# Patient Record
Sex: FEMALE | Race: WHITE | Employment: OTHER | URBAN - METROPOLITAN AREA
[De-identification: names, ages, dates, MRNs, and addresses within clinical notes are randomized per-mention and may not be internally consistent; named-entity substitution may affect disease eponyms.]

---

## 2020-03-05 PROBLEM — N32.81 OAB (OVERACTIVE BLADDER): Status: ACTIVE | Noted: 2020-03-05

## 2020-03-05 PROBLEM — I10 ESSENTIAL HYPERTENSION: Status: ACTIVE | Noted: 2020-03-05

## 2020-03-05 PROBLEM — E66.09 CLASS 2 OBESITY DUE TO EXCESS CALORIES WITHOUT SERIOUS COMORBIDITY WITH BODY MASS INDEX (BMI) OF 39.0 TO 39.9 IN ADULT: Status: ACTIVE | Noted: 2020-03-05

## 2020-03-12 PROBLEM — M47.812 SPONDYLOSIS OF CERVICAL REGION WITHOUT MYELOPATHY OR RADICULOPATHY: Status: ACTIVE | Noted: 2020-03-12

## 2020-03-12 PROBLEM — H25.9 AGE-RELATED CATARACT OF BOTH EYES, UNSPECIFIED AGE-RELATED CATARACT TYPE: Status: ACTIVE | Noted: 2020-03-12

## 2020-03-12 PROBLEM — H35.52 RETINITIS PIGMENTOSA: Status: ACTIVE | Noted: 2020-03-12

## 2020-03-12 PROBLEM — K59.09 CHRONIC CONSTIPATION: Status: ACTIVE | Noted: 2020-03-12

## 2020-06-29 PROCEDURE — 88305 TISSUE EXAM BY PATHOLOGIST: CPT | Performed by: INTERNAL MEDICINE

## 2020-06-29 PROCEDURE — 88341 IMHCHEM/IMCYTCHM EA ADD ANTB: CPT | Performed by: INTERNAL MEDICINE

## 2020-06-29 PROCEDURE — 88342 IMHCHEM/IMCYTCHM 1ST ANTB: CPT | Performed by: INTERNAL MEDICINE

## 2020-07-07 PROBLEM — C20 RECTAL CANCER (HCC): Status: ACTIVE | Noted: 2020-07-07

## 2020-07-10 ENCOUNTER — LAB ENCOUNTER (OUTPATIENT)
Dept: LAB | Facility: HOSPITAL | Age: 75
End: 2020-07-10
Attending: INTERNAL MEDICINE
Payer: MEDICARE

## 2020-07-10 DIAGNOSIS — C20 RECTAL CANCER (HCC): ICD-10-CM

## 2020-07-11 LAB — SARS-COV-2 RNA RESP QL NAA+PROBE: NOT DETECTED

## 2020-07-13 ENCOUNTER — HOSPITAL ENCOUNTER (OUTPATIENT)
Facility: HOSPITAL | Age: 75
Setting detail: HOSPITAL OUTPATIENT SURGERY
Discharge: HOME | End: 2020-07-13
Attending: INTERNAL MEDICINE | Admitting: INTERNAL MEDICINE
Payer: MEDICARE

## 2020-07-13 ENCOUNTER — ANESTHESIA (OUTPATIENT)
Dept: ENDOSCOPY | Facility: HOSPITAL | Age: 75
End: 2020-07-13
Payer: MEDICARE

## 2020-07-13 ENCOUNTER — ANESTHESIA EVENT (OUTPATIENT)
Dept: ENDOSCOPY | Facility: HOSPITAL | Age: 75
End: 2020-07-13
Payer: MEDICARE

## 2020-07-13 VITALS
WEIGHT: 180 LBS | BODY MASS INDEX: 40.49 KG/M2 | DIASTOLIC BLOOD PRESSURE: 71 MMHG | HEART RATE: 70 BPM | RESPIRATION RATE: 16 BRPM | HEIGHT: 56 IN | TEMPERATURE: 98 F | OXYGEN SATURATION: 95 % | SYSTOLIC BLOOD PRESSURE: 137 MMHG

## 2020-07-13 DIAGNOSIS — C20 RECTAL CANCER (HCC): Primary | ICD-10-CM

## 2020-07-13 PROCEDURE — 0DJD8ZZ INSPECTION OF LOWER INTESTINAL TRACT, VIA NATURAL OR ARTIFICIAL OPENING ENDOSCOPIC: ICD-10-PCS | Performed by: INTERNAL MEDICINE

## 2020-07-13 RX ORDER — NALOXONE HYDROCHLORIDE 0.4 MG/ML
80 INJECTION, SOLUTION INTRAMUSCULAR; INTRAVENOUS; SUBCUTANEOUS AS NEEDED
Status: CANCELLED | OUTPATIENT
Start: 2020-07-13 | End: 2020-07-13

## 2020-07-13 RX ORDER — SODIUM CHLORIDE, SODIUM LACTATE, POTASSIUM CHLORIDE, CALCIUM CHLORIDE 600; 310; 30; 20 MG/100ML; MG/100ML; MG/100ML; MG/100ML
INJECTION, SOLUTION INTRAVENOUS CONTINUOUS
Status: DISCONTINUED | OUTPATIENT
Start: 2020-07-13 | End: 2020-07-13

## 2020-07-13 RX ORDER — ONDANSETRON 2 MG/ML
4 INJECTION INTRAMUSCULAR; INTRAVENOUS AS NEEDED
Status: CANCELLED | OUTPATIENT
Start: 2020-07-13 | End: 2020-07-13

## 2020-07-13 RX ORDER — SODIUM CHLORIDE, SODIUM LACTATE, POTASSIUM CHLORIDE, CALCIUM CHLORIDE 600; 310; 30; 20 MG/100ML; MG/100ML; MG/100ML; MG/100ML
INJECTION, SOLUTION INTRAVENOUS CONTINUOUS
Status: CANCELLED | OUTPATIENT
Start: 2020-07-13

## 2020-07-13 RX ADMIN — SODIUM CHLORIDE, SODIUM LACTATE, POTASSIUM CHLORIDE, CALCIUM CHLORIDE: 600; 310; 30; 20 INJECTION, SOLUTION INTRAVENOUS at 15:56:00

## 2020-07-13 RX ADMIN — SODIUM CHLORIDE, SODIUM LACTATE, POTASSIUM CHLORIDE, CALCIUM CHLORIDE: 600; 310; 30; 20 INJECTION, SOLUTION INTRAVENOUS at 16:20:00

## 2020-07-13 NOTE — ANESTHESIA POSTPROCEDURE EVALUATION
[place] [address] Patient Status:  Hospital Outpatient Surgery   Age/Gender 76year old female MRN [id number]   Location [facility]. Attending Vitaliy Cleveland MD   Hosp Day # 0 PCP Aria Pendleton DO       Anesthesia Post-op Note

## 2020-07-13 NOTE — ANESTHESIA PREPROCEDURE EVALUATION
PRE-OP EVALUATION    Patient Name: Jamir Gusman    Pre-op Diagnosis: Rectal cancer ([facility] [address] [C20]    Procedure(s):  RECTAL ENDOSCOPIC ULTRASOUND, FLEXIBLE SIGMOIDOSCOPY      Surgeon(s) and Role:     Tal Perkins MD - Primary    Pre-op vitals reviewed. COLONOSCOPY  06/2020     Social History    Tobacco Use      Smoking status: Never Smoker      Smokeless tobacco: Never Used    Alcohol use: Never      Frequency: Never      Drug use: Unknown     Available pre-op labs reviewed.      Lab Results   Component V

## 2020-07-13 NOTE — H&P
[facility] Department of  Gastroenterology  Update Health History :       Vicci Ranks  female   Elda Cleveland MD     [id number]  3/4/1945 Primary Care Physician  Ana Luisa Lux DO        HPI :  Rectal cancer.   Patient is here for locoregional s Oral Tab, Take 1 tablet (5 mg total) by mouth nightly as needed (neck muscle spasms). , Disp: 30 tablet, Rfl: 1        Review of Symptoms:  A comprehensive 10 point review of systems was completed. /82 (BP Location: Left arm)   Pulse 95   Temp 96. 8

## 2020-07-13 NOTE — PROCEDURES
PATIENT NAME: Karen Pressley  MRN: [id number]  DATE OF OPERATION: 7/13/2020  PREOPERATIVE DIAGNOSIS:   1. Rectal cancer. POSTOPERATIVE DIAGNOSES:  1. Rectal cancer, 3-1/2 cm above the anal verge T3N1  PROCEDURE PERFORMED:  1. Flexible sigmoidoscopy  2.   Radia

## 2020-08-19 PROCEDURE — 88305 TISSUE EXAM BY PATHOLOGIST: CPT | Performed by: OBSTETRICS & GYNECOLOGY

## 2020-11-12 PROBLEM — K74.60 CIRRHOSIS (HCC): Status: ACTIVE | Noted: 2020-11-12

## 2020-11-12 PROBLEM — I70.0 AORTO-ILIAC ATHEROSCLEROSIS (HCC): Status: ACTIVE | Noted: 2020-11-12

## 2020-11-12 PROBLEM — I70.8 AORTO-ILIAC ATHEROSCLEROSIS (HCC): Status: ACTIVE | Noted: 2020-11-12

## 2020-11-12 PROBLEM — D69.6 THROMBOCYTOPENIA (HCC): Status: ACTIVE | Noted: 2020-11-12

## 2020-11-12 PROBLEM — D61.818 PANCYTOPENIA (HCC): Status: ACTIVE | Noted: 2020-11-12

## 2020-11-12 PROBLEM — R74.8 ELEVATED LIVER ENZYMES: Status: ACTIVE | Noted: 2020-11-12

## 2020-12-15 ENCOUNTER — LAB ENCOUNTER (OUTPATIENT)
Dept: LAB | Age: 75
End: 2020-12-15
Attending: COLON & RECTAL SURGERY
Payer: MEDICARE

## 2020-12-15 DIAGNOSIS — Z01.818 PREOP TESTING: ICD-10-CM

## 2020-12-15 PROCEDURE — 86901 BLOOD TYPING SEROLOGIC RH(D): CPT

## 2020-12-15 PROCEDURE — 80048 BASIC METABOLIC PNL TOTAL CA: CPT

## 2020-12-15 PROCEDURE — 86900 BLOOD TYPING SEROLOGIC ABO: CPT

## 2020-12-15 PROCEDURE — 86850 RBC ANTIBODY SCREEN: CPT

## 2020-12-15 PROCEDURE — 36415 COLL VENOUS BLD VENIPUNCTURE: CPT

## 2020-12-15 PROCEDURE — 82378 CARCINOEMBRYONIC ANTIGEN: CPT

## 2020-12-15 PROCEDURE — 85025 COMPLETE CBC W/AUTO DIFF WBC: CPT

## 2020-12-16 ENCOUNTER — OFFICE VISIT (OUTPATIENT)
Dept: WOUND CARE | Facility: HOSPITAL | Age: 75
End: 2020-12-16
Attending: CLINICAL NURSE SPECIALIST
Payer: MEDICARE

## 2020-12-16 DIAGNOSIS — Z71.89 OSTOMY NURSE CONSULTATION: Primary | ICD-10-CM

## 2020-12-16 DIAGNOSIS — C20 RECTAL CANCER (HCC): ICD-10-CM

## 2020-12-16 PROCEDURE — 99213 OFFICE O/P EST LOW 20 MIN: CPT

## 2020-12-16 NOTE — PROGRESS NOTES
Subjective    Chief Complaint  This information was obtained from the patient  patient here for ostomy marking for loop ileostomy. Patient has is here with her sister.  Sole Goodpasture    Allergies  Novocain (Reaction: anxiety,dizziness, palpitations), tetracycline ( biopsy (1990)    Review of Systems (ROS)  This information was obtained from the patient    Complaints and Symptoms  Patient complains of:  Eyes: Vision Changes (loss of peripheral vision)  Gastrointestinal (GI): Other (HX rectal cancer)  Genitourinary ( lesions, no erythema, no open wounds. no induration. Psychiatric:  Oriented to time, place and person. Appropriate mood and affect.         Assessment    Active Problems    ICD-10  (Encounter Diagnosis) C20 - Malignant neoplasm of rectum    Diagnoses

## 2020-12-18 ENCOUNTER — ANESTHESIA (OUTPATIENT)
Dept: SURGERY | Facility: HOSPITAL | Age: 75
DRG: 330 | End: 2020-12-18
Payer: MEDICARE

## 2020-12-18 ENCOUNTER — HOSPITAL ENCOUNTER (INPATIENT)
Facility: HOSPITAL | Age: 75
LOS: 20 days | Discharge: HOME HEALTH | DRG: 330 | End: 2021-01-07
Attending: COLON & RECTAL SURGERY | Admitting: HOSPITALIST
Payer: MEDICARE

## 2020-12-18 ENCOUNTER — ANESTHESIA EVENT (OUTPATIENT)
Dept: SURGERY | Facility: HOSPITAL | Age: 75
DRG: 330 | End: 2020-12-18
Payer: MEDICARE

## 2020-12-18 DIAGNOSIS — Z01.818 PREOP TESTING: Primary | ICD-10-CM

## 2020-12-18 DIAGNOSIS — C20 RECTAL CANCER (HCC): ICD-10-CM

## 2020-12-18 LAB — GLUCOSE BLDC GLUCOMTR-MCNC: 187 MG/DL (ref 70–99)

## 2020-12-18 PROCEDURE — P9045 ALBUMIN (HUMAN), 5%, 250 ML: HCPCS

## 2020-12-18 PROCEDURE — 82962 GLUCOSE BLOOD TEST: CPT

## 2020-12-18 PROCEDURE — 0DJD8ZZ INSPECTION OF LOWER INTESTINAL TRACT, VIA NATURAL OR ARTIFICIAL OPENING ENDOSCOPIC: ICD-10-PCS | Performed by: COLON & RECTAL SURGERY

## 2020-12-18 PROCEDURE — 3E0336Z INTRODUCTION OF NUTRITIONAL SUBSTANCE INTO PERIPHERAL VEIN, PERCUTANEOUS APPROACH: ICD-10-PCS | Performed by: COLON & RECTAL SURGERY

## 2020-12-18 PROCEDURE — S0030 INJECTION, METRONIDAZOLE: HCPCS

## 2020-12-18 PROCEDURE — 0D1B0Z4 BYPASS ILEUM TO CUTANEOUS, OPEN APPROACH: ICD-10-PCS | Performed by: COLON & RECTAL SURGERY

## 2020-12-18 PROCEDURE — 88309 TISSUE EXAM BY PATHOLOGIST: CPT | Performed by: COLON & RECTAL SURGERY

## 2020-12-18 PROCEDURE — 0DTP0ZZ RESECTION OF RECTUM, OPEN APPROACH: ICD-10-PCS | Performed by: COLON & RECTAL SURGERY

## 2020-12-18 PROCEDURE — S0030 INJECTION, METRONIDAZOLE: HCPCS | Performed by: COLON & RECTAL SURGERY

## 2020-12-18 RX ORDER — HYDROMORPHONE HYDROCHLORIDE 1 MG/ML
0.8 INJECTION, SOLUTION INTRAMUSCULAR; INTRAVENOUS; SUBCUTANEOUS EVERY 2 HOUR PRN
Status: DISCONTINUED | OUTPATIENT
Start: 2020-12-18 | End: 2021-01-07

## 2020-12-18 RX ORDER — GABAPENTIN 100 MG/1
200 CAPSULE ORAL NIGHTLY
Status: DISCONTINUED | OUTPATIENT
Start: 2020-12-18 | End: 2020-12-24

## 2020-12-18 RX ORDER — CEFAZOLIN SODIUM/WATER 2 G/20 ML
2 SYRINGE (ML) INTRAVENOUS EVERY 8 HOURS
Status: COMPLETED | OUTPATIENT
Start: 2020-12-18 | End: 2020-12-19

## 2020-12-18 RX ORDER — OXYCODONE HYDROCHLORIDE 5 MG/1
5 TABLET ORAL EVERY 4 HOURS PRN
Status: DISCONTINUED | OUTPATIENT
Start: 2020-12-18 | End: 2020-12-22

## 2020-12-18 RX ORDER — OXYCODONE HYDROCHLORIDE 5 MG/1
10 TABLET ORAL EVERY 4 HOURS PRN
Status: DISCONTINUED | OUTPATIENT
Start: 2020-12-18 | End: 2020-12-22

## 2020-12-18 RX ORDER — ONDANSETRON 2 MG/ML
4 INJECTION INTRAMUSCULAR; INTRAVENOUS ONCE AS NEEDED
Status: COMPLETED | OUTPATIENT
Start: 2020-12-18 | End: 2020-12-18

## 2020-12-18 RX ORDER — SODIUM CHLORIDE 0.9 % (FLUSH) 0.9 %
10 SYRINGE (ML) INJECTION AS NEEDED
Status: DISCONTINUED | OUTPATIENT
Start: 2020-12-18 | End: 2021-01-07

## 2020-12-18 RX ORDER — METOCLOPRAMIDE 10 MG/1
10 TABLET ORAL ONCE
Status: COMPLETED | OUTPATIENT
Start: 2020-12-18 | End: 2020-12-18

## 2020-12-18 RX ORDER — SODIUM CHLORIDE 9 MG/ML
INJECTION, SOLUTION INTRAVENOUS CONTINUOUS PRN
Status: DISCONTINUED | OUTPATIENT
Start: 2020-12-18 | End: 2020-12-18 | Stop reason: SURG

## 2020-12-18 RX ORDER — ACETAMINOPHEN 500 MG
1000 TABLET ORAL EVERY 8 HOURS
Status: DISCONTINUED | OUTPATIENT
Start: 2020-12-18 | End: 2020-12-24

## 2020-12-18 RX ORDER — HEPARIN SODIUM 5000 [USP'U]/ML
5000 INJECTION, SOLUTION INTRAVENOUS; SUBCUTANEOUS ONCE
Status: COMPLETED | OUTPATIENT
Start: 2020-12-18 | End: 2020-12-18

## 2020-12-18 RX ORDER — HALOPERIDOL 5 MG/ML
0.25 INJECTION INTRAMUSCULAR ONCE AS NEEDED
Status: DISCONTINUED | OUTPATIENT
Start: 2020-12-18 | End: 2020-12-18 | Stop reason: HOSPADM

## 2020-12-18 RX ORDER — CELECOXIB 200 MG/1
200 CAPSULE ORAL ONCE
Status: COMPLETED | OUTPATIENT
Start: 2020-12-18 | End: 2020-12-18

## 2020-12-18 RX ORDER — DOCUSATE SODIUM 100 MG/1
100 CAPSULE, LIQUID FILLED ORAL 2 TIMES DAILY
Status: DISCONTINUED | OUTPATIENT
Start: 2020-12-18 | End: 2020-12-21

## 2020-12-18 RX ORDER — HYDROMORPHONE HYDROCHLORIDE 1 MG/ML
0.2 INJECTION, SOLUTION INTRAMUSCULAR; INTRAVENOUS; SUBCUTANEOUS EVERY 2 HOUR PRN
Status: DISCONTINUED | OUTPATIENT
Start: 2020-12-18 | End: 2021-01-07

## 2020-12-18 RX ORDER — ONDANSETRON 2 MG/ML
INJECTION INTRAMUSCULAR; INTRAVENOUS AS NEEDED
Status: DISCONTINUED | OUTPATIENT
Start: 2020-12-18 | End: 2020-12-18 | Stop reason: SURG

## 2020-12-18 RX ORDER — SODIUM CHLORIDE, SODIUM LACTATE, POTASSIUM CHLORIDE, CALCIUM CHLORIDE 600; 310; 30; 20 MG/100ML; MG/100ML; MG/100ML; MG/100ML
INJECTION, SOLUTION INTRAVENOUS CONTINUOUS
Status: DISCONTINUED | OUTPATIENT
Start: 2020-12-18 | End: 2020-12-18 | Stop reason: HOSPADM

## 2020-12-18 RX ORDER — CEFAZOLIN SODIUM/WATER 2 G/20 ML
2 SYRINGE (ML) INTRAVENOUS ONCE
Status: COMPLETED | OUTPATIENT
Start: 2020-12-18 | End: 2020-12-18

## 2020-12-18 RX ORDER — HYDROCODONE BITARTRATE AND ACETAMINOPHEN 5; 325 MG/1; MG/1
2 TABLET ORAL AS NEEDED
Status: DISCONTINUED | OUTPATIENT
Start: 2020-12-18 | End: 2020-12-18 | Stop reason: HOSPADM

## 2020-12-18 RX ORDER — HYDROMORPHONE HYDROCHLORIDE 1 MG/ML
0.4 INJECTION, SOLUTION INTRAMUSCULAR; INTRAVENOUS; SUBCUTANEOUS EVERY 5 MIN PRN
Status: DISCONTINUED | OUTPATIENT
Start: 2020-12-18 | End: 2020-12-18 | Stop reason: HOSPADM

## 2020-12-18 RX ORDER — ROCURONIUM BROMIDE 10 MG/ML
INJECTION, SOLUTION INTRAVENOUS AS NEEDED
Status: DISCONTINUED | OUTPATIENT
Start: 2020-12-18 | End: 2020-12-18 | Stop reason: SURG

## 2020-12-18 RX ORDER — HYDROMORPHONE HYDROCHLORIDE 1 MG/ML
0.4 INJECTION, SOLUTION INTRAMUSCULAR; INTRAVENOUS; SUBCUTANEOUS EVERY 2 HOUR PRN
Status: DISCONTINUED | OUTPATIENT
Start: 2020-12-18 | End: 2021-01-07

## 2020-12-18 RX ORDER — LIDOCAINE HYDROCHLORIDE 10 MG/ML
INJECTION, SOLUTION EPIDURAL; INFILTRATION; INTRACAUDAL; PERINEURAL AS NEEDED
Status: DISCONTINUED | OUTPATIENT
Start: 2020-12-18 | End: 2020-12-18

## 2020-12-18 RX ORDER — METRONIDAZOLE 500 MG/100ML
500 INJECTION, SOLUTION INTRAVENOUS EVERY 8 HOURS
Status: COMPLETED | OUTPATIENT
Start: 2020-12-18 | End: 2020-12-19

## 2020-12-18 RX ORDER — CELECOXIB 200 MG/1
200 CAPSULE ORAL EVERY 12 HOURS SCHEDULED
Status: DISCONTINUED | OUTPATIENT
Start: 2020-12-18 | End: 2020-12-24

## 2020-12-18 RX ORDER — SODIUM CHLORIDE, SODIUM LACTATE, POTASSIUM CHLORIDE, CALCIUM CHLORIDE 600; 310; 30; 20 MG/100ML; MG/100ML; MG/100ML; MG/100ML
2 INJECTION, SOLUTION INTRAVENOUS CONTINUOUS
Status: DISCONTINUED | OUTPATIENT
Start: 2020-12-18 | End: 2020-12-21

## 2020-12-18 RX ORDER — GLYCOPYRROLATE 0.2 MG/ML
INJECTION, SOLUTION INTRAMUSCULAR; INTRAVENOUS AS NEEDED
Status: DISCONTINUED | OUTPATIENT
Start: 2020-12-18 | End: 2020-12-18 | Stop reason: SURG

## 2020-12-18 RX ORDER — POLYETHYLENE GLYCOL 3350 17 G/17G
17 POWDER, FOR SOLUTION ORAL DAILY PRN
Status: DISCONTINUED | OUTPATIENT
Start: 2020-12-18 | End: 2021-01-07

## 2020-12-18 RX ORDER — OXYCODONE HYDROCHLORIDE 5 MG/1
15 TABLET ORAL EVERY 4 HOURS PRN
Status: DISCONTINUED | OUTPATIENT
Start: 2020-12-18 | End: 2020-12-22

## 2020-12-18 RX ORDER — SODIUM CHLORIDE, SODIUM LACTATE, POTASSIUM CHLORIDE, CALCIUM CHLORIDE 600; 310; 30; 20 MG/100ML; MG/100ML; MG/100ML; MG/100ML
INJECTION, SOLUTION INTRAVENOUS CONTINUOUS
Status: DISCONTINUED | OUTPATIENT
Start: 2020-12-18 | End: 2020-12-18

## 2020-12-18 RX ORDER — PROCHLORPERAZINE EDISYLATE 5 MG/ML
5 INJECTION INTRAMUSCULAR; INTRAVENOUS ONCE AS NEEDED
Status: DISCONTINUED | OUTPATIENT
Start: 2020-12-18 | End: 2020-12-18 | Stop reason: HOSPADM

## 2020-12-18 RX ORDER — FAMOTIDINE 20 MG/1
20 TABLET ORAL ONCE
Status: COMPLETED | OUTPATIENT
Start: 2020-12-18 | End: 2020-12-18

## 2020-12-18 RX ORDER — MAGNESIUM OXIDE 400 MG (241.3 MG MAGNESIUM) TABLET
400 TABLET DAILY
Status: DISCONTINUED | OUTPATIENT
Start: 2020-12-18 | End: 2020-12-21

## 2020-12-18 RX ORDER — HYDROMORPHONE HYDROCHLORIDE 1 MG/ML
0.2 INJECTION, SOLUTION INTRAMUSCULAR; INTRAVENOUS; SUBCUTANEOUS EVERY 5 MIN PRN
Status: DISCONTINUED | OUTPATIENT
Start: 2020-12-18 | End: 2020-12-18 | Stop reason: HOSPADM

## 2020-12-18 RX ORDER — ONDANSETRON 2 MG/ML
4 INJECTION INTRAMUSCULAR; INTRAVENOUS EVERY 4 HOURS PRN
Status: DISCONTINUED | OUTPATIENT
Start: 2020-12-18 | End: 2021-01-07

## 2020-12-18 RX ORDER — INDOCYANINE GREEN AND WATER 25 MG
KIT INJECTION AS NEEDED
Status: DISCONTINUED | OUTPATIENT
Start: 2020-12-18 | End: 2020-12-18 | Stop reason: SURG

## 2020-12-18 RX ORDER — HYDROMORPHONE HYDROCHLORIDE 1 MG/ML
0.6 INJECTION, SOLUTION INTRAMUSCULAR; INTRAVENOUS; SUBCUTANEOUS EVERY 5 MIN PRN
Status: DISCONTINUED | OUTPATIENT
Start: 2020-12-18 | End: 2020-12-18 | Stop reason: HOSPADM

## 2020-12-18 RX ORDER — HEPARIN SODIUM 5000 [USP'U]/ML
5000 INJECTION, SOLUTION INTRAVENOUS; SUBCUTANEOUS EVERY 8 HOURS SCHEDULED
Status: DISCONTINUED | OUTPATIENT
Start: 2020-12-19 | End: 2021-01-07

## 2020-12-18 RX ORDER — MORPHINE SULFATE 10 MG/ML
6 INJECTION, SOLUTION INTRAMUSCULAR; INTRAVENOUS EVERY 10 MIN PRN
Status: DISCONTINUED | OUTPATIENT
Start: 2020-12-18 | End: 2020-12-18 | Stop reason: HOSPADM

## 2020-12-18 RX ORDER — HYDROCODONE BITARTRATE AND ACETAMINOPHEN 5; 325 MG/1; MG/1
1 TABLET ORAL AS NEEDED
Status: DISCONTINUED | OUTPATIENT
Start: 2020-12-18 | End: 2020-12-18 | Stop reason: HOSPADM

## 2020-12-18 RX ORDER — EPHEDRINE SULFATE 50 MG/ML
INJECTION, SOLUTION INTRAVENOUS AS NEEDED
Status: DISCONTINUED | OUTPATIENT
Start: 2020-12-18 | End: 2020-12-18 | Stop reason: SURG

## 2020-12-18 RX ORDER — METRONIDAZOLE 500 MG/100ML
500 INJECTION, SOLUTION INTRAVENOUS ONCE
Status: COMPLETED | OUTPATIENT
Start: 2020-12-18 | End: 2020-12-18

## 2020-12-18 RX ORDER — LABETALOL HYDROCHLORIDE 5 MG/ML
INJECTION, SOLUTION INTRAVENOUS AS NEEDED
Status: DISCONTINUED | OUTPATIENT
Start: 2020-12-18 | End: 2020-12-18 | Stop reason: SURG

## 2020-12-18 RX ORDER — MORPHINE SULFATE 4 MG/ML
4 INJECTION, SOLUTION INTRAMUSCULAR; INTRAVENOUS EVERY 10 MIN PRN
Status: DISCONTINUED | OUTPATIENT
Start: 2020-12-18 | End: 2020-12-18 | Stop reason: HOSPADM

## 2020-12-18 RX ORDER — MORPHINE SULFATE 4 MG/ML
2 INJECTION, SOLUTION INTRAMUSCULAR; INTRAVENOUS EVERY 10 MIN PRN
Status: DISCONTINUED | OUTPATIENT
Start: 2020-12-18 | End: 2020-12-18 | Stop reason: HOSPADM

## 2020-12-18 RX ORDER — ACETAMINOPHEN 500 MG
1000 TABLET ORAL ONCE
Status: COMPLETED | OUTPATIENT
Start: 2020-12-18 | End: 2020-12-18

## 2020-12-18 RX ORDER — DEXAMETHASONE SODIUM PHOSPHATE 4 MG/ML
VIAL (ML) INJECTION AS NEEDED
Status: DISCONTINUED | OUTPATIENT
Start: 2020-12-18 | End: 2020-12-18 | Stop reason: SURG

## 2020-12-18 RX ORDER — NEOSTIGMINE METHYLSULFATE 1 MG/ML
INJECTION INTRAVENOUS AS NEEDED
Status: DISCONTINUED | OUTPATIENT
Start: 2020-12-18 | End: 2020-12-18 | Stop reason: SURG

## 2020-12-18 RX ORDER — NALOXONE HYDROCHLORIDE 0.4 MG/ML
80 INJECTION, SOLUTION INTRAMUSCULAR; INTRAVENOUS; SUBCUTANEOUS AS NEEDED
Status: DISCONTINUED | OUTPATIENT
Start: 2020-12-18 | End: 2020-12-18 | Stop reason: HOSPADM

## 2020-12-18 RX ADMIN — DEXAMETHASONE SODIUM PHOSPHATE 4 MG: 4 MG/ML VIAL (ML) INJECTION at 08:47:00

## 2020-12-18 RX ADMIN — SODIUM CHLORIDE: 9 INJECTION, SOLUTION INTRAVENOUS at 14:44:00

## 2020-12-18 RX ADMIN — NEOSTIGMINE METHYLSULFATE 4 MG: 1 INJECTION INTRAVENOUS at 15:56:00

## 2020-12-18 RX ADMIN — LABETALOL HYDROCHLORIDE 5 MG: 5 INJECTION, SOLUTION INTRAVENOUS at 12:01:00

## 2020-12-18 RX ADMIN — ROCURONIUM BROMIDE 30 MG: 10 INJECTION, SOLUTION INTRAVENOUS at 08:00:00

## 2020-12-18 RX ADMIN — CEFAZOLIN SODIUM/WATER 2 G: 2 G/20 ML SYRINGE (ML) INTRAVENOUS at 08:00:00

## 2020-12-18 RX ADMIN — ROCURONIUM BROMIDE 10 MG: 10 INJECTION, SOLUTION INTRAVENOUS at 14:15:00

## 2020-12-18 RX ADMIN — EPHEDRINE SULFATE 5 MG: 50 INJECTION, SOLUTION INTRAVENOUS at 08:28:00

## 2020-12-18 RX ADMIN — EPHEDRINE SULFATE 5 MG: 50 INJECTION, SOLUTION INTRAVENOUS at 08:05:00

## 2020-12-18 RX ADMIN — CEFAZOLIN SODIUM/WATER 2 G: 2 G/20 ML SYRINGE (ML) INTRAVENOUS at 12:05:00

## 2020-12-18 RX ADMIN — SODIUM CHLORIDE: 9 INJECTION, SOLUTION INTRAVENOUS at 09:56:00

## 2020-12-18 RX ADMIN — SODIUM CHLORIDE: 9 INJECTION, SOLUTION INTRAVENOUS at 12:01:00

## 2020-12-18 RX ADMIN — ROCURONIUM BROMIDE 10 MG: 10 INJECTION, SOLUTION INTRAVENOUS at 13:30:00

## 2020-12-18 RX ADMIN — METRONIDAZOLE 500 MG: 500 INJECTION, SOLUTION INTRAVENOUS at 14:20:00

## 2020-12-18 RX ADMIN — ROCURONIUM BROMIDE 20 MG: 10 INJECTION, SOLUTION INTRAVENOUS at 08:55:00

## 2020-12-18 RX ADMIN — ROCURONIUM BROMIDE 10 MG: 10 INJECTION, SOLUTION INTRAVENOUS at 10:29:00

## 2020-12-18 RX ADMIN — SODIUM CHLORIDE, SODIUM LACTATE, POTASSIUM CHLORIDE, CALCIUM CHLORIDE: 600; 310; 30; 20 INJECTION, SOLUTION INTRAVENOUS at 14:42:00

## 2020-12-18 RX ADMIN — ROCURONIUM BROMIDE 10 MG: 10 INJECTION, SOLUTION INTRAVENOUS at 10:53:00

## 2020-12-18 RX ADMIN — ROCURONIUM BROMIDE 10 MG: 10 INJECTION, SOLUTION INTRAVENOUS at 11:29:00

## 2020-12-18 RX ADMIN — SODIUM CHLORIDE: 9 INJECTION, SOLUTION INTRAVENOUS at 08:00:00

## 2020-12-18 RX ADMIN — SODIUM CHLORIDE, SODIUM LACTATE, POTASSIUM CHLORIDE, CALCIUM CHLORIDE: 600; 310; 30; 20 INJECTION, SOLUTION INTRAVENOUS at 14:43:00

## 2020-12-18 RX ADMIN — LABETALOL HYDROCHLORIDE 5 MG: 5 INJECTION, SOLUTION INTRAVENOUS at 09:16:00

## 2020-12-18 RX ADMIN — ONDANSETRON 4 MG: 2 INJECTION INTRAMUSCULAR; INTRAVENOUS at 08:47:00

## 2020-12-18 RX ADMIN — SODIUM CHLORIDE: 9 INJECTION, SOLUTION INTRAVENOUS at 12:00:00

## 2020-12-18 RX ADMIN — GLYCOPYRROLATE 0.8 MG: 0.2 INJECTION, SOLUTION INTRAMUSCULAR; INTRAVENOUS at 15:56:00

## 2020-12-18 RX ADMIN — LABETALOL HYDROCHLORIDE 5 MG: 5 INJECTION, SOLUTION INTRAVENOUS at 09:36:00

## 2020-12-18 RX ADMIN — METRONIDAZOLE 500 MG: 500 INJECTION, SOLUTION INTRAVENOUS at 08:00:00

## 2020-12-18 RX ADMIN — ROCURONIUM BROMIDE 10 MG: 10 INJECTION, SOLUTION INTRAVENOUS at 15:17:00

## 2020-12-18 RX ADMIN — SODIUM CHLORIDE: 9 INJECTION, SOLUTION INTRAVENOUS at 15:14:00

## 2020-12-18 RX ADMIN — EPHEDRINE SULFATE 5 MG: 50 INJECTION, SOLUTION INTRAVENOUS at 08:20:00

## 2020-12-18 RX ADMIN — ROCURONIUM BROMIDE 10 MG: 10 INJECTION, SOLUTION INTRAVENOUS at 14:52:00

## 2020-12-18 RX ADMIN — LABETALOL HYDROCHLORIDE 5 MG: 5 INJECTION, SOLUTION INTRAVENOUS at 09:00:00

## 2020-12-18 RX ADMIN — INDOCYANINE GREEN AND WATER 7.5 MG: 25 MG KIT INJECTION at 14:25:00

## 2020-12-18 NOTE — ANESTHESIA PROCEDURE NOTES
Airway  Urgency: Elective      General Information and Staff    Patient location during procedure: OR  Anesthesiologist: Raquel Gonzales MD  Resident/CRNA: Jennifer Nascimento CRNA  Performed: CRNA     Indications and Patient Condition  Indications for airw

## 2020-12-18 NOTE — H&P
[facility] Hospitalist H&P       CC: No chief complaint on file. PCP: Tara Moore DO    Date of Admission: 12/18/2020  5:39 AM    ASSESSMENT / PLAN:     Ms. Opal Levine is a 77 yo F with PMH of rectal CA, anxiety, HTN, who presented for LAR.     Rectal CA S/p Performed by Marsha Saucedo MD at [facility] ENDOSCOPY   • OTHER  11/2020    exploration of rectal tumor   • OTHER      cyst in vagina - benign        ALL:    Novocain                ANXIETY, DIZZINESS, PALPITATIONS  Tetracycline            NAUSEA ONLY  Adhesive wheezes  CV: RRR, no murmurs  ABD: Soft, non-tender, non-distended, +BS  Neuro: Grossly normal, CN intact, sensory intact  Psych: Affect- normal  SKIN: warm, dry  EXT: no edema    Diagnostic Data:    CBC/Chem    Recent Labs   Lab 12/15/20  1209   RBC 4.32

## 2020-12-18 NOTE — ANESTHESIA POSTPROCEDURE EVALUATION
Patient: Nayeli Martin    Procedure Summary     Date: 12/18/20 Room / Location: [address] [place] [address] MAIN OR 05 / [address] [place] [address] MAIN OR    Anesthesia Start: 7993 Anesthesia Stop: 1371    Procedures:       TRANSANAL TOTAL MESORECTAL EXCISION (N/A Abdomen)      LAPAROSCOPIC COLON RESEC

## 2020-12-18 NOTE — BRIEF OP NOTE
Pre-Operative Diagnosis: Rectal cancer ([facility] [address] [C20]     Post-Operative Diagnosis: Rectal cancer ([facility] [C20]      Procedure Performed:   Procedure(s):  PROCTOSCOPY, LAPAROSCOPIC LOW ANTERIOR RESECTION WITH COLORECTAL ANASTOMOSIS, TRANSANAL TOTAL MESORECTAL E

## 2020-12-18 NOTE — ANESTHESIA PROCEDURE NOTES
Peripheral IV  Date/Time: 12/18/2020 7:58 AM  Inserted by: Valdemar Montes MD    Placement  Laterality: left  Location: hand  Local anesthetic: none  Site prep: alcohol  Technique: anatomical landmarks

## 2020-12-18 NOTE — ANESTHESIA PROCEDURE NOTES
Arterial Line  Performed by: Kaleb Lucero CRNA  Authorized by: Germán Jennings MD     General Information and Staff    Procedure Start:  12/18/2020 7:39 AM  Procedure End:  12/18/2020 8:57 AM  Anesthesiologist:  Germán Jennings MD  Patient Locat

## 2020-12-18 NOTE — ANESTHESIA PREPROCEDURE EVALUATION
Anesthesia PreOp Note    HPI:     Carmenza Musa is a 76year old female who presents for preoperative consultation requested by: Jennifer Aquino MD    Date of Surgery: 12/18/2020    Procedure(s):  TRANSANAL TOTAL MESORECTAL EXCISION  LAPAROSCOPIC COLON RESECTI (overactive bladder)    • Osteoarthritis     lower back, neck   • Personal history of antineoplastic chemotherapy     last chemo 10/20   • Retinitis pigmentosa        Past Surgical History:   Procedure Laterality Date   • BREAST BIOPSY  late 1990's    satish Hypertension Mother    • Arthritis Mother         OA   • Heart Disease Father         CHF   • Diabetes Father    • Lung Disorder Father         COPD   • Thyroid disease Sister    • Other (Other) Brother         MVA   • Heart Attack Brother    • Arthritis B 11/04/2020    RBC 4.32 12/15/2020    RBC 3.79 (L) 11/04/2020    HGB 14.3 12/15/2020    HGB 12.4 11/04/2020    HCT 43.2 12/15/2020    HCT 38.0 11/04/2020    .0 12/15/2020    .3 (H) 11/04/2020    MCH 33.1 12/15/2020    MCH 32.7 11/04/2020    MC complications, and any alternative forms of anesthetic management. All of the patient's questions were answered to the best of my ability. The patient desires the anesthetic management as planned.   Jelani Bridges  12/18/2020 6:44 AM

## 2020-12-18 NOTE — OPERATIVE REPORT
Operative Note    Patient Name: Zaida Haskins    Date of Surgery: 12/18/20     Preoperative Diagnosis: Rectal cancer ([address] [C20]    Postoperative Diagnosis: same    Primary Surgeon: Stephanie Avalos    CoSurgeon:  Ezequiel Martinez MD    Assistant: Freda Sherman A 0-Vicryl pursestring suture was placed at this location to allow for maintenance of pneumoperitoneum and later for closure of the fascia at the end of the operation. Additional cannulas were placed.  Three 5 mm cannulas were placed with 1 in the left lowe were divided down to the midlevel of the rectum. The mesorectum was elevated off of the presacral space by bluntly dissecting in the plane between the fascia propria of the rectum and Waldeyer fascia.        DESCRIPTION OF TRANSANAL PROCEDURE    Proctoscopy that the mesentery and the colon were divided at the bifurcation of the left colic into ascending and descending branches. Once the mesentery was divided, ICG fluorescence angiography was performed using [employer] system.  Excellent perfusion was noted at the pr in the cephalad aspect of the aperture. The functional limb everted nicely and had the appearance of an end ileostomy above skin level. An ileostomy appliance was then placed over the ileostomy.      The patient was then allowed to emerge from anesthesia w

## 2020-12-19 LAB
ANION GAP SERPL CALC-SCNC: 7 MMOL/L (ref 0–18)
BASOPHILS # BLD AUTO: 0.01 X10(3) UL (ref 0–0.2)
BASOPHILS NFR BLD AUTO: 0.1 %
BUN BLD-MCNC: 9 MG/DL (ref 7–18)
BUN/CREAT SERPL: 10.7 (ref 10–20)
CALCIUM BLD-MCNC: 7.8 MG/DL (ref 8.5–10.1)
CHLORIDE SERPL-SCNC: 105 MMOL/L (ref 98–112)
CO2 SERPL-SCNC: 27 MMOL/L (ref 21–32)
CREAT BLD-MCNC: 0.84 MG/DL
DEPRECATED RDW RBC AUTO: 49.1 FL (ref 35.1–46.3)
EOSINOPHIL # BLD AUTO: 0 X10(3) UL (ref 0–0.7)
EOSINOPHIL NFR BLD AUTO: 0 %
ERYTHROCYTE [DISTWIDTH] IN BLOOD BY AUTOMATED COUNT: 13.4 % (ref 11–15)
GLUCOSE BLD-MCNC: 137 MG/DL (ref 70–99)
GLUCOSE BLDC GLUCOMTR-MCNC: 166 MG/DL (ref 70–99)
HAV IGM SER QL: 1.6 MG/DL (ref 1.6–2.6)
HCT VFR BLD AUTO: 31.5 %
HGB BLD-MCNC: 10.8 G/DL
IMM GRANULOCYTES # BLD AUTO: 0.03 X10(3) UL (ref 0–1)
IMM GRANULOCYTES NFR BLD: 0.4 %
LYMPHOCYTES # BLD AUTO: 0.24 X10(3) UL (ref 1–4)
LYMPHOCYTES NFR BLD AUTO: 3.1 %
MCH RBC QN AUTO: 34.1 PG (ref 26–34)
MCHC RBC AUTO-ENTMCNC: 34.3 G/DL (ref 31–37)
MCV RBC AUTO: 99.4 FL
MONOCYTES # BLD AUTO: 0.54 X10(3) UL (ref 0.1–1)
MONOCYTES NFR BLD AUTO: 7 %
NEUTROPHILS # BLD AUTO: 6.89 X10 (3) UL (ref 1.5–7.7)
NEUTROPHILS # BLD AUTO: 6.89 X10(3) UL (ref 1.5–7.7)
NEUTROPHILS NFR BLD AUTO: 89.4 %
OSMOLALITY SERPL CALC.SUM OF ELEC: 289 MOSM/KG (ref 275–295)
PHOSPHATE SERPL-MCNC: 1.8 MG/DL (ref 2.5–4.9)
PLATELET # BLD AUTO: 133 10(3)UL (ref 150–450)
POTASSIUM SERPL-SCNC: 3.9 MMOL/L (ref 3.5–5.1)
RBC # BLD AUTO: 3.17 X10(6)UL
SODIUM SERPL-SCNC: 139 MMOL/L (ref 136–145)
WBC # BLD AUTO: 7.7 X10(3) UL (ref 4–11)

## 2020-12-19 PROCEDURE — 83735 ASSAY OF MAGNESIUM: CPT | Performed by: COLON & RECTAL SURGERY

## 2020-12-19 PROCEDURE — 97165 OT EVAL LOW COMPLEX 30 MIN: CPT

## 2020-12-19 PROCEDURE — 97161 PT EVAL LOW COMPLEX 20 MIN: CPT

## 2020-12-19 PROCEDURE — S0030 INJECTION, METRONIDAZOLE: HCPCS | Performed by: COLON & RECTAL SURGERY

## 2020-12-19 PROCEDURE — 85025 COMPLETE CBC W/AUTO DIFF WBC: CPT | Performed by: COLON & RECTAL SURGERY

## 2020-12-19 PROCEDURE — 80048 BASIC METABOLIC PNL TOTAL CA: CPT | Performed by: COLON & RECTAL SURGERY

## 2020-12-19 PROCEDURE — 84100 ASSAY OF PHOSPHORUS: CPT | Performed by: COLON & RECTAL SURGERY

## 2020-12-19 PROCEDURE — 97530 THERAPEUTIC ACTIVITIES: CPT

## 2020-12-19 RX ORDER — METOCLOPRAMIDE HYDROCHLORIDE 5 MG/ML
10 INJECTION INTRAMUSCULAR; INTRAVENOUS EVERY 6 HOURS PRN
Status: DISCONTINUED | OUTPATIENT
Start: 2020-12-19 | End: 2020-12-25

## 2020-12-19 RX ORDER — SIMETHICONE 80 MG
80 TABLET,CHEWABLE ORAL 4 TIMES DAILY PRN
Status: DISCONTINUED | OUTPATIENT
Start: 2020-12-19 | End: 2021-01-07

## 2020-12-19 RX ORDER — ACETAMINOPHEN 10 MG/ML
1000 INJECTION, SOLUTION INTRAVENOUS EVERY 6 HOURS
Status: DISCONTINUED | OUTPATIENT
Start: 2020-12-19 | End: 2020-12-21

## 2020-12-19 RX ORDER — CALCIUM CARBONATE 200(500)MG
500 TABLET,CHEWABLE ORAL 2 TIMES DAILY PRN
Status: DISCONTINUED | OUTPATIENT
Start: 2020-12-19 | End: 2021-01-07

## 2020-12-19 RX ORDER — MAGNESIUM OXIDE 400 MG (241.3 MG MAGNESIUM) TABLET
400 TABLET ONCE
Status: DISCONTINUED | OUTPATIENT
Start: 2020-12-19 | End: 2020-12-19

## 2020-12-19 RX ORDER — SIMETHICONE 80 MG
80 TABLET,CHEWABLE ORAL
Status: DISCONTINUED | OUTPATIENT
Start: 2020-12-19 | End: 2020-12-19

## 2020-12-19 RX ORDER — MAGNESIUM SULFATE HEPTAHYDRATE 40 MG/ML
2 INJECTION, SOLUTION INTRAVENOUS ONCE
Status: COMPLETED | OUTPATIENT
Start: 2020-12-19 | End: 2020-12-19

## 2020-12-19 NOTE — PROGRESS NOTES
[facility]    Progress Note    Zelda Moreno Patient Status:  Inpatient    3/4/1945 MRN [id number]   Location [facility] 4W/SW/SE Attending Dwayne Malik MD   Hosp Day # 1 PCP Tara Moore DO        Subjective:    Zelda Moreno is a oxyCODONE HCl (OXY-IR) cap/tab 10 mg, 10 mg, Oral, Q4H PRN    Or    •  oxyCODONE HCl (OXY-IR) cap/tab 15 mg, 15 mg, Oral, Q4H PRN    •  HYDROmorphone HCl (DILAUDID) 1 MG/ML injection 0.2 mg, 0.2 mg, Intravenous, Q2H PRN    Or    •  HYDROmorphone HCl (DILAU No focal neurological deficits. Musculoskeletal: Full range of motion of all extremities. No swelling noted. Integument: No lesions. No erythema. Psychiatric: Appropriate mood and affect.         Assessment and Plan:     Rectal cancer ([facility] [address]  POD#1 s/p l

## 2020-12-19 NOTE — PLAN OF CARE
Admitted to 441 from PACU. Oriented to room and safety precautions. Visually impaired and hard of hearing. Ileostomy intact. PATRICIA with serosanguinous drainage in bulb and drainage at insertion site. Abdominal laps with dermabond, clean/dry/intact. ERAS.  IVF

## 2020-12-19 NOTE — PROGRESS NOTES
[facility] Hospitalist note    PCP: Dana Sood DO    Chief Complaint:  F/u LAR     SUBJECTIVE:  Pt reports some reflux and discomfort. Nausea as well.  No sob    OBJECTIVE:  Temp:  [97.4 °F (36.3 °C)-98.3 °F (36.8 °C)] 98.3 °F (36.8 °C)  Pulse:  [48-72] 65 oxyCODONE HCl **OR** oxyCODONE HCl, HYDROmorphone HCl **OR** HYDROmorphone HCl **OR** HYDROmorphone HCl, PEG 3350, magnesium hydroxide, ondansetron HCl       Assessment/Plan:     Ms. David Velázquez is a 77 yo F with PMH of rectal CA, anxiety, HTN, who presented for

## 2020-12-19 NOTE — PLAN OF CARE
Problem: Patient Centered Care  Goal: Patient preferences are identified and integrated in the patient's plan of care  Description: Interventions:  - What would you like us to know as we care for you?   - Provide timely, complete, and accurate informatio techniques  - Monitor for opioid side effects  - Notify MD/LIP if interventions unsuccessful or patient reports new pain  - Anticipate increased pain with activity and pre-medicate as appropriate  12/19/2020 0217 by Lavon Wong RN  Outcome: Progressing Progressing  Goal: Incision/wound heals without complications  Description: Interventions:  - Assess wound bed/incision and surrounding skin tissue  - Collaborate with physician/APN and implement wound/incision site care and dressing changes as ordered  - needed  - Optimize oral hygiene and moisture  - Encourage food from home; allow for food preferences  - Enhance eating environment  12/19/2020 0217 by Miguelangel Swartz RN  Outcome: Progressing  12/19/2020 0217 by Miguelangel Swartz RN  Outcome: Progressing  Alert

## 2020-12-19 NOTE — PHYSICAL THERAPY NOTE
PHYSICAL THERAPY EVALUATION - INPATIENT     Room Number: 441/441-A  Evaluation Date: 12/19/2020  Type of Evaluation: Initial   Physician Order: PT Eval and Treat    Presenting Problem: rectal cancer s/p colon resection and ileostomy  Reason for Therapy: M training  Rehab Potential : Good  Frequency (Obs): Daily       PHYSICAL THERAPY MEDICAL/SOCIAL HISTORY     History related to current admission: pt with rectal cancer     Problem List  Active Problems:    Rectal cancer [facility])      Past Medical History  Past WFL - within functional limits    RANGE OF MOTION AND STRENGTH ASSESSMENT  Upper extremity ROM and strength are within functional limits    Lower extremity ROM is within functional limits     Lower extremity strength is within functional limit    BALANCE reach;RN aware of session/findings; All patient questions and concerns addressed(chair reclined)    CURRENT GOALS    Goals to be met by: 12/26/20  Patient Goal Patient's self-stated goal is: to go home with her sister   Goal #1 Patient is able to [facility]

## 2020-12-20 ENCOUNTER — APPOINTMENT (OUTPATIENT)
Dept: GENERAL RADIOLOGY | Facility: HOSPITAL | Age: 75
DRG: 330 | End: 2020-12-20
Attending: SURGERY
Payer: MEDICARE

## 2020-12-20 LAB
ANION GAP SERPL CALC-SCNC: 6 MMOL/L (ref 0–18)
BASOPHILS # BLD AUTO: 0.01 X10(3) UL (ref 0–0.2)
BASOPHILS NFR BLD AUTO: 0.1 %
BUN BLD-MCNC: 13 MG/DL (ref 7–18)
BUN/CREAT SERPL: 18.8 (ref 10–20)
CALCIUM BLD-MCNC: 8.2 MG/DL (ref 8.5–10.1)
CHLORIDE SERPL-SCNC: 107 MMOL/L (ref 98–112)
CO2 SERPL-SCNC: 28 MMOL/L (ref 21–32)
CREAT BLD-MCNC: 0.69 MG/DL
DEPRECATED RDW RBC AUTO: 50.7 FL (ref 35.1–46.3)
EOSINOPHIL # BLD AUTO: 0 X10(3) UL (ref 0–0.7)
EOSINOPHIL NFR BLD AUTO: 0 %
ERYTHROCYTE [DISTWIDTH] IN BLOOD BY AUTOMATED COUNT: 13.9 % (ref 11–15)
GLUCOSE BLD-MCNC: 121 MG/DL (ref 70–99)
HAV IGM SER QL: 2.4 MG/DL (ref 1.6–2.6)
HCT VFR BLD AUTO: 31.8 %
HGB BLD-MCNC: 10.6 G/DL
IMM GRANULOCYTES # BLD AUTO: 0.03 X10(3) UL (ref 0–1)
IMM GRANULOCYTES NFR BLD: 0.4 %
LYMPHOCYTES # BLD AUTO: 0.33 X10(3) UL (ref 1–4)
LYMPHOCYTES NFR BLD AUTO: 4.2 %
MCH RBC QN AUTO: 33.3 PG (ref 26–34)
MCHC RBC AUTO-ENTMCNC: 33.3 G/DL (ref 31–37)
MCV RBC AUTO: 100 FL
MONOCYTES # BLD AUTO: 0.94 X10(3) UL (ref 0.1–1)
MONOCYTES NFR BLD AUTO: 11.8 %
NEUTROPHILS # BLD AUTO: 6.63 X10 (3) UL (ref 1.5–7.7)
NEUTROPHILS # BLD AUTO: 6.63 X10(3) UL (ref 1.5–7.7)
NEUTROPHILS NFR BLD AUTO: 83.5 %
OSMOLALITY SERPL CALC.SUM OF ELEC: 293 MOSM/KG (ref 275–295)
PLATELET # BLD AUTO: 128 10(3)UL (ref 150–450)
POTASSIUM SERPL-SCNC: 3.8 MMOL/L (ref 3.5–5.1)
RBC # BLD AUTO: 3.18 X10(6)UL
SODIUM SERPL-SCNC: 141 MMOL/L (ref 136–145)
WBC # BLD AUTO: 7.9 X10(3) UL (ref 4–11)

## 2020-12-20 PROCEDURE — 51701 INSERT BLADDER CATHETER: CPT

## 2020-12-20 PROCEDURE — 83735 ASSAY OF MAGNESIUM: CPT | Performed by: HOSPITALIST

## 2020-12-20 PROCEDURE — 80048 BASIC METABOLIC PNL TOTAL CA: CPT | Performed by: HOSPITALIST

## 2020-12-20 PROCEDURE — 74019 RADEX ABDOMEN 2 VIEWS: CPT | Performed by: SURGERY

## 2020-12-20 PROCEDURE — 85025 COMPLETE CBC W/AUTO DIFF WBC: CPT | Performed by: HOSPITALIST

## 2020-12-20 RX ORDER — POTASSIUM CHLORIDE 14.9 MG/ML
20 INJECTION INTRAVENOUS ONCE
Status: COMPLETED | OUTPATIENT
Start: 2020-12-20 | End: 2020-12-20

## 2020-12-20 NOTE — PLAN OF CARE
Patient continues to have a lot nausea, however no emesis this shift thus far. Declines all oral medications, barely taking sips of water d/t fear of throwing up.  IV reglan added per surgery, alternating between that and zofran but patient hesitant to take additional Care Plan goals for specific interventions  Outcome: Progressing     Problem: RISK FOR INFECTION - ADULT  Goal: Absence of fever/infection during anticipated neutropenic period  Description: INTERVENTIONS  - Monitor WBC  - Administer growth fact INTERVENTIONS:  - Encourage pt to monitor pain and request assistance  - Assess pain using appropriate pain scale  - Administer analgesics based on type and severity of pain and evaluate response  - Implement non-pharmacological measures as appropriate and food from home; allow for food preferences  - Enhance eating environment  Outcome: Not Progressing

## 2020-12-20 NOTE — OCCUPATIONAL THERAPY NOTE
OCCUPATIONAL THERAPY EVALUATION - INPATIENT     Room Number: 441/441-A  Evaluation Date: 12/19/2020  Type of Evaluation: Initial  Presenting Problem: (s/p colon resection & ileostomy due to rectal CA )    Physician Order: IP Consult to Occupational Therapy Recommendations: 24 hour care/supervision;Home(HHOT pending progress )  OT Device Recommendations: TBD    PLAN  OT Treatment Plan: Balance activities; ADL training;Functional transfer training;UE strengthening/ROM; Endurance training;Patient/Family education EXAMINATION      OBJECTIVE  Precautions: Low vision(iliostomy)  Fall Risk: Standard fall risk    PAIN ASSESSMENT  Ratin  Location: (Abdomen )  Management Techniques: Repositioning;Relaxation      COGNITION  Overall Cognitive Status:  WFL - within funct Goals  Patients self stated goal is: Recover from surgery      Patient will complete functional transfer with CGA w/RW. Comment:     Patient will complete toileting with CGA.    Comment:     Patient will tolerate standing for 4 minutes in prep for adls wi

## 2020-12-20 NOTE — PROGRESS NOTES
[facility] Hospitalist note    PCP: Clarise Kayser, DO    Chief Complaint:  F/u LAR     SUBJECTIVE:  Patient denies any chest pain, palpitations, shortness of breath, cough. Patient with nausea and vomiting. Poor appetite as well.  Patient with abdominal pain, n Intravenous Once   • acetaminophen  1,000 mg Intravenous Q6H   • Heparin Sodium (Porcine)  5,000 Units Subcutaneous Q8H [facility]   • acetaminophen  1,000 mg Oral Q8H   • gabapentin  200 mg Oral Nightly   • docusate sodium  100 mg Oral BID   • magnesium oxide  4

## 2020-12-20 NOTE — PLAN OF CARE
Patient alert and oriented x4. Up with standby assist. Full liquid diet, minimal intact. Obstructive series done in AM, surgery team aware of results. Pain controlled with IV tylenol. Unable to void. Patient straight cath'ed x1. Check void by 9pm tonight. distraction and/or relaxation techniques  - Monitor for opioid side effects  - Notify MD/LIP if interventions unsuccessful or patient reports new pain  - Anticipate increased pain with activity and pre-medicate as appropriate  Outcome: Progressing     Prob for ostomies/wounds/G-tubes  Outcome: Progressing     Problem: GASTROINTESTINAL - ADULT  Goal: Minimal or absence of nausea and vomiting  Description: INTERVENTIONS:  - Maintain adequate hydration with IV or PO as ordered and tolerated  - Nasogastric tube

## 2020-12-20 NOTE — PROGRESS NOTES
[facility]    Progress Note    Zelda Moreno Patient Status:  Inpatient    3/4/1945 MRN [id number]   Location [facility] 4W/SW/SE Attending Dwayne Malik MD   Hosp Day # 2 PCP Tara Moore DO        Subjective:    Zelda Moreno is a IVPB premix 500 mg, 500 mg, Intravenous, Once    •  [COMPLETED] ondansetron HCl (ZOFRAN) injection 4 mg, 4 mg, Intravenous, Once PRN    •  lactated ringers infusion, 2 mL/kg/hr, Intravenous, Continuous    •  Normal Saline Flush 0.9 % injection 10 mL, 10 mL [Urine:1050; Drains:120; Stool:960]    General: No acute distress. Alert and oriented x 3. HEENT: Moist mucous membranes. EOM-I. PERRL  Neck: No lymphadenopathy. No JVD. No carotid bruits. Respiratory: Clear to auscultation bilaterally. No wheezes.  No 9: 27 AM     Finalized by (CST): Mark Hancock MD on 12/20/2020 at 9:30 AM                 **Certification      PHYSICIAN Certification of Need for Inpatient Hospitalization - Initial Certification    Patient will require inpatient services that will reasona

## 2020-12-21 LAB
ANION GAP SERPL CALC-SCNC: 1 MMOL/L (ref 0–18)
BASOPHILS # BLD AUTO: 0.02 X10(3) UL (ref 0–0.2)
BASOPHILS NFR BLD AUTO: 0.3 %
BUN BLD-MCNC: 10 MG/DL (ref 7–18)
BUN/CREAT SERPL: 18.9 (ref 10–20)
CALCIUM BLD-MCNC: 8.1 MG/DL (ref 8.5–10.1)
CHLORIDE SERPL-SCNC: 104 MMOL/L (ref 98–112)
CO2 SERPL-SCNC: 32 MMOL/L (ref 21–32)
CREAT BLD-MCNC: 0.53 MG/DL
DEPRECATED RDW RBC AUTO: 51.4 FL (ref 35.1–46.3)
EOSINOPHIL # BLD AUTO: 0.02 X10(3) UL (ref 0–0.7)
EOSINOPHIL NFR BLD AUTO: 0.3 %
ERYTHROCYTE [DISTWIDTH] IN BLOOD BY AUTOMATED COUNT: 14 % (ref 11–15)
GLUCOSE BLD-MCNC: 115 MG/DL (ref 70–99)
HAV IGM SER QL: 1.9 MG/DL (ref 1.6–2.6)
HCT VFR BLD AUTO: 32.9 %
HGB BLD-MCNC: 10.8 G/DL
IMM GRANULOCYTES # BLD AUTO: 0.02 X10(3) UL (ref 0–1)
IMM GRANULOCYTES NFR BLD: 0.3 %
LYMPHOCYTES # BLD AUTO: 0.36 X10(3) UL (ref 1–4)
LYMPHOCYTES NFR BLD AUTO: 5 %
MCH RBC QN AUTO: 33 PG (ref 26–34)
MCHC RBC AUTO-ENTMCNC: 32.8 G/DL (ref 31–37)
MCV RBC AUTO: 100.6 FL
MONOCYTES # BLD AUTO: 0.74 X10(3) UL (ref 0.1–1)
MONOCYTES NFR BLD AUTO: 10.2 %
NEUTROPHILS # BLD AUTO: 6.07 X10 (3) UL (ref 1.5–7.7)
NEUTROPHILS # BLD AUTO: 6.07 X10(3) UL (ref 1.5–7.7)
NEUTROPHILS NFR BLD AUTO: 83.9 %
OSMOLALITY SERPL CALC.SUM OF ELEC: 284 MOSM/KG (ref 275–295)
PLATELET # BLD AUTO: 130 10(3)UL (ref 150–450)
POTASSIUM SERPL-SCNC: 3.9 MMOL/L (ref 3.5–5.1)
RBC # BLD AUTO: 3.27 X10(6)UL
SODIUM SERPL-SCNC: 137 MMOL/L (ref 136–145)
WBC # BLD AUTO: 7.2 X10(3) UL (ref 4–11)

## 2020-12-21 PROCEDURE — 83735 ASSAY OF MAGNESIUM: CPT | Performed by: HOSPITALIST

## 2020-12-21 PROCEDURE — 97110 THERAPEUTIC EXERCISES: CPT

## 2020-12-21 PROCEDURE — 99214 OFFICE O/P EST MOD 30 MIN: CPT

## 2020-12-21 PROCEDURE — 85025 COMPLETE CBC W/AUTO DIFF WBC: CPT | Performed by: HOSPITALIST

## 2020-12-21 PROCEDURE — 97116 GAIT TRAINING THERAPY: CPT

## 2020-12-21 PROCEDURE — 80048 BASIC METABOLIC PNL TOTAL CA: CPT | Performed by: HOSPITALIST

## 2020-12-21 PROCEDURE — 97530 THERAPEUTIC ACTIVITIES: CPT

## 2020-12-21 RX ORDER — TAMSULOSIN HYDROCHLORIDE 0.4 MG/1
0.4 CAPSULE ORAL DAILY
Status: DISCONTINUED | OUTPATIENT
Start: 2020-12-21 | End: 2021-01-07

## 2020-12-21 RX ORDER — SODIUM CHLORIDE, SODIUM LACTATE, POTASSIUM CHLORIDE, CALCIUM CHLORIDE 600; 310; 30; 20 MG/100ML; MG/100ML; MG/100ML; MG/100ML
INJECTION, SOLUTION INTRAVENOUS CONTINUOUS
Status: DISCONTINUED | OUTPATIENT
Start: 2020-12-21 | End: 2020-12-29

## 2020-12-21 RX ORDER — ACETAMINOPHEN 325 MG/1
650 TABLET ORAL EVERY 6 HOURS PRN
Status: DISCONTINUED | OUTPATIENT
Start: 2020-12-21 | End: 2021-01-07

## 2020-12-21 NOTE — PAYOR COMM NOTE
--------------  CONTINUED STAY REVIEW    Payor: Stefani [address] #:  [id number]  Authorization Number: [id number]    Admit date: 12/18/20  Admit time: 1197    Admitting Physician: Roseanne Palencia MD  Attending Physician:  Roseanne Palencia MD  [facility] bruits. Respiratory: Clear to auscultation bilaterally. No wheezes. No rhonchi. Cardiovascular: S1, S2.  Regular rate and rhythm. No murmurs. Equal pulses   Abdomen: Soft, nontender, nondistended. Positive bowel sounds.  No rebound tenderness  Stoma: v

## 2020-12-21 NOTE — HOME CARE LIAISON
Received referral from [address] Unable to accept pt due to staffing. Pt re-referred to several agencies with [facility] becoming available via [employer] and reserved. GEOVANNA Vance updated.

## 2020-12-21 NOTE — PROGRESS NOTES
[facility] Note: Route Optimization for Acetaminophen (OFIRMEV)    Patient is currently on Acetaminophen (OFIRMEV) 1000 mg IV every 6 hours.    The patient meets the criteria to convert to the oral equivalent as established by the IV to Oral conversion pr

## 2020-12-21 NOTE — PAYOR COMM NOTE
--------------  CONTINUED STAY REVIEW    Payor: [address] #:  [id number]  Authorization Number: [id number]    Admit date: 12/18/20  Admit time: 6119    Admitting Physician: Dalia Knox MD  Attending Physician:  Dalia Knox MD  [facility] EOM-I. PERRL  Neck: No lymphadenopathy. No JVD. No carotid bruits. Respiratory: Clear to auscultation bilaterally. No wheezes. No rhonchi. Cardiovascular: S1, S2.  Regular rate and rhythm. No murmurs.  Equal pulses   Abdomen: Soft, nontender, mild dist ileostomy maintained.  Able to void 125 on her own but still required straight cath     Rectal cancer [facility])  POD#3 s/p laparoscopic LARN(taTME) with diverting loop ileostomy     OK to remove stoma bridge  Stoma output very high,will stop Mag oxide  Still wi Intravenous Ivone Morales, RN      magnesium oxide (MAG-OX) tab 400 mg     Date Action Dose Route User    12/21/2020 1847 Given 400 mg Oral Ivone Morales RN      ondansetron HCl [facility]) injection 4 mg     Date Action Dose Route User    12/21/2020 7960 Given 4

## 2020-12-21 NOTE — PROGRESS NOTES
[facility]    Progress Note    Oscar Paez Patient Status:  Inpatient    3/4/1945 MRN [id number]   Location [facility] 4W/SW/SE Attending Maximiliano Sorto MD   Hosp Day # 3 PCP Laura Guzman DO        Subjective:    Oscar Paez is a Intravenous, Once    And    •  [COMPLETED] metRONIDAZOLE in NaCl (FLAGYL) IVPB premix 500 mg, 500 mg, Intravenous, Once    •  [COMPLETED] ondansetron HCl (ZOFRAN) injection 4 mg, 4 mg, Intravenous, Once PRN    •  Normal Saline Flush 0.9 % injection 10 mL, PIGGYBACK:250]  Out: Rad Miner [PIACQ:1102; Drains:465; Stool:2325]    General: No acute distress. Alert and oriented x 3. HEENT: Moist mucous membranes. EOM-I. PERRL  Neck: No lymphadenopathy. No JVD. No carotid bruits.   Respiratory: Clear to auscultation bi could represent partial small bowel obstruction or ileus.    Dictated by (CST): Clinton England MD on 12/20/2020 at 9:27 AM     Finalized by (CST): Clinton England MD on 12/20/2020 at 9:30 AM                  Julia Ferrari MD  12/21/2020

## 2020-12-21 NOTE — PHYSICAL THERAPY NOTE
PHYSICAL THERAPY TREATMENT NOTE - INPATIENT     Room Number: 720/171-H       Presenting Problem: rectal cancer s/p colon resection and ileostomy    Problem List  Active Problems:    Rectal cancer ([facility] [address]    Preop testing      PHYSICAL THERAPY ASSESSMENT Need to walk in hospital room?: A Little   -   Climbing 3-5 steps with a railing?: A Lot     AM-PAC Score:  Raw Score: 16   Approx Degree of Impairment: 54.16%   Standardized Score (AM-PAC Scale): 40.78   CMS Modifier (G-Code): CK    FUNCTIONAL ABILITY STA

## 2020-12-21 NOTE — PROGRESS NOTES
[facility] Hospitalist note    PCP: Sony Acosta DO    Chief Complaint:  F/u LAR     SUBJECTIVE:  Patient denies any chest pain, palpitations, shortness of breath, cough. No more vomiting,  Tolerating FLD.   Not a lot though,  Lots of stool output from ostomy hours.      Meds:     • tamsulosin HCl  0.4 mg Oral Daily   • sodium chloride 0.9%  500 mL Intravenous Once   • Heparin Sodium (Porcine)  5,000 Units Subcutaneous Q8H [facility]   • acetaminophen  1,000 mg Oral Q8H   • gabapentin  200 mg Oral Nightly   • celecoxi

## 2020-12-21 NOTE — WOUND PROGRESS NOTE
WOUND CARE NOTE    History:  Past Medical History:   Diagnosis Date   • Anxiety state    • Back problem     curvature   • Blind     retinitis pigmentosa   • Cancer ([facility] 06/29/2020    rectal cancer   • Disorder of liver     fatty liver - non alcoholic prn  Appliance 2 3/4 inch 2 piece appliance   Teaching: Ostomy folder given  Patient & Sister taught ostomy care.  Response to teaching: good, pt. assisted with the care  [facility] to assist has the pt. has vision impairment  Discharge Recomme  (H) 12/21/2020    CA 8.1 (L) 12/21/2020    ALB 3.6 11/04/2020    ALKPHO 114 11/04/2020    BILT 0.65 11/04/2020    TP 7.9 11/04/2020     (H) 11/04/2020     (H) 11/04/2020    MG 1.9 12/21/2020    PHOS 1.8 (L) 12/19/2020     No results f

## 2020-12-21 NOTE — PLAN OF CARE
Patient sleeping comfortably in bed. Scheduled ofirmev and PRN dilaudid for pain control. Call light within reach. Bed low and locked. Heparin subq for DVT ppx. FLD; with some nausea- zofran. Ambulating with SBA and walker.  PATRICIA drain and ileostomy maintaine risk of injury  - Provide assistive devices as appropriate  - Consider OT/PT consult to assist with strengthening/mobility  - Encourage toileting schedule  Outcome: Progressing     Problem: SKIN INTEGRITY  Goal: Skin integrity remains intact  Description: Enhance eating environment  Outcome: Progressing     Problem: Patient/Family Goals  Goal: Patient/Family Short Term Goal  Description: Patient's Short Term Goal: to be free from pain, nausea and vomiting    Interventions:   -   - See additional Care Plan g

## 2020-12-21 NOTE — CM/SW NOTE
1132: CM self referred for dc planning. PT/OT recommending 24hr care/ [facility] on dc. CM met with pt and sister at bedside. CM verified address and telephone number. Pt lives at home with her sister Elizabeth Rock and her .   They live in a ranch style home with

## 2020-12-22 LAB
ANION GAP SERPL CALC-SCNC: 6 MMOL/L (ref 0–18)
BACTERIA UR QL AUTO: NEGATIVE /HPF
BASOPHILS # BLD AUTO: 0.01 X10(3) UL (ref 0–0.2)
BASOPHILS NFR BLD AUTO: 0.2 %
BILIRUB UR QL: NEGATIVE
BUN BLD-MCNC: 12 MG/DL (ref 7–18)
BUN/CREAT SERPL: 21.4 (ref 10–20)
CALCIUM BLD-MCNC: 8.1 MG/DL (ref 8.5–10.1)
CHLORIDE SERPL-SCNC: 101 MMOL/L (ref 98–112)
CO2 SERPL-SCNC: 29 MMOL/L (ref 21–32)
COLOR UR: YELLOW
CREAT BLD-MCNC: 0.56 MG/DL
DEPRECATED RDW RBC AUTO: 50.4 FL (ref 35.1–46.3)
EOSINOPHIL # BLD AUTO: 0.01 X10(3) UL (ref 0–0.7)
EOSINOPHIL NFR BLD AUTO: 0.2 %
ERYTHROCYTE [DISTWIDTH] IN BLOOD BY AUTOMATED COUNT: 13.6 % (ref 11–15)
GLUCOSE BLD-MCNC: 127 MG/DL (ref 70–99)
GLUCOSE UR-MCNC: NEGATIVE MG/DL
HCT VFR BLD AUTO: 32.9 %
HGB BLD-MCNC: 10.8 G/DL
HGB UR QL STRIP.AUTO: NEGATIVE
HYALINE CASTS #/AREA URNS AUTO: 1 /LPF
IMM GRANULOCYTES # BLD AUTO: 0.02 X10(3) UL (ref 0–1)
IMM GRANULOCYTES NFR BLD: 0.3 %
KETONES UR-MCNC: 20 MG/DL
LEUKOCYTE ESTERASE UR QL STRIP.AUTO: NEGATIVE
LYMPHOCYTES # BLD AUTO: 0.23 X10(3) UL (ref 1–4)
LYMPHOCYTES NFR BLD AUTO: 3.9 %
MCH RBC QN AUTO: 32.8 PG (ref 26–34)
MCHC RBC AUTO-ENTMCNC: 32.8 G/DL (ref 31–37)
MCV RBC AUTO: 100 FL
MONOCYTES # BLD AUTO: 0.61 X10(3) UL (ref 0.1–1)
MONOCYTES NFR BLD AUTO: 10.5 %
NEUTROPHILS # BLD AUTO: 4.95 X10 (3) UL (ref 1.5–7.7)
NEUTROPHILS # BLD AUTO: 4.95 X10(3) UL (ref 1.5–7.7)
NEUTROPHILS NFR BLD AUTO: 84.9 %
NITRITE UR QL STRIP.AUTO: NEGATIVE
OSMOLALITY SERPL CALC.SUM OF ELEC: 283 MOSM/KG (ref 275–295)
PH UR: 5 [PH] (ref 5–8)
PLATELET # BLD AUTO: 147 10(3)UL (ref 150–450)
POTASSIUM SERPL-SCNC: 3.9 MMOL/L (ref 3.5–5.1)
PROT UR-MCNC: 30 MG/DL
RBC # BLD AUTO: 3.29 X10(6)UL
RBC #/AREA URNS AUTO: 2 /HPF
SODIUM SERPL-SCNC: 136 MMOL/L (ref 136–145)
SP GR UR STRIP: 1.02 (ref 1–1.03)
UROBILINOGEN UR STRIP-ACNC: <2
WBC # BLD AUTO: 5.8 X10(3) UL (ref 4–11)
WBC #/AREA URNS AUTO: 6 /HPF

## 2020-12-22 PROCEDURE — 87186 SC STD MICRODIL/AGAR DIL: CPT | Performed by: PHYSICIAN ASSISTANT

## 2020-12-22 PROCEDURE — 99214 OFFICE O/P EST MOD 30 MIN: CPT

## 2020-12-22 PROCEDURE — 97116 GAIT TRAINING THERAPY: CPT

## 2020-12-22 PROCEDURE — 81001 URINALYSIS AUTO W/SCOPE: CPT | Performed by: PHYSICIAN ASSISTANT

## 2020-12-22 PROCEDURE — 85025 COMPLETE CBC W/AUTO DIFF WBC: CPT | Performed by: PHYSICIAN ASSISTANT

## 2020-12-22 PROCEDURE — 80048 BASIC METABOLIC PNL TOTAL CA: CPT | Performed by: PHYSICIAN ASSISTANT

## 2020-12-22 PROCEDURE — 87088 URINE BACTERIA CULTURE: CPT | Performed by: PHYSICIAN ASSISTANT

## 2020-12-22 PROCEDURE — 97110 THERAPEUTIC EXERCISES: CPT

## 2020-12-22 PROCEDURE — 87086 URINE CULTURE/COLONY COUNT: CPT | Performed by: PHYSICIAN ASSISTANT

## 2020-12-22 RX ORDER — TRAMADOL HYDROCHLORIDE 50 MG/1
50 TABLET ORAL EVERY 6 HOURS PRN
Status: DISCONTINUED | OUTPATIENT
Start: 2020-12-22 | End: 2021-01-07

## 2020-12-22 NOTE — PLAN OF CARE
Problem: Patient Centered Care  Goal: Patient preferences are identified and integrated in the patient's plan of care  Description: Interventions:  - What would you like us to know as we care for you?  I live at home with my sister  - Provide timely, comp period  Description: INTERVENTIONS  - Monitor WBC  - Administer growth factors as ordered  - Implement neutropenic guidelines  Outcome: Progressing     Problem: SAFETY ADULT - FALL  Goal: Free from fall injury  Description: INTERVENTIONS:  - Assess pt freq medications  - Provide nonpharmacologic comfort measures as appropriate  - Advance diet as tolerated, if ordered  - Obtain nutritional consult as needed  - Evaluate fluid balance  Outcome: Progressing  Goal: Maintains or returns to baseline bowel function

## 2020-12-22 NOTE — PROGRESS NOTES
[facility]    Progress Note    Jamir Gusman Patient Status:  Inpatient    3/4/1945 MRN [id number]   Location [facility] 4W/SW/SE Attending Inocencia Hennessy MD   Hosp Day # 4 PCP Horace Raygoza DO        Subjective:    Jamir Gusman Once    •  [COMPLETED] celecoxib (CeleBREX) cap 200 mg, 200 mg, Oral, Once    •  [COMPLETED] ceFAZolin sodium (ANCEF/KEFZOL) 2 GM/20ML premix IV syringe 2 g, 2 g, Intravenous, Once    And    •  [COMPLETED] metRONIDAZOLE in NaCl (FLAGYL) IVPB premix 500 mg, bruits. Respiratory: Clear to auscultation bilaterally. Cardiovascular: S1, S2.  Regular rate and rhythm. Abdomen: Soft, nontender, mildly distended. Positive bowel sounds.  No rebound tenderness  Stoma: viable, edematous, brown liquid output and flat

## 2020-12-22 NOTE — PROGRESS NOTES
[facility] Hospitalist note    PCP: Vero Mccormack DO    Chief Complaint:  F/u LAR     SUBJECTIVE:  More nausea today, dry heaving  Less gas from ostomy, still with liquid stool but less  Still having trouble initiating urinating  No CP or SOB.   No fevers (Porcine)  5,000 Units Subcutaneous [facility]   • acetaminophen  1,000 mg Oral Q8H   • gabapentin  200 mg Oral Nightly   • celecoxib  200 mg Oral Q12H     • lactated ringers 100 mL/hr at 12/21/20 2221     traMADol HCl, acetaminophen, simethicone, Calcium Car

## 2020-12-22 NOTE — PHYSICAL THERAPY NOTE
PHYSICAL THERAPY TREATMENT NOTE - INPATIENT     Room Number: 809/323-G       Presenting Problem: rectal cancer s/p colon resection and ileostomy    Problem List  Active Problems:    Rectal cancer ([facility] [address]    Preop testing      PHYSICAL THERAPY ASSESSMENT standing up from a chair with arms (e.g., wheelchair, bedside commode, etc.): A Little   -   Moving from lying on back to sitting on the side of the bed?: A Lot   How much help from another person does the patient currently need. ..   -   Moving to and from

## 2020-12-22 NOTE — WOUND PROGRESS NOTE
Ostomy Care Services  Follow up to continue teaching the pt. and sister, the pt. is in the restroom with the nurse, she is still having difficulty urinating, I spoke with the pt's sister, whom the pt. lives with and answered all of her ileostomy questions.

## 2020-12-23 LAB
ANION GAP SERPL CALC-SCNC: 4 MMOL/L (ref 0–18)
BASOPHILS # BLD AUTO: 0.01 X10(3) UL (ref 0–0.2)
BASOPHILS NFR BLD AUTO: 0.2 %
BUN BLD-MCNC: 11 MG/DL (ref 7–18)
BUN/CREAT SERPL: 26.2 (ref 10–20)
CALCIUM BLD-MCNC: 8.2 MG/DL (ref 8.5–10.1)
CHLORIDE SERPL-SCNC: 102 MMOL/L (ref 98–112)
CO2 SERPL-SCNC: 30 MMOL/L (ref 21–32)
CREAT BLD-MCNC: 0.42 MG/DL
DEPRECATED RDW RBC AUTO: 48.5 FL (ref 35.1–46.3)
EOSINOPHIL # BLD AUTO: 0.01 X10(3) UL (ref 0–0.7)
EOSINOPHIL NFR BLD AUTO: 0.2 %
ERYTHROCYTE [DISTWIDTH] IN BLOOD BY AUTOMATED COUNT: 13.7 % (ref 11–15)
GLUCOSE BLD-MCNC: 111 MG/DL (ref 70–99)
HAV IGM SER QL: 1.8 MG/DL (ref 1.6–2.6)
HCT VFR BLD AUTO: 30.2 %
HGB BLD-MCNC: 10.3 G/DL
IMM GRANULOCYTES # BLD AUTO: 0.02 X10(3) UL (ref 0–1)
IMM GRANULOCYTES NFR BLD: 0.3 %
LYMPHOCYTES # BLD AUTO: 0.32 X10(3) UL (ref 1–4)
LYMPHOCYTES NFR BLD AUTO: 5.3 %
MCH RBC QN AUTO: 33.3 PG (ref 26–34)
MCHC RBC AUTO-ENTMCNC: 34.1 G/DL (ref 31–37)
MCV RBC AUTO: 97.7 FL
MONOCYTES # BLD AUTO: 0.64 X10(3) UL (ref 0.1–1)
MONOCYTES NFR BLD AUTO: 10.6 %
NEUTROPHILS # BLD AUTO: 5.01 X10 (3) UL (ref 1.5–7.7)
NEUTROPHILS # BLD AUTO: 5.01 X10(3) UL (ref 1.5–7.7)
NEUTROPHILS NFR BLD AUTO: 83.4 %
OSMOLALITY SERPL CALC.SUM OF ELEC: 282 MOSM/KG (ref 275–295)
PHOSPHATE SERPL-MCNC: 2.9 MG/DL (ref 2.5–4.9)
PLATELET # BLD AUTO: 147 10(3)UL (ref 150–450)
POTASSIUM SERPL-SCNC: 3.5 MMOL/L (ref 3.5–5.1)
RBC # BLD AUTO: 3.09 X10(6)UL
SODIUM SERPL-SCNC: 136 MMOL/L (ref 136–145)
WBC # BLD AUTO: 6 X10(3) UL (ref 4–11)

## 2020-12-23 PROCEDURE — 97116 GAIT TRAINING THERAPY: CPT

## 2020-12-23 PROCEDURE — 97535 SELF CARE MNGMENT TRAINING: CPT

## 2020-12-23 PROCEDURE — 83735 ASSAY OF MAGNESIUM: CPT | Performed by: PHYSICIAN ASSISTANT

## 2020-12-23 PROCEDURE — 97530 THERAPEUTIC ACTIVITIES: CPT

## 2020-12-23 PROCEDURE — 97110 THERAPEUTIC EXERCISES: CPT

## 2020-12-23 PROCEDURE — 80048 BASIC METABOLIC PNL TOTAL CA: CPT | Performed by: PHYSICIAN ASSISTANT

## 2020-12-23 PROCEDURE — 84100 ASSAY OF PHOSPHORUS: CPT | Performed by: PHYSICIAN ASSISTANT

## 2020-12-23 PROCEDURE — 85025 COMPLETE CBC W/AUTO DIFF WBC: CPT | Performed by: PHYSICIAN ASSISTANT

## 2020-12-23 PROCEDURE — 99213 OFFICE O/P EST LOW 20 MIN: CPT

## 2020-12-23 NOTE — PAYOR COMM NOTE
--------------  CONTINUED STAY REVIEW    Payor: [address] [place] [address] #:  [id number]  Authorization Number: [id number]    Admit date: 12/18/20  Admit time: 5269    Admitting Physician: Elizabeth Boykin MD  Attending Physician:  Elizabeth Boykin MD  [facility] S2.  Regular rate and rhythm. Abdomen: Soft, nontender, mildly distended. Positive bowel sounds.  No rebound tenderness  Stoma: viable, edematous, brown liquid output and flatus  Incision(s): C/D/I x 4  Drain: serosang  Neurologic: No focal neurological Candelaria Morales RN      lactated ringers infusion 100 ML HR      Date Action Dose Route User    12/23/2020 Kusum Madison Do [facility] 1263 (none) Intravenous Ai Ulrich RN    12/22/2020 2201 New Bag (none) Intravenous Ai Ulrich RN      ondansetron HCl [facility] inject

## 2020-12-23 NOTE — PHYSICAL THERAPY NOTE
PHYSICAL THERAPY TREATMENT NOTE - INPATIENT     Room Number: 992/020-T       Presenting Problem: rectal cancer s/p colon resection and ileostomy    Problem List  Active Problems:    Rectal cancer ([facility] [address]    Preop testing      PHYSICAL THERAPY ASSESSMENT standing up from a chair with arms (e.g., wheelchair, bedside commode, etc.): A Little   -   Moving from lying on back to sitting on the side of the bed?: A Lot   How much help from another person does the patient currently need. ..   -   Moving to and from

## 2020-12-23 NOTE — PROGRESS NOTES
[facility] Hospitalist note    PCP: Zakiya Campuzano DO    Chief Complaint:  F/u LAR     SUBJECTIVE:  -less nausea, no Cp or SOB  -more gas inostomy   -able to urinate more   No CP or SOB.   No fevers     OBJECTIVE:  Temp:  [97.9 °F (36.6 °C)-98.6 °F (37 °C) the last 168 hours.       Meds:     • tamsulosin HCl  0.4 mg Oral Daily   • sodium chloride 0.9%  500 mL Intravenous Once   • Heparin Sodium (Porcine)  5,000 Units Subcutaneous Q8H [facility]   • acetaminophen  1,000 mg Oral Q8H   • gabapentin  200 mg Oral Nightly

## 2020-12-23 NOTE — DIETARY NOTE
Educated pt and sister on low fiber/ileostomy diet x6 weeks per MD order. Provided handout and reviewed material with them. Discussed need for nutritional supplement (ensure enlive or glucerna if high sugar bothering pt).  Provided contact information for

## 2020-12-23 NOTE — WOUND PROGRESS NOTE
Wound and Ostomy Care Services  Follow up to continue ileostomy care teaching, the pt. was in the restroom and walked back to the chair, her sister is at the bedside.  The pt. emptied her own pouch, she removed her appliance, cleansed her skin, applied skin

## 2020-12-23 NOTE — OCCUPATIONAL THERAPY NOTE
OCCUPATIONAL THERAPY TREATMENT NOTE - INPATIENT        Room Number: 461/683-N           Presenting Problem: (s/p colon resection & ileostomy due to rectal CA )    Problem List  Active Problems:    Rectal cancer ([address]    Preop testing      OCCUPATIONAL THER Restriction: None                PAIN ASSESSMENT  Rating: Unable to rate  Location: (abdomen)  Management Techniques: Repositioning;Relaxation     ACTIVITY TOLERANCE      WNL                   O2 SATURATIONS      WNL          ACTIVITIES OF DAILY LIVING ASS mod a LE dressing           Goals  on: 2020  Frequency: 3-5x/week     9 [facility] MOT/R  200  35 [facility]  #[id number]

## 2020-12-23 NOTE — PLAN OF CARE
Problem: Patient Centered Care  Goal: Patient preferences are identified and integrated in the patient's plan of care  Description: Interventions:  - What would you like us to know as we care for you?  I live at home with my sister  - Provide timely, comp limitations  - Instruct pt to call for assistance with activity based on assessment  - Modify environment to reduce risk of injury  - Provide assistive devices as appropriate  - Consider OT/PT consult to assist with strengthening/mobility  - Encourage toil as needed  - Establish a toileting routine/schedule  - Consider collaborating with pharmacy to review patient's medication profile  Outcome: Progressing  Goal: Maintains adequate nutritional intake (undernourished)  Description: INTERVENTIONS:  - Monitor p

## 2020-12-24 ENCOUNTER — APPOINTMENT (OUTPATIENT)
Dept: GENERAL RADIOLOGY | Facility: HOSPITAL | Age: 75
DRG: 330 | End: 2020-12-24
Attending: PHYSICIAN ASSISTANT
Payer: MEDICARE

## 2020-12-24 LAB
ANION GAP SERPL CALC-SCNC: 4 MMOL/L (ref 0–18)
BASOPHILS # BLD AUTO: 0.01 X10(3) UL (ref 0–0.2)
BASOPHILS NFR BLD AUTO: 0.2 %
BILIRUB UR QL: NEGATIVE
BUN BLD-MCNC: 7 MG/DL (ref 7–18)
BUN/CREAT SERPL: 13.2 (ref 10–20)
CALCIUM BLD-MCNC: 8.3 MG/DL (ref 8.5–10.1)
CHLORIDE SERPL-SCNC: 99 MMOL/L (ref 98–112)
CLARITY UR: CLEAR
CO2 SERPL-SCNC: 31 MMOL/L (ref 21–32)
COLOR UR: YELLOW
CREAT BLD-MCNC: 0.53 MG/DL
DEPRECATED RDW RBC AUTO: 48 FL (ref 35.1–46.3)
EOSINOPHIL # BLD AUTO: 0.04 X10(3) UL (ref 0–0.7)
EOSINOPHIL NFR BLD AUTO: 0.6 %
ERYTHROCYTE [DISTWIDTH] IN BLOOD BY AUTOMATED COUNT: 13.6 % (ref 11–15)
GLUCOSE BLD-MCNC: 115 MG/DL (ref 70–99)
GLUCOSE UR-MCNC: NEGATIVE MG/DL
HAV IGM SER QL: 1.9 MG/DL (ref 1.6–2.6)
HCT VFR BLD AUTO: 30.7 %
HGB BLD-MCNC: 10.5 G/DL
IMM GRANULOCYTES # BLD AUTO: 0.05 X10(3) UL (ref 0–1)
IMM GRANULOCYTES NFR BLD: 0.8 %
LEUKOCYTE ESTERASE UR QL STRIP.AUTO: NEGATIVE
LYMPHOCYTES # BLD AUTO: 0.27 X10(3) UL (ref 1–4)
LYMPHOCYTES NFR BLD AUTO: 4.4 %
MCH RBC QN AUTO: 33.3 PG (ref 26–34)
MCHC RBC AUTO-ENTMCNC: 34.2 G/DL (ref 31–37)
MCV RBC AUTO: 97.5 FL
MONOCYTES # BLD AUTO: 0.73 X10(3) UL (ref 0.1–1)
MONOCYTES NFR BLD AUTO: 11.8 %
NEUTROPHILS # BLD AUTO: 5.1 X10 (3) UL (ref 1.5–7.7)
NEUTROPHILS # BLD AUTO: 5.1 X10(3) UL (ref 1.5–7.7)
NEUTROPHILS NFR BLD AUTO: 82.2 %
NITRITE UR QL STRIP.AUTO: NEGATIVE
OSMOLALITY SERPL CALC.SUM OF ELEC: 277 MOSM/KG (ref 275–295)
PH UR: 6 [PH] (ref 5–8)
PHOSPHATE SERPL-MCNC: 2.9 MG/DL (ref 2.5–4.9)
PLATELET # BLD AUTO: 157 10(3)UL (ref 150–450)
POTASSIUM SERPL-SCNC: 3.4 MMOL/L (ref 3.5–5.1)
PROT UR-MCNC: NEGATIVE MG/DL
RBC # BLD AUTO: 3.15 X10(6)UL
RBC #/AREA URNS AUTO: 2 /HPF
SODIUM SERPL-SCNC: 134 MMOL/L (ref 136–145)
SP GR UR STRIP: 1.01 (ref 1–1.03)
UROBILINOGEN UR STRIP-ACNC: <2
WBC # BLD AUTO: 6.2 X10(3) UL (ref 4–11)
WBC #/AREA URNS AUTO: 1 /HPF

## 2020-12-24 PROCEDURE — 97116 GAIT TRAINING THERAPY: CPT

## 2020-12-24 PROCEDURE — 99213 OFFICE O/P EST LOW 20 MIN: CPT

## 2020-12-24 PROCEDURE — 81001 URINALYSIS AUTO W/SCOPE: CPT | Performed by: HOSPITALIST

## 2020-12-24 PROCEDURE — 85025 COMPLETE CBC W/AUTO DIFF WBC: CPT | Performed by: PHYSICIAN ASSISTANT

## 2020-12-24 PROCEDURE — 74019 RADEX ABDOMEN 2 VIEWS: CPT | Performed by: PHYSICIAN ASSISTANT

## 2020-12-24 PROCEDURE — 83735 ASSAY OF MAGNESIUM: CPT | Performed by: PHYSICIAN ASSISTANT

## 2020-12-24 PROCEDURE — 80048 BASIC METABOLIC PNL TOTAL CA: CPT | Performed by: PHYSICIAN ASSISTANT

## 2020-12-24 PROCEDURE — 84100 ASSAY OF PHOSPHORUS: CPT | Performed by: PHYSICIAN ASSISTANT

## 2020-12-24 PROCEDURE — 97110 THERAPEUTIC EXERCISES: CPT

## 2020-12-24 PROCEDURE — 97535 SELF CARE MNGMENT TRAINING: CPT

## 2020-12-24 NOTE — PHYSICAL THERAPY NOTE
PHYSICAL THERAPY TREATMENT NOTE - INPATIENT     Room Number: 349/993-Y       Presenting Problem: rectal cancer s/p colon resection and ileostomy    Problem List  Active Problems:    Rectal cancer ([facility] [address]    Preop testing      PHYSICAL THERAPY ASSESSMENT (e.g., wheelchair, bedside commode, etc.): A Little   -   Moving from lying on back to sitting on the side of the bed?: A Lot   How much help from another person does the patient currently need. ..   -   Moving to and from a bed to a chair (including a whee

## 2020-12-24 NOTE — PLAN OF CARE
No acute changes overnight. Lap sites are C/D/I. PATRICIA drain x1, output monitored. Ileostomy with stool and gas present. Patient voiding small amounts, straight cath x1 for bladder scan >400. Tolerating some fulls liquids, intermittent nausea and dry heaves. and/or relaxation techniques  - Monitor for opioid side effects  - Notify MD/LIP if interventions unsuccessful or patient reports new pain  - Anticipate increased pain with activity and pre-medicate as appropriate  Outcome: Progressing     Problem: RISK FO ostomies/wounds/G-tubes  Outcome: Progressing     Problem: GASTROINTESTINAL - ADULT  Goal: Minimal or absence of nausea and vomiting  Description: INTERVENTIONS:  - Maintain adequate hydration with IV or PO as ordered and tolerated  - Nasogastric tube to l

## 2020-12-24 NOTE — WOUND PROGRESS NOTE
Wound & Ostomy Care Services  Follow up to continue teaching ileostomy care. The pt. is up in the chair, she is trying to eat some yogurt and soup. She notes she feels bloated, asked her to take her time, not to make her self sick.  She noted she voided a l

## 2020-12-24 NOTE — DIETARY NOTE
ADULT NUTRITION INITIAL ASSESSMENT    Pt is at moderate nutrition risk. Pt does not meet malnutrition criteria.       RECOMMENDATIONS TO MD:  See Nutrition Intervention     NUTRITION DIAGNOSIS/PROBLEM:  Inadequate oral intake related to alteration in gas (overactive bladder), Osteoarthritis, Personal history of antineoplastic chemotherapy, and Retinitis pigmentosa. ANTHROPOMETRICS:  HT: 151.1 cm (4' 11.5\")  WT: 74.8 kg (165 lb)   BMI: Body mass index is 32.77 kg/m².   BMI CLASSIFICATION: 30-34.9 kg/m2 Accumulation: none per visual exam    - Skin Integrity: surgical wounds and at risk. MONITOR AND EVALUATE/NUTRITION GOALS:  - Food and Nutrient Intake:      Monitor: adequacy of PO intake and adequacy of supplement intake.     - Anthropometric Measuremen

## 2020-12-24 NOTE — OCCUPATIONAL THERAPY NOTE
OCCUPATIONAL THERAPY TREATMENT NOTE - INPATIENT        Room Number: 790/578-L           Presenting Problem: (s/p colon resection & ileostomy due to rectal CA )    Problem List  Active Problems:    Rectal cancer ([address]    Preop testing      OCCUPATIONAL THER rinsing, drying)?: A Little  -   Toileting, which includes using toilet, bedpan or urinal? : A Little  -   Putting on and taking off regular upper body clothing?: A Little  -   Taking care of personal grooming such as brushing teeth?: None  -   Eating meal

## 2020-12-24 NOTE — SPIRITUAL CARE NOTE
Pt was peaceful, sister Tima Slater at bedside. Pt talked about her life and veena, and how to build conneciton with the forever presence of God in her life. Pt never , but was well supported by her sister. she appreciated to have communion.   will

## 2020-12-24 NOTE — PAYOR COMM NOTE
--------------  CONTINUED STAY REVIEW    Payor: [address] [place] [address] #:  [id number]  Authorization Number: [id number]    Admit date: 12/18/20  Admit time: 9581    Admitting Physician: Huyen Diggs MD  Attending Physician:  Huyen Diggs MD  [facility] slow  - ostomy teaching today at bedside with sister      Thrombocytopenia  -plts stable, improving      Urinary retention  -is voiding but intermittently requiring straight cath with 450 out (last one evening of 12/23)  -often misses hat so hard to [employer] Bag (none) Intravenous Jaleel Sykes RN      lidocaine-menthol 4-1 % 1 patch     Date Action Dose Route User    12/24/2020 0907 Patch Applied 1 patch Transdermal (Right Lower Back) Lexi Curtis RN      ondansetron HCl [facility]) injection 4 mg     Date A

## 2020-12-24 NOTE — PROGRESS NOTES
[facility]    Progress Note    Marc Peer Patient Status:  Inpatient    3/4/1945 MRN [id number]   Location [facility] 4W/SW/SE Attending Mayra Bazan MD   Hosp Day # 6 PCP Matilde Officer, DO        Subjective:    Marc Peer [COMPLETED] acetaminophen (TYLENOL EXTRA STRENGTH) tab 1,000 mg, 1,000 mg, Oral, Once    •  [COMPLETED] famoTIDine (PEPCID) tab 20 mg, 20 mg, Oral, Once    •  [COMPLETED] Metoclopramide HCl (REGLAN) tab 10 mg, 10 mg, Oral, Once    •  [COMPLETED] Heparin So 59.5\", weight 165 lb (74.8 kg), SpO2 94 %, not currently breastfeeding. I/O last 3 completed shifts: In: 2565 [P.O.:365; I.V.:2200]  Out: 2184 [Urine:985; Drains:590; Stool:1770]    General: No acute distress. Alert and oriented x 3.   HEENT: Moist mu MG 1.9 12/24/2020    PHOS 2.9 12/24/2020       No results found.

## 2020-12-24 NOTE — PROGRESS NOTES
[facility] Hospitalist Progress Note     PCP: Carlos Malik DO    CC: Follow up       Assessment/Plan:   Ms. Behzad Deutsch is a 75 yo F with PMH of rectal CA, anxiety, HTN, who presented for LAR. Course complicated by N/V with imaging with SBO vs ileus.   Does ha currently is better  No CP, SOB     Objective     OBJECTIVE:  Temp:  [98.1 °F (36.7 °C)-98.6 °F (37 °C)] 98.4 °F (36.9 °C)  Pulse:  [76-86] 81  Resp:  [16-20] 18  BP: (142-160)/(67-69) 160/68    Intake/Output:    Intake/Output Summary (Last 24 hours) at 12 12/22/20  1314 12/23/20  0630 12/24/20  0603    141 137 136 136 134*   K 3.9 3.8 3.9 3.9 3.5 3.4*    107 104 101 102 99   CO2 27.0 28.0 32.0 29.0 30.0 31.0   BUN 9 13 10 12 11 7   CREATSERUM 0.84 0.69 0.53* 0.56 0.42* 0.53*   CA 7.8* 8.2* 8.1*

## 2020-12-25 LAB
ANION GAP SERPL CALC-SCNC: 3 MMOL/L (ref 0–18)
BASOPHILS # BLD AUTO: 0.02 X10(3) UL (ref 0–0.2)
BASOPHILS NFR BLD AUTO: 0.3 %
BUN BLD-MCNC: 9 MG/DL (ref 7–18)
BUN/CREAT SERPL: 18.8 (ref 10–20)
CALCIUM BLD-MCNC: 8.5 MG/DL (ref 8.5–10.1)
CHLORIDE SERPL-SCNC: 102 MMOL/L (ref 98–112)
CO2 SERPL-SCNC: 32 MMOL/L (ref 21–32)
CREAT BLD-MCNC: 0.48 MG/DL
DEPRECATED RDW RBC AUTO: 49.1 FL (ref 35.1–46.3)
EOSINOPHIL # BLD AUTO: 0.05 X10(3) UL (ref 0–0.7)
EOSINOPHIL NFR BLD AUTO: 0.8 %
ERYTHROCYTE [DISTWIDTH] IN BLOOD BY AUTOMATED COUNT: 13.6 % (ref 11–15)
GLUCOSE BLD-MCNC: 101 MG/DL (ref 70–99)
HAV IGM SER QL: 1.8 MG/DL (ref 1.6–2.6)
HCT VFR BLD AUTO: 30.1 %
HGB BLD-MCNC: 10 G/DL
IMM GRANULOCYTES # BLD AUTO: 0.05 X10(3) UL (ref 0–1)
IMM GRANULOCYTES NFR BLD: 0.8 %
LYMPHOCYTES # BLD AUTO: 0.33 X10(3) UL (ref 1–4)
LYMPHOCYTES NFR BLD AUTO: 5.5 %
MCH RBC QN AUTO: 33.1 PG (ref 26–34)
MCHC RBC AUTO-ENTMCNC: 33.2 G/DL (ref 31–37)
MCV RBC AUTO: 99.7 FL
MONOCYTES # BLD AUTO: 0.72 X10(3) UL (ref 0.1–1)
MONOCYTES NFR BLD AUTO: 12 %
NEUTROPHILS # BLD AUTO: 4.81 X10 (3) UL (ref 1.5–7.7)
NEUTROPHILS # BLD AUTO: 4.81 X10(3) UL (ref 1.5–7.7)
NEUTROPHILS NFR BLD AUTO: 80.6 %
OSMOLALITY SERPL CALC.SUM OF ELEC: 283 MOSM/KG (ref 275–295)
PHOSPHATE SERPL-MCNC: 3.7 MG/DL (ref 2.5–4.9)
PLATELET # BLD AUTO: 177 10(3)UL (ref 150–450)
POTASSIUM SERPL-SCNC: 4.2 MMOL/L (ref 3.5–5.1)
RBC # BLD AUTO: 3.02 X10(6)UL
SODIUM SERPL-SCNC: 137 MMOL/L (ref 136–145)
WBC # BLD AUTO: 6 X10(3) UL (ref 4–11)

## 2020-12-25 PROCEDURE — 80048 BASIC METABOLIC PNL TOTAL CA: CPT | Performed by: PHYSICIAN ASSISTANT

## 2020-12-25 PROCEDURE — 85025 COMPLETE CBC W/AUTO DIFF WBC: CPT | Performed by: PHYSICIAN ASSISTANT

## 2020-12-25 PROCEDURE — 84100 ASSAY OF PHOSPHORUS: CPT | Performed by: PHYSICIAN ASSISTANT

## 2020-12-25 PROCEDURE — 83735 ASSAY OF MAGNESIUM: CPT | Performed by: PHYSICIAN ASSISTANT

## 2020-12-25 RX ORDER — MAGNESIUM SULFATE HEPTAHYDRATE 40 MG/ML
2 INJECTION, SOLUTION INTRAVENOUS ONCE
Status: COMPLETED | OUTPATIENT
Start: 2020-12-25 | End: 2020-12-25

## 2020-12-25 RX ORDER — METOCLOPRAMIDE HYDROCHLORIDE 5 MG/ML
10 INJECTION INTRAMUSCULAR; INTRAVENOUS EVERY 6 HOURS
Status: DISCONTINUED | OUTPATIENT
Start: 2020-12-25 | End: 2021-01-06

## 2020-12-25 NOTE — PLAN OF CARE
Pt alert and oriented x4, forgetful at times. VSS. On room air. Heparin for DVT prophylaxis. FLD, pt vomitted 1x, zofran prn given 1x. PRN simethicone given. Tylenol given prn + Lidocaine patch for pain. Ileostomy with good gas and stool output.  Voiding in non-pharmacological measures as appropriate and evaluate response  - Consider cultural and social influences on pain and pain management  - Manage/alleviate anxiety  - Utilize distraction and/or relaxation techniques  - Monitor for opioid side effects  - N and implement wound/incision site care and dressing changes as ordered  - Position infant to avoid placing pressure on wound  - Enterostomal/Wound therapy consult as indicated for ostomies/wounds/G-tubes  Outcome: Progressing     Problem: GASTROINTESTINAL

## 2020-12-25 NOTE — PROGRESS NOTES
[facility]    Progress Note    Zaida Jozef Patient Status:  Inpatient    3/4/1945 MRN [id number]   Location [facility] 4W/SW/SE Attending Jeevan Carr MD   Hosp Day # 7 PCP Prashanth Rob DO        Subjective:    Zaida Kanab Calcium Carbonate Antacid (TUMS) chewable tab 500 mg, 500 mg, Oral, BID PRN    •  [COMPLETED] acetaminophen (TYLENOL EXTRA STRENGTH) tab 1,000 mg, 1,000 mg, Oral, Once    •  [COMPLETED] famoTIDine (PEPCID) tab 20 mg, 20 mg, Oral, Once    •  [COMPLETED] Met shifts: In: 0686 [P.O.:720; I.V.:3400]  Out: 5100 [Urine:1885; Drains:715; Stool:2500]    General: No acute distress. Alert and oriented x 3. HEENT: Moist mucous membranes. EOM-I. PERRL  Neck: No lymphadenopathy. No JVD. No carotid bruits.   Respiratory: 12/24/2020  CONCLUSION:   No evidence of bowel obstruction. Small bilateral pleural effusions.     Dictated by (CST): Sonja Braga MD on 12/24/2020 at 1:10 PM     Finalized by (CST): Sonja Braga MD on 12/24/2020 at 1:14 PM

## 2020-12-25 NOTE — PLAN OF CARE
Pt A/O x4, can be forgetful & anxious at times. Santos Salguero for bowel management. Tolerating fulls/ERAS protocol. Mag replaced per protocol. PATRICIA drain to bulb suction. Colostomy in place & draining. Surgical sites C/D/I.  Voiding small amounts, bladder scan Manage/alleviate anxiety  - Utilize distraction and/or relaxation techniques  - Monitor for opioid side effects  - Notify MD/LIP if interventions unsuccessful or patient reports new pain  - Anticipate increased pain with activity and pre-medicate as approp Enterostomal/Wound therapy consult as indicated for ostomies/wounds/G-tubes  Outcome: Progressing     Problem: GASTROINTESTINAL - ADULT  Goal: Minimal or absence of nausea and vomiting  Description: INTERVENTIONS:  - Maintain adequate hydration with IV or

## 2020-12-25 NOTE — PLAN OF CARE
Patient still with nausea overnight, PRN zofran, reglan and tums given. No vomiting noted. IVP dilaudid given x1 for pain management. Per patient nausea is increased with pills. Lap sites are C/D/I. PATRICIA drain x1, output monitored.  Ileostomy with stool and g response  - Implement non-pharmacological measures as appropriate and evaluate response  - Consider cultural and social influences on pain and pain management  - Manage/alleviate anxiety  - Utilize distraction and/or relaxation techniques  - Monitor for op Collaborate with physician/APN and implement wound/incision site care and dressing changes as ordered  - Position infant to avoid placing pressure on wound  - Enterostomal/Wound therapy consult as indicated for ostomies/wounds/G-tubes  Outcome: Progressing

## 2020-12-25 NOTE — PROGRESS NOTES
[facility] Hospitalist Progress Note     PCP: Christian Carlos DO    CC: Follow up       Assessment/Plan:   Ms. Bandar Urbina is a 75 yo Female with PMHx of rectal CA, anxiety, HTN, who presented for LAR. Course complicated by N/V with imaging with SBO vs ileus.  Chatterjee 169 lb (76.7 kg)  11/04/20 1427 : 172 lb (78 kg)      Exam  Gen: No acute distress, alert and oriented x3, visually impaired   Pulm: Lungs clear, normal respiratory effort  CV: Heart with regular rate and rhythm  Abd: Abdomen soft, ostomy with gas and dark Dictated by (CST): Alyssa Rodríguez MD on 12/24/2020 at 1:10 PM     Finalized by (CST): Alyssa Rodríguez MD on 12/24/2020 at 1:14 PM          Xr Abdomen Obstructive Series Routine(2 Vw)(cpt=74019)    Result Date: 12/20/2020  CONCLUSION:  Dilated small kandi

## 2020-12-26 LAB — HAV IGM SER QL: 2.1 MG/DL (ref 1.6–2.6)

## 2020-12-26 PROCEDURE — 97535 SELF CARE MNGMENT TRAINING: CPT

## 2020-12-26 PROCEDURE — 83735 ASSAY OF MAGNESIUM: CPT | Performed by: INTERNAL MEDICINE

## 2020-12-26 NOTE — OCCUPATIONAL THERAPY NOTE
OCCUPATIONAL THERAPY TREATMENT NOTE - INPATIENT        Room Number: 367/470-X           Presenting Problem: (s/p colon resection & ileostomy due to rectal CA )    Problem List  Active Problems:    Rectal cancer ([facility] [address]    Preop testing      OCCUPATIONAL THER TOLERANCE                         O2 SATURATIONS                ACTIVITIES OF DAILY LIVING ASSESSMENT  AM-PAC ‘6-Clicks’ Inpatient Daily Activity Short Form  How much help from another person does the patient currently need…  -   Putting on and taking off in [facility] reacher and SA            Goals  on: 2020  Frequency: 3-5x/week

## 2020-12-26 NOTE — PLAN OF CARE
Patient alert and oriented, forgetful and anxious at times. On room air. 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 Patient states some tenderness in her abdomen. Scheduled reglan continued. ERAS protocol continued. Patient tolerating full liquid diet.  Caleb Overton non-pharmacological measures as appropriate and evaluate response  - Consider cultural and social influences on pain and pain management  - Manage/alleviate anxiety  - Utilize distraction and/or relaxation techniques  - Monitor for opioid side effects  - N and implement wound/incision site care and dressing changes as ordered  - Position infant to avoid placing pressure on wound  - Enterostomal/Wound therapy consult as indicated for ostomies/wounds/G-tubes  Outcome: Progressing     Problem: GASTROINTESTINAL

## 2020-12-26 NOTE — PROGRESS NOTES
[facility]    Progress Note    Khris Wattsna Patient Status:  Inpatient    3/4/1945 MRN [id number]   Location [facility] 4W/SW/SE Attending Huyen Diggs MD   Hosp Day # 8 PCP Ari Lee DO        Subjective:    Khris Amaro Once    •  Calcium Carbonate Antacid (TUMS) chewable tab 500 mg, 500 mg, Oral, BID PRN    •  [COMPLETED] acetaminophen (TYLENOL EXTRA STRENGTH) tab 1,000 mg, 1,000 mg, Oral, Once    •  [COMPLETED] famoTIDine (PEPCID) tab 20 mg, 20 mg, Oral, Once    •  [COM shifts: In: 2462 [I.V.:2462]  Out: 4980 [Urine:1650; Drains:755; Stool:2575]    General: No acute distress. Alert and oriented x 3. HEENT: Moist mucous membranes. EOM-I. PERRL  Neck: No lymphadenopathy. No JVD. No carotid bruits.   Respiratory: Clear to Date: 12/24/2020  CONCLUSION:   No evidence of bowel obstruction. Small bilateral pleural effusions.     Dictated by (CST): Latha Vee MD on 12/24/2020 at 1:10 PM     Finalized by (CST): Latha Vee MD on 12/24/2020 at 1:14 PM

## 2020-12-26 NOTE — PROGRESS NOTES
[facility] Hospitalist Progress Note     PCP: Janette Cannon DO    CC: Follow up       Assessment/Plan:   Ms. Blade Roberson is a 77 yo Female with PMHx of rectal CA, anxiety, HTN, who presented for LAR. Course complicated by N/V with imaging with SBO vs ileus.  Chatterjee lb (76.7 kg)  11/04/20 1427 : 172 lb (78 kg)      Exam  Gen: No acute distress, alert and oriented x3, visually impaired   Pulm: Lungs clear, normal respiratory effort  CV: Heart with regular rate and rhythm  Abd: Abdomen soft, ostomy with gas and dark bro MD LACI on 12/24/2020 at 1:10 PM     Finalized by (CST): Caldwell Cushing, MD on 12/24/2020 at 1:14 PM          Xr Abdomen Obstructive Series Routine(2 Vw)(cpt=74019)    Result Date: 12/20/2020  CONCLUSION:  Dilated small bowel loops in the right upper quadr

## 2020-12-27 LAB
ANION GAP SERPL CALC-SCNC: 4 MMOL/L (ref 0–18)
BASOPHILS # BLD AUTO: 0.02 X10(3) UL (ref 0–0.2)
BASOPHILS NFR BLD AUTO: 0.3 %
BUN BLD-MCNC: 7 MG/DL (ref 7–18)
BUN/CREAT SERPL: 14.3 (ref 10–20)
CALCIUM BLD-MCNC: 8.4 MG/DL (ref 8.5–10.1)
CHLORIDE SERPL-SCNC: 98 MMOL/L (ref 98–112)
CO2 SERPL-SCNC: 30 MMOL/L (ref 21–32)
CREAT BLD-MCNC: 0.49 MG/DL
DEPRECATED RDW RBC AUTO: 48 FL (ref 35.1–46.3)
EOSINOPHIL # BLD AUTO: 0.06 X10(3) UL (ref 0–0.7)
EOSINOPHIL NFR BLD AUTO: 0.8 %
ERYTHROCYTE [DISTWIDTH] IN BLOOD BY AUTOMATED COUNT: 13.6 % (ref 11–15)
GLUCOSE BLD-MCNC: 104 MG/DL (ref 70–99)
HAV IGM SER QL: 2.1 MG/DL (ref 1.6–2.6)
HCT VFR BLD AUTO: 30.6 %
HGB BLD-MCNC: 10.4 G/DL
IMM GRANULOCYTES # BLD AUTO: 0.07 X10(3) UL (ref 0–1)
IMM GRANULOCYTES NFR BLD: 0.9 %
LYMPHOCYTES # BLD AUTO: 0.32 X10(3) UL (ref 1–4)
LYMPHOCYTES NFR BLD AUTO: 4.2 %
MCH RBC QN AUTO: 33.2 PG (ref 26–34)
MCHC RBC AUTO-ENTMCNC: 34 G/DL (ref 31–37)
MCV RBC AUTO: 97.8 FL
MONOCYTES # BLD AUTO: 0.83 X10(3) UL (ref 0.1–1)
MONOCYTES NFR BLD AUTO: 11 %
NEUTROPHILS # BLD AUTO: 6.27 X10 (3) UL (ref 1.5–7.7)
NEUTROPHILS # BLD AUTO: 6.27 X10(3) UL (ref 1.5–7.7)
NEUTROPHILS NFR BLD AUTO: 82.8 %
OSMOLALITY SERPL CALC.SUM OF ELEC: 272 MOSM/KG (ref 275–295)
PLATELET # BLD AUTO: 222 10(3)UL (ref 150–450)
POTASSIUM SERPL-SCNC: 4 MMOL/L (ref 3.5–5.1)
RBC # BLD AUTO: 3.13 X10(6)UL
SODIUM SERPL-SCNC: 132 MMOL/L (ref 136–145)
WBC # BLD AUTO: 7.6 X10(3) UL (ref 4–11)

## 2020-12-27 PROCEDURE — 85025 COMPLETE CBC W/AUTO DIFF WBC: CPT | Performed by: INTERNAL MEDICINE

## 2020-12-27 PROCEDURE — 83735 ASSAY OF MAGNESIUM: CPT | Performed by: INTERNAL MEDICINE

## 2020-12-27 PROCEDURE — 97116 GAIT TRAINING THERAPY: CPT

## 2020-12-27 PROCEDURE — 80048 BASIC METABOLIC PNL TOTAL CA: CPT | Performed by: INTERNAL MEDICINE

## 2020-12-27 PROCEDURE — 97530 THERAPEUTIC ACTIVITIES: CPT

## 2020-12-27 NOTE — PHYSICAL THERAPY NOTE
PHYSICAL THERAPY TREATMENT NOTE - INPATIENT     Room Number: 315/684-P       Presenting Problem: rectal cancer s/p colon resection and ileostomy    Problem List  Active Problems:    Rectal cancer ([facility] [address]    Preop testing      PHYSICAL THERAPY ASSESSMENT   Pt Good           Static Standing: Fair +  Dynamic Standing: Fair +    ACTIVITY TOLERANCE                         O2 WALK                  AM-PAC '6-Clicks' INPATIENT SHORT FORM - BASIC MOBILITY  How much difficulty does the patient currently have. ..  -   Norma Dove assistive device and supervision   Goal #4   Current Status NT   Goal #5 Patient to demonstrate independence with home activity/exercise instructions provided to patient in preparation for discharge.    Goal #5   Current Status IN PROGRESS   Goal #6    Goal

## 2020-12-27 NOTE — PROGRESS NOTES
[facility] Hospitalist Progress Note     PCP: Prashanth Rob DO    CC: Follow up       Assessment/Plan:   Ms. Adan Sanz is a 77 yo Female with PMHx of rectal CA, anxiety, HTN, who presented for LAR. Course complicated by N/V with imaging with SBO vs ileus.  Chatterjee 165 lb (74.8 kg)  12/15/20 1413 : 170 lb (77.1 kg)  11/12/20 1011 : 169 lb (76.7 kg)  11/04/20 1427 : 172 lb (78 kg)      Exam  Gen: No acute distress, alert and oriented x3, visually impaired   Pulm: Lungs clear, normal respiratory effort  CV: Heart with Small bilateral pleural effusions.     Dictated by (CST): Deepali Alfred MD on 12/24/2020 at 1:10 PM     Finalized by (CST): Deepali Alfred MD on 12/24/2020 at 1:14 PM          Xr Abdomen Obstructive Series Routine(2 Vw)(cpt=74019)    Result Date: 12/

## 2020-12-27 NOTE — PROGRESS NOTES
[facility]    Progress Note    Carina Braga Patient Status:  Inpatient    3/4/1945 MRN [id number]   Location [facility] 4W/SW/SE Attending Nany Rudolph MD   [facility] Day # 9 PCP West Sandoval DO        Subjective:    Carina Braga chloride 0.9% IV bolus 500 mL, 500 mL, Intravenous, Once    •  [COMPLETED] Magnesium Sulfate IVPB premix SOLN 2 g, 2 g, Intravenous, Once    •  Calcium Carbonate Antacid (TUMS) chewable tab 500 mg, 500 mg, Oral, BID PRN    •  [COMPLETED] acetaminophen (TYL Oral, resp. rate 18, height 4' 11.5\" (1.511 m), weight 165 lb (74.8 kg), SpO2 96 %, not currently breastfeeding. I/O last 3 completed shifts:   In: 0025 [P.O.:960; I.V.:1770]  Out: 5180 [Urine:1250; Drains:730; Stool:3200]    General: No acute distress 3.7 12/25/2020       No results found.

## 2020-12-27 NOTE — PLAN OF CARE
Patient alert and oriented, on room air. Patient confused and anxious at times, rest and reorientation given. 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 Patient states pain and lidocaine patch applied.  Patient was bathed in the AM. Tolerating full liquid diet Administer analgesics based on type and severity of pain and evaluate response  - Implement non-pharmacological measures as appropriate and evaluate response  - Consider cultural and social influences on pain and pain management  - Manage/alleviate anxiety Interventions:  - Assess wound bed/incision and surrounding skin tissue  - Collaborate with physician/APN and implement wound/incision site care and dressing changes as ordered  - Position infant to avoid placing pressure on wound  - Enterostomal/Wound the

## 2020-12-28 ENCOUNTER — APPOINTMENT (OUTPATIENT)
Dept: GENERAL RADIOLOGY | Facility: HOSPITAL | Age: 75
DRG: 330 | End: 2020-12-28
Attending: COLON & RECTAL SURGERY
Payer: MEDICARE

## 2020-12-28 LAB
ANION GAP SERPL CALC-SCNC: 2 MMOL/L (ref 0–18)
BUN BLD-MCNC: 8 MG/DL (ref 7–18)
BUN/CREAT SERPL: 16.3 (ref 10–20)
CALCIUM BLD-MCNC: 8.4 MG/DL (ref 8.5–10.1)
CHLORIDE SERPL-SCNC: 97 MMOL/L (ref 98–112)
CO2 SERPL-SCNC: 32 MMOL/L (ref 21–32)
CREAT BLD-MCNC: 0.49 MG/DL
GLUCOSE BLD-MCNC: 109 MG/DL (ref 70–99)
HAV IGM SER QL: 2.1 MG/DL (ref 1.6–2.6)
OSMOLALITY SERPL CALC.SUM OF ELEC: 271 MOSM/KG (ref 275–295)
PHOSPHATE SERPL-MCNC: 3.6 MG/DL (ref 2.5–4.9)
POTASSIUM SERPL-SCNC: 4.7 MMOL/L (ref 3.5–5.1)
SODIUM SERPL-SCNC: 131 MMOL/L (ref 136–145)

## 2020-12-28 PROCEDURE — 83735 ASSAY OF MAGNESIUM: CPT | Performed by: INTERNAL MEDICINE

## 2020-12-28 PROCEDURE — 80048 BASIC METABOLIC PNL TOTAL CA: CPT | Performed by: INTERNAL MEDICINE

## 2020-12-28 PROCEDURE — 74019 RADEX ABDOMEN 2 VIEWS: CPT | Performed by: COLON & RECTAL SURGERY

## 2020-12-28 PROCEDURE — 84100 ASSAY OF PHOSPHORUS: CPT | Performed by: INTERNAL MEDICINE

## 2020-12-28 NOTE — PAYOR COMM NOTE
--------------  CONTINUED STAY REVIEW    Payor: Stefani [address] #:  [id number]  Authorization Number: [id number]    Admit date: 12/18/20  Admit time: 4585    Admitting Physician: Erickson Ramirez MD  Attending Physician:  Jeevan Carr MD intermittently requiring straight cath with 450 out (last one evening of 12/23)  -started on flomax, encourage ambulation and monitor      Bacteruria, pseudomonas  -UA with no major signs of infection  -she does have retention however and had frequency eddie

## 2020-12-28 NOTE — PROGRESS NOTES
[facility]    Progress Note    Carina Braga Patient Status:  Inpatient    3/4/1945 MRN [id number]   Location [facility] 4W/SW/SE Attending Nany Rudolph MD   Hosp Day # 10 PCP West Sandoval DO        Subjective:    Carina Braga mg, 80 mg, Oral, QID PRN    •  sodium chloride 0.9% IV bolus 500 mL, 500 mL, Intravenous, Once    •  [COMPLETED] Magnesium Sulfate IVPB premix SOLN 2 g, 2 g, Intravenous, Once    •  Calcium Carbonate Antacid (TUMS) chewable tab 500 mg, 500 mg, Oral, BID AK °F (37 °C), temperature source Oral, resp. rate 18, height 4' 11.5\" (1.511 m), weight 171 lb (77.6 kg), SpO2 97 %, not currently breastfeeding. I/O last 3 completed shifts:   In: 2238 [P.O.:480; I.V.:1758]  Out: 8077 [Urine:1515; Drains:510; HHELP:0843  (H) 11/04/2020     (H) 11/04/2020    TSH 2.209 03/12/2020    MG 2.1 12/28/2020    PHOS 3.6 12/28/2020       No results found.

## 2020-12-28 NOTE — PROGRESS NOTES
[facility] Hospitalist Progress Note     PCP: León Freedman DO    CC: Follow up       Assessment/Plan:   Ms. Demetria Yousif is a 77 yo Female with PMHx of rectal CA, anxiety, HTN, who presented for LAR. Course complicated by N/V with imaging with SBO vs ileus.  Chatterjee 3 Weights  12/27/20 1700 : 171 lb (77.6 kg)  12/18/20 0615 : 165 lb (74.8 kg)  12/15/20 1413 : 170 lb (77.1 kg)  11/12/20 1011 : 169 lb (76.7 kg)  11/04/20 1427 : 172 lb (78 kg)      Exam  Gen: No acute distress, alert and oriented x3, visually impaired 12/24/2020  CONCLUSION:   No evidence of bowel obstruction. Small bilateral pleural effusions.     Dictated by (CST): Caldwell Cushing, MD on 12/24/2020 at 1:10 PM     Finalized by (CST): Caldwell Cushing, MD on 12/24/2020 at 1:14 PM          Xr Abdomen Ob

## 2020-12-28 NOTE — PAYOR COMM NOTE
--------------  CONTINUED STAY REVIEW    Payor: [address] [place] [address] #:  [id number]  Authorization Number: [id number]    Admit date: 12/18/20  Admit time: 8121    Admitting Physician: Dennis Landaverde MD  Attending Physician:  Little Gilford, MD Positive bowel sounds. No rebound tenderness  Stoma: viable, edematous, brown liquid output and flatus  Incision(s): C/D/I x 4  Drain: serosang  Neurologic: No focal neurological deficits. Musculoskeletal: Full range of motion of all extremities.   TREY Hernandes

## 2020-12-28 NOTE — PAYOR COMM NOTE
--------------  CONTINUED STAY REVIEW    Payor: [address] #:  [id number]  Authorization Number: [id number]    Admit date: 12/18/20  Admit time: 4818    Admitting Physician: Denny Hernandez MD  Attending Physician:  Watson Johnston MD liquid output and flatus  Incision(s): C/D/I x 4  Drain: serosang  Neurologic: No focal neurological deficits. Musculoskeletal: Full range of motion of all extremities. BL LE swelling noted, no redness/ no tenderness. Integument: No lesions.  No erythema 5,000 Units Subcutaneous (Right Upper Arm) Ada Mcintyre RN      HYDROmorphone HCl (DILAUDID) 1 MG/ML injection 0.2 mg     Date Action Dose Route User    12/27/2020 2143 Given 0.2 mg Intravenous Mable Carballo RN      lactated ringers infusion     Lei

## 2020-12-28 NOTE — PLAN OF CARE
Pt A/O x4, can be forgetful & anxious at times. Ilad Regkyler. Tolerating soft diet/ERAS protocol. Tramadol PRN for pain. Simethicone PRN. PATRICIA drain to bulb suction. Colostomy in place & draining. Surgical sites C/D/I. Voiding.  Ambulating with assist. Fall p distraction and/or relaxation techniques  - Monitor for opioid side effects  - Notify MD/LIP if interventions unsuccessful or patient reports new pain  - Anticipate increased pain with activity and pre-medicate as appropriate  Outcome: Progressing     Prob for ostomies/wounds/G-tubes  Outcome: Progressing     Problem: GASTROINTESTINAL - ADULT  Goal: Minimal or absence of nausea and vomiting  Description: INTERVENTIONS:  - Maintain adequate hydration with IV or PO as ordered and tolerated  - Nasogastric tube

## 2020-12-28 NOTE — PHYSICAL THERAPY NOTE
Attempted physical therapy treatment. Patient and family member presented sitting at bedside. Patient declined physical therapy stating she was waiting for wound care to arrive and educate family member on changing colostomy.  Will defer physical therapy at

## 2020-12-28 NOTE — SPIRITUAL CARE NOTE
Pt was accompanied by her brother in law. She said she was getting better, but appeared a little worried for her sister who was her care taker and not feeling well, also worried to be a burden for them.   encourageed pt to live in the present moment

## 2020-12-29 LAB
ANION GAP SERPL CALC-SCNC: 4 MMOL/L (ref 0–18)
ANION GAP SERPL CALC-SCNC: 5 MMOL/L (ref 0–18)
BUN BLD-MCNC: 9 MG/DL (ref 7–18)
BUN BLD-MCNC: 9 MG/DL (ref 7–18)
BUN/CREAT SERPL: 16.7 (ref 10–20)
BUN/CREAT SERPL: 19.6 (ref 10–20)
CALCIUM BLD-MCNC: 8.4 MG/DL (ref 8.5–10.1)
CALCIUM BLD-MCNC: 8.4 MG/DL (ref 8.5–10.1)
CHLORIDE SERPL-SCNC: 96 MMOL/L (ref 98–112)
CHLORIDE SERPL-SCNC: 99 MMOL/L (ref 98–112)
CO2 SERPL-SCNC: 28 MMOL/L (ref 21–32)
CO2 SERPL-SCNC: 28 MMOL/L (ref 21–32)
CREAT BLD-MCNC: 0.46 MG/DL
CREAT BLD-MCNC: 0.54 MG/DL
GLUCOSE BLD-MCNC: 111 MG/DL (ref 70–99)
GLUCOSE BLD-MCNC: 132 MG/DL (ref 70–99)
HAV IGM SER QL: 2.1 MG/DL (ref 1.6–2.6)
OSMOLALITY SERPL CALC.SUM OF ELEC: 267 MOSM/KG (ref 275–295)
OSMOLALITY SERPL CALC.SUM OF ELEC: 273 MOSM/KG (ref 275–295)
PHOSPHATE SERPL-MCNC: 3.5 MG/DL (ref 2.5–4.9)
POTASSIUM SERPL-SCNC: 3.6 MMOL/L (ref 3.5–5.1)
POTASSIUM SERPL-SCNC: 4.5 MMOL/L (ref 3.5–5.1)
SODIUM SERPL-SCNC: 129 MMOL/L (ref 136–145)
SODIUM SERPL-SCNC: 131 MMOL/L (ref 136–145)

## 2020-12-29 PROCEDURE — 99211 OFF/OP EST MAY X REQ PHY/QHP: CPT

## 2020-12-29 PROCEDURE — 83735 ASSAY OF MAGNESIUM: CPT | Performed by: INTERNAL MEDICINE

## 2020-12-29 PROCEDURE — 80048 BASIC METABOLIC PNL TOTAL CA: CPT | Performed by: INTERNAL MEDICINE

## 2020-12-29 PROCEDURE — 84100 ASSAY OF PHOSPHORUS: CPT | Performed by: INTERNAL MEDICINE

## 2020-12-29 PROCEDURE — 99213 OFFICE O/P EST LOW 20 MIN: CPT

## 2020-12-29 RX ORDER — SODIUM CHLORIDE 9 MG/ML
INJECTION, SOLUTION INTRAVENOUS CONTINUOUS
Status: DISCONTINUED | OUTPATIENT
Start: 2020-12-29 | End: 2021-01-03

## 2020-12-29 NOTE — PROGRESS NOTES
[facility]    Progress Note    Vicci Ranks Patient Status:  Inpatient    3/4/1945 MRN [id number]   Location [facility] 4W/SW/SE Attending Roseanne Palenica MD   [facility] Day # 11 PCP Paco Gibson DO        Subjective:    Vicjules Ranks Once    •  simethicone (MYLICON) chewable tab 80 mg, 80 mg, Oral, QID PRN    •  sodium chloride 0.9% IV bolus 500 mL, 500 mL, Intravenous, Once    •  [COMPLETED] Magnesium Sulfate IVPB premix SOLN 2 g, 2 g, Intravenous, Once    •  Calcium Carbonate Antacid Signs: Blood pressure 140/69, pulse 79, temperature 98.9 °F (37.2 °C), temperature source Oral, resp. rate 18, height 4' 11.5\" (1.511 m), weight 171 lb (77.6 kg), SpO2 95 %, not currently breastfeeding. I/O last 3 completed shifts:   In: 2058 [P.O.:35 11/04/2020    ALKPHO 114 11/04/2020    BILT 0.65 11/04/2020    TP 7.9 11/04/2020     (H) 11/04/2020     (H) 11/04/2020    TSH 2.209 03/12/2020    MG 2.1 12/29/2020    PHOS 3.5 12/29/2020       Xr Abdomen Obstructive Series Routine(2 Vw)(cpt=7

## 2020-12-29 NOTE — DIETARY NOTE
ADULT NUTRITION REASSESSMENT     Pt is at moderate nutrition risk. Pt does not meet malnutrition criteria.       RECOMMENDATIONS TO MD:  See Nutrition Intervention     NUTRITION DIAGNOSIS/PROBLEM:  Inadequate oral intake related to alteration in gastroin providers    - Discharge and transfer of nutrition care to new setting or provider: monitor plans    ADMITTING DIAGNOSIS:   Rectal cancer ([facility] [address] [C20]  Rectal cancer ([facility] [address]    [address]   has a past medical history of Anxiety state, Back problem, Q8H [facility]       LABS: reviewed  Recent Labs     12/27/20  0708 12/28/20  0605 12/29/20  0719   * 109* 111*   BUN 7 8 9   CREATSERUM 0.49* 0.49* 0.54*   CA 8.4* 8.4* 8.4*   MG 2.1 2.1 2.1   * 131* 129*   K 4.0 4.7 3.6   CL 98 97* 96*   CO2 30.0 3

## 2020-12-29 NOTE — WOUND PROGRESS NOTE
Wound and Ostomy Care Services  Follow up on the pt. to continue ileostomy care teaching, per the pt. her sister may or may not be in today.  I asked the pt. to empty her pouch and she did a great job, she just needs to know that the end of the pouch is in

## 2020-12-29 NOTE — PAYOR COMM NOTE
--------------  CONTINUED STAY REVIEW    Payor: [address] [place] [address] #:  [id number]  Authorization Number: [id number]    Admit date: 12/18/20  Admit time: 0277    Admitting Physician: Rian Curtis MD  Attending Physician:  Dona Jose MD Dose Route User    12/29/2020 2775 Given 5,000 Units Subcutaneous (Right Upper Arm) Sylvia Kussmaul, RN    12/28/2020 2115 Given 5,000 Units Subcutaneous (Right Upper Arm) Sylvia Kussmaul, RN    12/28/2020 1502 Given 5,000 Units Subcutaneous (Left Upper Ar

## 2020-12-29 NOTE — WOUND PROGRESS NOTE
Wound and Ostomy Care Services  Follow up to continue teaching the pt. ileostomy care, her family is not able to make it today. The pt. emptied her own pouch with minimal assist as she is blind. 400 cc of brown mushy and liquid stool.  The pt. does not orestes

## 2020-12-29 NOTE — PROGRESS NOTES
[facility] Hospitalist Progress Note     PCP: Alia Pacekr DO    CC: Follow up       Assessment/Plan:   Ms. Jeana Swann is a 75 yo Female with PMHx of rectal CA, anxiety, HTN, who presented for LAR. Course complicated by N/V with imaging with SBO vs ileus.  Chatterjee data filed at 12/29/2020 0753  Gross per 24 hour   Intake 1458 ml   Output 2920 ml   Net -1462 ml       Last 3 Weights  12/27/20 1700 : 171 lb (77.6 kg)  12/18/20 0615 : 165 lb (74.8 kg)  12/15/20 1413 : 170 lb (77.1 kg)  11/12/20 1011 : 169 lb (76.7 kg) 1.8 2.1 2.1 2.1 2.1   PHOS 2.9 2.9 3.7  --   --  3.6 3.5   * 115* 101*  --  104* 109* 111*         No results for input(s): PGLU in the last 168 hours.   Imaging:Xr Abdomen Obstructive Series Routine(2 Vw)(cpt=74019)    Result Date: 12/28/2020  CONCL

## 2020-12-29 NOTE — PLAN OF CARE
Patient is alert and oriented. Ambulating minimal assist with a walker. VSS. Incision sites clean, dry, and intact. PATRICIA dressing changed due to dressing being soiled- split gauze and paper tape. Ileostomy draining with good output.  PATRICIA draining with serous d measures as appropriate and evaluate response  - Consider cultural and social influences on pain and pain management  - Manage/alleviate anxiety  - Utilize distraction and/or relaxation techniques  - Monitor for opioid side effects  - Notify MD/LIP if inte wound/incision site care and dressing changes as ordered  - Position infant to avoid placing pressure on wound  - Enterostomal/Wound therapy consult as indicated for ostomies/wounds/G-tubes  Outcome: Progressing     Problem: GASTROINTESTINAL - ADULT  Goal:

## 2020-12-29 NOTE — PLAN OF CARE
Pt A/O x4, can be forgetful & anxious at times. Hyponatremic, IVF changed to NS. Schd Reglan. Tolerating soft diet/ERAS protocol. K+ replaced per protocol. Tramadol PRN for pain. PATRICIA drain to bulb suction.  Colostomy in place & draining, pt emptying own pouc influences on pain and pain management  - Manage/alleviate anxiety  - Utilize distraction and/or relaxation techniques  - Monitor for opioid side effects  - Notify MD/LIP if interventions unsuccessful or patient reports new pain  - Anticipate increased osman avoid placing pressure on wound  - Enterostomal/Wound therapy consult as indicated for ostomies/wounds/G-tubes  Outcome: Progressing     Problem: GASTROINTESTINAL - ADULT  Goal: Minimal or absence of nausea and vomiting  Description: INTERVENTIONS:  - Main

## 2020-12-30 LAB
ANION GAP SERPL CALC-SCNC: 4 MMOL/L (ref 0–18)
BASOPHILS # BLD AUTO: 0.03 X10(3) UL (ref 0–0.2)
BASOPHILS NFR BLD AUTO: 0.4 %
BUN BLD-MCNC: 9 MG/DL (ref 7–18)
BUN/CREAT SERPL: 17.3 (ref 10–20)
CALCIUM BLD-MCNC: 8.5 MG/DL (ref 8.5–10.1)
CHLORIDE SERPL-SCNC: 99 MMOL/L (ref 98–112)
CO2 SERPL-SCNC: 28 MMOL/L (ref 21–32)
CREAT BLD-MCNC: 0.52 MG/DL
DEPRECATED RDW RBC AUTO: 48.2 FL (ref 35.1–46.3)
EOSINOPHIL # BLD AUTO: 0.08 X10(3) UL (ref 0–0.7)
EOSINOPHIL NFR BLD AUTO: 1.1 %
ERYTHROCYTE [DISTWIDTH] IN BLOOD BY AUTOMATED COUNT: 13.5 % (ref 11–15)
GLUCOSE BLD-MCNC: 105 MG/DL (ref 70–99)
HCT VFR BLD AUTO: 31.3 %
HGB BLD-MCNC: 10.2 G/DL
IMM GRANULOCYTES # BLD AUTO: 0.05 X10(3) UL (ref 0–1)
IMM GRANULOCYTES NFR BLD: 0.7 %
LYMPHOCYTES # BLD AUTO: 0.21 X10(3) UL (ref 1–4)
LYMPHOCYTES NFR BLD AUTO: 2.8 %
MCH RBC QN AUTO: 31.9 PG (ref 26–34)
MCHC RBC AUTO-ENTMCNC: 32.6 G/DL (ref 31–37)
MCV RBC AUTO: 97.8 FL
MONOCYTES # BLD AUTO: 0.65 X10(3) UL (ref 0.1–1)
MONOCYTES NFR BLD AUTO: 8.8 %
NEUTROPHILS # BLD AUTO: 6.38 X10 (3) UL (ref 1.5–7.7)
NEUTROPHILS # BLD AUTO: 6.38 X10(3) UL (ref 1.5–7.7)
NEUTROPHILS NFR BLD AUTO: 86.2 %
OSMOLALITY SERPL CALC.SUM OF ELEC: 271 MOSM/KG (ref 275–295)
PLATELET # BLD AUTO: 289 10(3)UL (ref 150–450)
POTASSIUM SERPL-SCNC: 4.3 MMOL/L (ref 3.5–5.1)
RBC # BLD AUTO: 3.2 X10(6)UL
SODIUM SERPL-SCNC: 131 MMOL/L (ref 136–145)
WBC # BLD AUTO: 7.4 X10(3) UL (ref 4–11)

## 2020-12-30 PROCEDURE — 85025 COMPLETE CBC W/AUTO DIFF WBC: CPT | Performed by: INTERNAL MEDICINE

## 2020-12-30 PROCEDURE — 97535 SELF CARE MNGMENT TRAINING: CPT

## 2020-12-30 PROCEDURE — 99211 OFF/OP EST MAY X REQ PHY/QHP: CPT

## 2020-12-30 PROCEDURE — 80048 BASIC METABOLIC PNL TOTAL CA: CPT | Performed by: INTERNAL MEDICINE

## 2020-12-30 NOTE — OCCUPATIONAL THERAPY NOTE
OCCUPATIONAL THERAPY TREATMENT NOTE - INPATIENT        Room Number: 063/554-F           Presenting Problem: (s/p colon resection & ileostomy due to rectal CA )    Problem List  Active Problems:    Rectal cancer ([address]    Preop testing      OCCUPATIONAL THER LIVING ASSESSMENT  AM-PAC ‘6-Clicks’ Inpatient Daily Activity Short Form  How much help from another person does the patient currently need…  -   Putting on and taking off regular lower body clothing?: A Little  -   Bathing (including washing, rinsing, dry

## 2020-12-30 NOTE — PAYOR COMM NOTE
--------------  CONTINUED STAY REVIEW    Payor: [address] [place] [address] #:  [id number]  Authorization Number: [id number]    Admit date: 12/18/20  Admit time: 7783    Admitting Physician: Sara Saravia MD  Attending Physician:  Mayra Bazan MD (Porcine) 5000 UNIT/ML injection 5,000 Units     Date Action Dose Route User    12/30/2020 0535 Given 5,000 Units Subcutaneous (Left Upper Arm) Jeremie Harris RN    12/29/2020 2106 Given 5,000 Units Subcutaneous (Right Upper Arm) Jeremie Harris RN    1

## 2020-12-30 NOTE — WOUND PROGRESS NOTE
Pt was seen sitting in the chair, agreeable to perform ileostomy bag emptying. The pt was able to perform opening, emptying, cleansing and closing the appliance. There was 100 ml watery brown/green stool in the bag.  All questions answered, pt is confident

## 2020-12-30 NOTE — PLAN OF CARE
Patient is alert and oriented. Ileostomy and PATRICIA draining. Given tramadol at 11:52 pm. Patient participating in ileostomy care. Ambulating one assist with a walker. Voiding appropriately. Patient agreeable to drinking glucerna through the night.  Call light Monitor for opioid side effects  - Notify MD/LIP if interventions unsuccessful or patient reports new pain  - Anticipate increased pain with activity and pre-medicate as appropriate  Outcome: Progressing     Problem: RISK FOR INFECTION - ADULT  Goal: Absen Progressing     Problem: GASTROINTESTINAL - ADULT  Goal: Minimal or absence of nausea and vomiting  Description: INTERVENTIONS:  - Maintain adequate hydration with IV or PO as ordered and tolerated  - Nasogastric tube to low intermittent suction as ordered

## 2020-12-30 NOTE — PROGRESS NOTES
[facility] Hospitalist Progress Note     PCP: Dara Grandchild, DO    CC: Follow up       Assessment/Plan:   Ms. Kp Briseno is a 75 yo Female with PMHx of rectal CA, anxiety, HTN, who presented for LAR. Course complicated by N/V with imaging with SBO vs ileus.  Chatterjee ml   Net -837 ml       Last 3 Weights  12/27/20 1700 : 171 lb (77.6 kg)  12/18/20 0615 : 165 lb (74.8 kg)  12/15/20 1413 : 170 lb (77.1 kg)  11/12/20 1011 : 169 lb (76.7 kg)  11/04/20 1427 : 172 lb (78 kg)      Exam  Gen: No acute distress, alert and orien in the last 168 hours. Imaging:Xr Abdomen Obstructive Series Routine(2 Vw)(cpt=74019)    Result Date: 12/28/2020  CONCLUSION:  1. No bowel obstruction. There is less air noted throughout large and small bowel when compared to previous study.   Moderate fl

## 2020-12-31 ENCOUNTER — APPOINTMENT (OUTPATIENT)
Dept: CT IMAGING | Facility: HOSPITAL | Age: 75
DRG: 330 | End: 2020-12-31
Attending: COLON & RECTAL SURGERY
Payer: MEDICARE

## 2020-12-31 LAB
ANION GAP SERPL CALC-SCNC: 3 MMOL/L (ref 0–18)
BUN BLD-MCNC: 7 MG/DL (ref 7–18)
BUN/CREAT SERPL: 17.1 (ref 10–20)
CALCIUM BLD-MCNC: 8.3 MG/DL (ref 8.5–10.1)
CHLORIDE SERPL-SCNC: 102 MMOL/L (ref 98–112)
CO2 SERPL-SCNC: 28 MMOL/L (ref 21–32)
CREAT BLD-MCNC: 0.41 MG/DL
GLUCOSE BLD-MCNC: 101 MG/DL (ref 70–99)
OSMOLALITY SERPL CALC.SUM OF ELEC: 274 MOSM/KG (ref 275–295)
POTASSIUM SERPL-SCNC: 3.9 MMOL/L (ref 3.5–5.1)
SODIUM SERPL-SCNC: 133 MMOL/L (ref 136–145)

## 2020-12-31 PROCEDURE — 97110 THERAPEUTIC EXERCISES: CPT

## 2020-12-31 PROCEDURE — 80048 BASIC METABOLIC PNL TOTAL CA: CPT | Performed by: INTERNAL MEDICINE

## 2020-12-31 PROCEDURE — 97116 GAIT TRAINING THERAPY: CPT

## 2020-12-31 PROCEDURE — 74177 CT ABD & PELVIS W/CONTRAST: CPT | Performed by: COLON & RECTAL SURGERY

## 2020-12-31 NOTE — PROGRESS NOTES
[facility]    Progress Note    Claudio Coto Patient Status:  Inpatient    3/4/1945 MRN [id number]   Location [facility] 4W/SW/SE Attending Agnes Hester MD   [facility] Day # 15 PCP Reynaldo Norman DO        Subjective:    Claudio Coto mg, Oral, QID PRN    •  sodium chloride 0.9% IV bolus 500 mL, 500 mL, Intravenous, Once    •  [COMPLETED] Magnesium Sulfate IVPB premix SOLN 2 g, 2 g, Intravenous, Once    •  Calcium Carbonate Antacid (TUMS) chewable tab 500 mg, 500 mg, Oral, BID PRN    • (36.9 °C), temperature source Oral, resp. rate 16, height 4' 11.5\" (1.511 m), weight 171 lb (77.6 kg), SpO2 96 %, not currently breastfeeding. I/O last 3 completed shifts: In: 1215.5 [P.O.:238;  I.V.:977.5]  Out: 2700 [Urine:150; Drains:350; Stool:220 CO2 28.0 12/31/2020     (H) 12/31/2020    CA 8.3 (L) 12/31/2020    ALB 3.6 11/04/2020    ALKPHO 114 11/04/2020    BILT 0.65 11/04/2020    TP 7.9 11/04/2020     (H) 11/04/2020     (H) 11/04/2020    TSH 2.209 03/12/2020    MG 2.1 12/29/2

## 2020-12-31 NOTE — PLAN OF CARE
Problem: Patient Centered Care  Goal: Patient preferences are identified and integrated in the patient's plan of care  Description: Interventions:  - What would you like us to know as we care for you?  I live at home with my sister  - Provide timely, comp as needed  - Minimize the use of adhesives  Outcome: Progressing  Goal: Incision/wound heals without complications  Description: Interventions:  - Assess wound bed/incision and surrounding skin tissue  - Collaborate with physician/APN and implement wound/i Nasogastric tube to low intermittent suction as ordered  - Evaluate effectiveness of ordered antiemetic medications  - Provide nonpharmacologic comfort measures as appropriate  - Advance diet as tolerated, if ordered  - Obtain nutritional consult as needed

## 2020-12-31 NOTE — PAYOR COMM NOTE
--------------  CONTINUED STAY REVIEW    Payor: [address] [place] [address] #:  [id number]  Authorization Number: [id number]    Admit date: 12/18/20  Admit time: 3532    Admitting Physician: Pauline Curtis MD  Attending Physician:  Roseanne Palencia MD rate and rhythm. Abdomen: Soft, nontender, minimally distended. Positive bowel sounds.  No rebound tenderness  Stoma: viable, more edematous, green liquid with particulate output and significant flatus  Incision(s): C/D/I x 4  Drain: serosang  Neurologi mg     Date Action Dose Route User    12/31/2020 5539 Given 10 mg Intravenous Kevan Hewitt RN    12/31/2020 0316 Given 10 mg Intravenous Jeremy Sood RN    12/30/2020 2130 Given 10 mg Intravenous Jeremy Sood RN    12/30/2020 1418 Given 10 mg In

## 2020-12-31 NOTE — PROGRESS NOTES
[facility]    Progress Note    Derrell Stallings Patient Status:  Inpatient    3/4/1945 MRN [id number]   Location [facility] 4W/SW/SE Attending Joann Shukla MD   Hosp Day # 12 PCP Adrian Darden DO        Subjective:    Derrell Stallings Intravenous, Once    •  [COMPLETED] Magnesium Sulfate IVPB premix SOLN 2 g, 2 g, Intravenous, Once    •  Calcium Carbonate Antacid (TUMS) chewable tab 500 mg, 500 mg, Oral, BID PRN    •  [COMPLETED] acetaminophen (TYLENOL EXTRA STRENGTH) tab 1,000 mg, 1,00 m), weight 171 lb (77.6 kg), SpO2 97 %, not currently breastfeeding. I/O last 3 completed shifts: In: 3086 [P.O.:238; I.V.:1050]  Out: 3060 [Urine:690; Drains:345; Stool:2025]    General: No acute distress. Alert and oriented x 3.   HEENT: Moist mucous  (H) 11/04/2020    TSH 2.209 03/12/2020    MG 2.1 12/29/2020    PHOS 3.5 12/29/2020       No results found.

## 2020-12-31 NOTE — PLAN OF CARE
Patient is alert and oriented x4. Pt anxious at times. Pt upx1 walker. Pt on GI soft diet, small appetite, but tolerating well. ERAS protocol. RLQ ileostomy + stool and gas. Lap sites x4 dermabond, clean, dry, and intact.  Pt ambulating in room and worked w Consider cultural and social influences on pain and pain management  - Manage/alleviate anxiety  - Utilize distraction and/or relaxation techniques  - Monitor for opioid side effects  - Notify MD/LIP if interventions unsuccessful or patient reports new osman ordered  - Position infant to avoid placing pressure on wound  - Enterostomal/Wound therapy consult as indicated for ostomies/wounds/G-tubes  Outcome: Progressing     Problem: GASTROINTESTINAL - ADULT  Goal: Minimal or absence of nausea and vomiting  Descr

## 2020-12-31 NOTE — PHYSICAL THERAPY NOTE
PHYSICAL THERAPY TREATMENT NOTE - INPATIENT     Room Number: 929/288-R       Presenting Problem: rectal cancer s/p colon resection and ileostomy    Problem List  Active Problems:    Rectal cancer ([facility] [address]    Preop testing      PHYSICAL THERAPY ASSESSMENT   RN Relaxation;Repositioning    BALANCE                                                                                                                     Static Sitting: Good  Dynamic Sitting: Good           Static Standing: Fair -  Dynamic Standing: Fair - Status SBA with the RW   Goal #3 Patient is able to ambulate 200 feet with assist device: walker - rolling at assistance level: supervision   Goal #3   Current Status 150' and 48' with the RW SBA   Goal #4 Patient will negotiate 2 stairs/one curb w/ assist

## 2020-12-31 NOTE — PROGRESS NOTES
[facility] Hospitalist Progress Note     PCP: Rupesh Sarah DO    CC: Follow up       Assessment/Plan:   Ms. Hetal Flores is a 75 yo Female with PMHx of rectal CA, anxiety, HTN, who presented for LAR. Course complicated by N/V with imaging with SBO vs ileus.  Chatterjee at 12/31/2020 1134  Gross per 24 hour   Intake 977.5 ml   Output 1990 ml   Net -1012.5 ml       Last 3 Weights  12/27/20 1700 : 171 lb (77.6 kg)  12/18/20 0615 : 165 lb (74.8 kg)  12/15/20 1413 : 170 lb (77.1 kg)  11/12/20 1011 : 169 lb (76.7 kg)  11/04/20 No results for input(s): PGLU in the last 168 hours. Imaging:Xr Abdomen Obstructive Series Routine(2 Vw)(cpt=74019)    Result Date: 12/28/2020  CONCLUSION:  1. No bowel obstruction.   There is less air noted throughout large and small bowel when co

## 2021-01-01 ENCOUNTER — APPOINTMENT (OUTPATIENT)
Dept: GENERAL RADIOLOGY | Facility: HOSPITAL | Age: 76
DRG: 330 | End: 2021-01-01
Attending: COLON & RECTAL SURGERY
Payer: MEDICARE

## 2021-01-01 LAB
ANION GAP SERPL CALC-SCNC: 3 MMOL/L (ref 0–18)
ANION GAP SERPL CALC-SCNC: 5 MMOL/L (ref 0–18)
BASOPHILS # BLD AUTO: 0.02 X10(3) UL (ref 0–0.2)
BASOPHILS NFR BLD AUTO: 0.3 %
BUN BLD-MCNC: 6 MG/DL (ref 7–18)
BUN BLD-MCNC: 8 MG/DL (ref 7–18)
BUN/CREAT SERPL: 12 (ref 10–20)
BUN/CREAT SERPL: 15.4 (ref 10–20)
CALCIUM BLD-MCNC: 8.8 MG/DL (ref 8.5–10.1)
CALCIUM BLD-MCNC: 9 MG/DL (ref 8.5–10.1)
CHLORIDE SERPL-SCNC: 97 MMOL/L (ref 98–112)
CHLORIDE SERPL-SCNC: 98 MMOL/L (ref 98–112)
CO2 SERPL-SCNC: 27 MMOL/L (ref 21–32)
CO2 SERPL-SCNC: 29 MMOL/L (ref 21–32)
CREAT BLD-MCNC: 0.5 MG/DL
CREAT BLD-MCNC: 0.52 MG/DL
DEPRECATED RDW RBC AUTO: 47.3 FL (ref 35.1–46.3)
EOSINOPHIL # BLD AUTO: 0.04 X10(3) UL (ref 0–0.7)
EOSINOPHIL NFR BLD AUTO: 0.6 %
ERYTHROCYTE [DISTWIDTH] IN BLOOD BY AUTOMATED COUNT: 13.3 % (ref 11–15)
GLUCOSE BLD-MCNC: 109 MG/DL (ref 70–99)
GLUCOSE BLD-MCNC: 117 MG/DL (ref 70–99)
HCT VFR BLD AUTO: 31.4 %
HGB BLD-MCNC: 10.3 G/DL
IMM GRANULOCYTES # BLD AUTO: 0.05 X10(3) UL (ref 0–1)
IMM GRANULOCYTES NFR BLD: 0.7 %
LYMPHOCYTES # BLD AUTO: 0.16 X10(3) UL (ref 1–4)
LYMPHOCYTES NFR BLD AUTO: 2.3 %
MCH RBC QN AUTO: 31.8 PG (ref 26–34)
MCHC RBC AUTO-ENTMCNC: 32.8 G/DL (ref 31–37)
MCV RBC AUTO: 96.9 FL
MONOCYTES # BLD AUTO: 0.68 X10(3) UL (ref 0.1–1)
MONOCYTES NFR BLD AUTO: 10 %
NEUTROPHILS # BLD AUTO: 5.87 X10 (3) UL (ref 1.5–7.7)
NEUTROPHILS # BLD AUTO: 5.87 X10(3) UL (ref 1.5–7.7)
NEUTROPHILS NFR BLD AUTO: 86.1 %
OSMOLALITY SERPL CALC.SUM OF ELEC: 267 MOSM/KG (ref 275–295)
OSMOLALITY SERPL CALC.SUM OF ELEC: 268 MOSM/KG (ref 275–295)
PLATELET # BLD AUTO: 319 10(3)UL (ref 150–450)
POTASSIUM SERPL-SCNC: 3.8 MMOL/L (ref 3.5–5.1)
POTASSIUM SERPL-SCNC: 4.5 MMOL/L (ref 3.5–5.1)
RBC # BLD AUTO: 3.24 X10(6)UL
SODIUM SERPL-SCNC: 129 MMOL/L (ref 136–145)
SODIUM SERPL-SCNC: 130 MMOL/L (ref 136–145)
WBC # BLD AUTO: 6.8 X10(3) UL (ref 4–11)

## 2021-01-01 PROCEDURE — 85025 COMPLETE CBC W/AUTO DIFF WBC: CPT | Performed by: SURGERY

## 2021-01-01 PROCEDURE — 80048 BASIC METABOLIC PNL TOTAL CA: CPT | Performed by: SURGERY

## 2021-01-01 PROCEDURE — 74240 X-RAY XM UPR GI TRC 1CNTRST: CPT | Performed by: COLON & RECTAL SURGERY

## 2021-01-01 PROCEDURE — 80048 BASIC METABOLIC PNL TOTAL CA: CPT | Performed by: INTERNAL MEDICINE

## 2021-01-01 NOTE — PROGRESS NOTES
[facility] Hospitalist Progress Note     PCP: Norma Cavazos DO    CC: Follow up       Assessment/Plan:   Ms. Lova Skiff is a 77 yo Female with PMHx of rectal CA, anxiety, HTN, who presented for LAR. Course complicated by N/V with imaging with SBO vs ileus.  Chatterjee °F (37 °C)-98.7 °F (37.1 °C)] 98.7 °F (37.1 °C)  Pulse:  [81-97] 81  Resp:  [18] 18  BP: (129-149)/(53-71) 149/60    Intake/Output:    Intake/Output Summary (Last 24 hours) at 1/1/2021 1138  Last data filed at 1/1/2021 1111  Gross per 24 hour   Intake 480 < > 0.49* 0.49* 0.54* 0.46* 0.52* 0.41* 0.52*   CA  --    < > 8.4* 8.4* 8.4* 8.4* 8.5 8.3* 9.0   MG 2.1  --  2.1 2.1 2.1  --   --   --   --    PHOS  --   --   --  3.6 3.5  --   --   --   --    GLU  --    < > 104* 109* 111* 132* 105* 101* 117*    < > = valu abscess is seen. 4. Results were discussed with Dr. Randy Hill who is the physician on-call.     Dictated by (CST): Jory Woodward MD on 12/31/2020 at 7:14 PM     Finalized by (CST): Jory Woodward MD on 12/31/2020 at 7:27 PM          Xr Abdomen Obst

## 2021-01-01 NOTE — PROGRESS NOTES
[facility]    Progress Note    Carmenza Musa Patient Status:  Inpatient    3/4/1945 MRN [id number]   Location [facility] 4W/SW/SE Attending Jennifer Aquino MD   [address] Day # 14 PCP Ximena Rivera DO        Subjective:    Carmenza Musa potassium chloride IVPB premix 20 mEq, 20 mEq, Intravenous, Once    •  simethicone (MYLICON) chewable tab 80 mg, 80 mg, Oral, QID PRN    •  sodium chloride 0.9% IV bolus 500 mL, 500 mL, Intravenous, Once    •  [COMPLETED] Magnesium Sulfate IVPB premix SOLN mg, Intravenous, Q8H            Objective:   Vital Signs:  Blood pressure 149/60, pulse 81, temperature 98.7 °F (37.1 °C), temperature source Oral, resp. rate 18, height 4' 11.5\" (1.511 m), weight 171 lb (77.6 kg), SpO2 96 %, not currently breastfeeding. antibiotics     Zosyn started  Continue Reglan q6h ATC rather than prn  Continue IVF for now, monitor fluid status     If prolonged bowel rest needed she may need TPN started      Continue Flomax and monitoring UOP    Marcial Torres MD             Results: diverting ileostomy. Decompressed colon. Apparent left hemicolectomy.  3. Contracted gallbladder with enhancement of the wall which may be related to the adjacent inflammatory reaction about the distal stomach and duodenum as discussed above, but I can no

## 2021-01-01 NOTE — H&P
[facility]  Preop H&P - Colon and Rectal Surgery  Gene Dumont MD    CHIEF COMPLAINT:  Rectal cancer     HISTORY OF PRESENT ILLNESS:  Ling Mcfarland is a 76year old female who presents for surgery for rectal cancer.       INTERVAL HISTORY  She compl COLONOSCOPY  06/2020   • ENDOSCOPIC ULTRASOUND (EUS) N/A 7/13/2020    Performed by Preet Russell MD at [address] [place] [address] 7/13/2020    Performed by Preet Russell MD at [facility] ENDOSCOPY   • OTHER  11/2020    exploration of rectal obvious abnormality, atraumatic  NECK:  Supple, symmetrical, trachea midline, no adenopathy, thyroid symmetric, not enlarged and no tenderness  HEMATOLOGIC/LYMPHATICS:  No cervical lymphadenopathy, no supraclavicular lymphadenopathy, no inguinal lymphadeno    B- Rectal mass biopsy:   -POSITIVE for invasive adenocarcinoma. -(See comment). Rectal EUS 7/13/20 - Dr. Jesenia Ariza:   . Olga Greenberg At approximately 3-1/2 cm from the anal verge in the right side of the rectal wall there was a fu 1.  Stable 3 mm right lower lobe nodule. Per the Fleischner Society Guidelines (Radiology 2017), for  patients at low risk for lung cancer no further imaging follow-up is suggested.  For patients at  higher risk, such as smokers, a follow-up chest CT in 1 PROVIDER:[TOKEN:[1977:MIIS:1977]] PROVIDER:[TOKEN:[[id number]:MIIS:[id number]]]

## 2021-01-01 NOTE — PLAN OF CARE
Patient is alert and oriented x4. Pt is very anxious at times. RN provided therapeutic communications. Pt feeling a bit less anxious. Pt c/o some heartburn, but declined need for tums at this time.  Pt c/o back pain, lidocaine patch and tramadol given with goal  Description: INTERVENTIONS:  - Encourage pt to monitor pain and request assistance  - Assess pain using appropriate pain scale  - Administer analgesics based on type and severity of pain and evaluate response  - Implement non-pharmacological measures needed  - Minimize the use of adhesives  Outcome: Progressing  Goal: Incision/wound heals without complications  Description: Interventions:  - Assess wound bed/incision and surrounding skin tissue  - Collaborate with physician/APN and implement wound/inci

## 2021-01-02 ENCOUNTER — APPOINTMENT (OUTPATIENT)
Dept: PICC SERVICES | Facility: HOSPITAL | Age: 76
DRG: 330 | End: 2021-01-02
Attending: SURGERY
Payer: MEDICARE

## 2021-01-02 ENCOUNTER — APPOINTMENT (OUTPATIENT)
Dept: GENERAL RADIOLOGY | Facility: HOSPITAL | Age: 76
DRG: 330 | End: 2021-01-02
Attending: SURGERY
Payer: MEDICARE

## 2021-01-02 LAB
ALBUMIN SERPL-MCNC: 1.9 G/DL (ref 3.4–5)
ALBUMIN/GLOB SERPL: 0.4 {RATIO} (ref 1–2)
ALP LIVER SERPL-CCNC: 112 U/L
ALT SERPL-CCNC: 33 U/L
ANION GAP SERPL CALC-SCNC: 6 MMOL/L (ref 0–18)
AST SERPL-CCNC: 36 U/L (ref 15–37)
BASOPHILS # BLD AUTO: 0.03 X10(3) UL (ref 0–0.2)
BASOPHILS NFR BLD AUTO: 0.5 %
BILIRUB SERPL-MCNC: 0.4 MG/DL (ref 0.1–2)
BUN BLD-MCNC: 9 MG/DL (ref 7–18)
BUN/CREAT SERPL: 16.7 (ref 10–20)
CALCIUM BLD-MCNC: 8.4 MG/DL (ref 8.5–10.1)
CHLORIDE SERPL-SCNC: 100 MMOL/L (ref 98–112)
CO2 SERPL-SCNC: 26 MMOL/L (ref 21–32)
CREAT BLD-MCNC: 0.54 MG/DL
DEPRECATED RDW RBC AUTO: 47.9 FL (ref 35.1–46.3)
EOSINOPHIL # BLD AUTO: 0.06 X10(3) UL (ref 0–0.7)
EOSINOPHIL NFR BLD AUTO: 0.9 %
ERYTHROCYTE [DISTWIDTH] IN BLOOD BY AUTOMATED COUNT: 13.5 % (ref 11–15)
GLOBULIN PLAS-MCNC: 4.4 G/DL (ref 2.8–4.4)
GLUCOSE BLD-MCNC: 102 MG/DL (ref 70–99)
HAV IGM SER QL: 2.1 MG/DL (ref 1.6–2.6)
HCT VFR BLD AUTO: 29.5 %
HGB BLD-MCNC: 9.5 G/DL
IMM GRANULOCYTES # BLD AUTO: 0.02 X10(3) UL (ref 0–1)
IMM GRANULOCYTES NFR BLD: 0.3 %
LYMPHOCYTES # BLD AUTO: 0.21 X10(3) UL (ref 1–4)
LYMPHOCYTES NFR BLD AUTO: 3.3 %
M PROTEIN MFR SERPL ELPH: 6.3 G/DL (ref 6.4–8.2)
MCH RBC QN AUTO: 31.8 PG (ref 26–34)
MCHC RBC AUTO-ENTMCNC: 32.2 G/DL (ref 31–37)
MCV RBC AUTO: 98.7 FL
MONOCYTES # BLD AUTO: 0.64 X10(3) UL (ref 0.1–1)
MONOCYTES NFR BLD AUTO: 10.1 %
NEUTROPHILS # BLD AUTO: 5.37 X10 (3) UL (ref 1.5–7.7)
NEUTROPHILS # BLD AUTO: 5.37 X10(3) UL (ref 1.5–7.7)
NEUTROPHILS NFR BLD AUTO: 84.9 %
OSMOLALITY SERPL CALC.SUM OF ELEC: 273 MOSM/KG (ref 275–295)
PHOSPHATE SERPL-MCNC: 3.9 MG/DL (ref 2.5–4.9)
PLATELET # BLD AUTO: 300 10(3)UL (ref 150–450)
POTASSIUM SERPL-SCNC: 3.9 MMOL/L (ref 3.5–5.1)
RBC # BLD AUTO: 2.99 X10(6)UL
SODIUM SERPL-SCNC: 132 MMOL/L (ref 136–145)
WBC # BLD AUTO: 6.3 X10(3) UL (ref 4–11)

## 2021-01-02 PROCEDURE — 74021 RADEX ABDOMEN 3+ VIEWS: CPT | Performed by: SURGERY

## 2021-01-02 PROCEDURE — 36573 INSJ PICC RS&I 5 YR+: CPT

## 2021-01-02 PROCEDURE — 80053 COMPREHEN METABOLIC PANEL: CPT | Performed by: INTERNAL MEDICINE

## 2021-01-02 PROCEDURE — 83735 ASSAY OF MAGNESIUM: CPT | Performed by: INTERNAL MEDICINE

## 2021-01-02 PROCEDURE — 02HV33Z INSERTION OF INFUSION DEVICE INTO SUPERIOR VENA CAVA, PERCUTANEOUS APPROACH: ICD-10-PCS | Performed by: SURGERY

## 2021-01-02 PROCEDURE — 71046 X-RAY EXAM CHEST 2 VIEWS: CPT | Performed by: SURGERY

## 2021-01-02 PROCEDURE — 84100 ASSAY OF PHOSPHORUS: CPT | Performed by: INTERNAL MEDICINE

## 2021-01-02 PROCEDURE — 85025 COMPLETE CBC W/AUTO DIFF WBC: CPT | Performed by: INTERNAL MEDICINE

## 2021-01-02 RX ORDER — VANCOMYCIN HYDROCHLORIDE 125 MG/1
125 CAPSULE ORAL DAILY
Status: DISCONTINUED | OUTPATIENT
Start: 2021-01-02 | End: 2021-01-07

## 2021-01-02 RX ORDER — LIDOCAINE HYDROCHLORIDE 10 MG/ML
5 INJECTION, SOLUTION EPIDURAL; INFILTRATION; INTRACAUDAL; PERINEURAL ONCE
Status: DISCONTINUED | OUTPATIENT
Start: 2021-01-02 | End: 2021-01-07

## 2021-01-02 NOTE — PLAN OF CARE
Patient is alert and oriented x4, can be anxious at times. Pain managed with PRN dilaudid. Patient with complaints of sour taste in her mouth/heartburn. Scheduled reglan given. NPO maintained. Ileostomy in place, adequate output. JPx1. Voiding freely.  Up x on pain and pain management  - Manage/alleviate anxiety  - Utilize distraction and/or relaxation techniques  - Monitor for opioid side effects  - Notify MD/LIP if interventions unsuccessful or patient reports new pain  - Anticipate increased pain with acti pressure on wound  - Enterostomal/Wound therapy consult as indicated for ostomies/wounds/G-tubes  Outcome: Progressing     Problem: GASTROINTESTINAL - ADULT  Goal: Minimal or absence of nausea and vomiting  Description: INTERVENTIONS:  - Maintain adequate

## 2021-01-02 NOTE — PROGRESS NOTES
[facility] Hospitalist Progress Note     PCP: Rita Orta, DO    CC: Follow up       Assessment/Plan:   Ms. Cruzito Yates is a 75 yo Female with PMHx of rectal CA, anxiety, HTN, who presented for LAR. Course complicated by N/V with imaging with SBO vs ileus.  Chatterjee spent with patient: 35 mins with > 50% spent counseling patient face to face    Subjective     Has abdominal pain off and on. Did sleep ok last night. Has the acid reflux symptoms this AM. Does feel hungry.      Objective     OBJECTIVE:  Temp:  [98 °F (36.7 12/29/20  0719 12/29/20  0719 12/30/20  0728 12/31/20  0630 01/01/21  0615 01/01/21  1208 01/02/21  0728   * 131* 129*   < > 131* 133* 129* 130* 132*   K 4.0 4.7 3.6   < > 4.3 3.9 4.5 3.8 3.9   CL 98 97* 96*   < > 99 102 97* 98 100   CO2 30.0 32.0 28 amount of fluid extends into the gallbladder fossa but no large abscess or large free air collection is noted. Recommend gastroscopy and surgical evaluation to further evaluate.  2. Status post recent laparoscopic low anterior resection for rectal cancer w in the ostomy bag. Lower abdominal drain noted.     Dictated by (CST): Elizabeth Calderon MD on 12/28/2020 at 9:50 PM     Finalized by (CST): Elizabeth Calderon MD on 12/28/2020 at 9:53 PM          Xr Abdomen Obstructive Series Routine(2 Vw)(cpt=74019)    Result D

## 2021-01-02 NOTE — PROGRESS NOTES
[facility]    CDI Prediction Tool Protocol (Vancomycin Initiated)    OVP (oral vancomycin prophylaxis) 125 mg PO Daily is being started in this patient based on a score of 14.         This patient is currently at high risk for developing CDI due

## 2021-01-02 NOTE — PROGRESS NOTES
[facility]    Progress Note    Andrade Lopez Patient Status:  Inpatient    3/4/1945 MRN [id number]   Location [facility] 4W/SW/SE Attending Little Gilford, MD   [address] Day # 15 PCP Zakiya Campuzano DO        Subjective:    Andrade Lopez 1 drop, 1 drop, Both Eyes, QID PRN    •  traMADol HCl (ULTRAM) tab 50 mg, 50 mg, Oral, Q6H PRN    •  acetaminophen (TYLENOL) tab 650 mg, 650 mg, Oral, Q6H PRN    •  tamsulosin HCl (FLOMAX) cap 0.4 mg, 0.4 mg, Oral, Daily    •  [COMPLETED] potassium chlorid ondansetron HCl (ZOFRAN) injection 4 mg, 4 mg, Intravenous, Q4H PRN    •  [COMPLETED] ceFAZolin sodium (ANCEF/KEFZOL) 2 GM/20ML premix IV syringe 2 g, 2 g, Intravenous, Q8H    •  [COMPLETED] metRONIDAZOLE in NaCl (FLAGYL) IVPB premix 500 mg, 500 mg, Paulene Russell Treitz but not around duodenal sweep        ?  Ulcer disease    Plan  Check UGI with water soluble contrast (discussed with Radiologist)     There is contrast outside of duodenum, diverticulum vs contained leak     Contrast area persists on film this AM (CST): Verónica Rai MD on 1/02/2021 at 10:27 AM          Xr Chest Pa + Lat Chest (cpt=71046)    Result Date: 1/2/2021  CONCLUSION:  1. No free air beneath the hemidiaphragms. 2. Stable mild dependent atelectasis in both lower lobes.   Otherwise no acu to the right as discussed above. No large pelvic fluid collection or abscess is seen. 4. Results were discussed with Dr. Janet Browning who is the physician on-call.     Dictated by (CST): Sky Herman MD on 12/31/2020 at 7:14 PM     Finalized by (CST

## 2021-01-02 NOTE — PLAN OF CARE
Pt A/O x4, can be forgetful & anxious at times. IVF & IV abx continued. Ilad Regkyler. Dilaudid PRN for abd pain. NPO. PATRICIA drain to bulb suction. Colostomy in place & draining, pt emptying own pouch with staff help. Surgical sites C/D/I. Voiding.  Ambulating w evaluate response  - Consider cultural and social influences on pain and pain management  - Manage/alleviate anxiety  - Utilize distraction and/or relaxation techniques  - Monitor for opioid side effects  - Notify MD/LIP if interventions unsuccessful or pa dressing changes as ordered  - Position infant to avoid placing pressure on wound  - Enterostomal/Wound therapy consult as indicated for ostomies/wounds/G-tubes  Outcome: Progressing     Problem: GASTROINTESTINAL - ADULT  Goal: Minimal or absence of nausea

## 2021-01-03 ENCOUNTER — APPOINTMENT (OUTPATIENT)
Dept: CT IMAGING | Facility: HOSPITAL | Age: 76
DRG: 330 | End: 2021-01-03
Attending: SURGERY
Payer: MEDICARE

## 2021-01-03 LAB
ANION GAP SERPL CALC-SCNC: 7 MMOL/L (ref 0–18)
BASOPHILS # BLD AUTO: 0.02 X10(3) UL (ref 0–0.2)
BASOPHILS NFR BLD AUTO: 0.3 %
BUN BLD-MCNC: 7 MG/DL (ref 7–18)
BUN/CREAT SERPL: 15.6 (ref 10–20)
CALCIUM BLD-MCNC: 8 MG/DL (ref 8.5–10.1)
CHLORIDE SERPL-SCNC: 101 MMOL/L (ref 98–112)
CO2 SERPL-SCNC: 26 MMOL/L (ref 21–32)
CREAT BLD-MCNC: 0.45 MG/DL
DEPRECATED RDW RBC AUTO: 47.4 FL (ref 35.1–46.3)
EOSINOPHIL # BLD AUTO: 0.07 X10(3) UL (ref 0–0.7)
EOSINOPHIL NFR BLD AUTO: 1.2 %
ERYTHROCYTE [DISTWIDTH] IN BLOOD BY AUTOMATED COUNT: 13.3 % (ref 11–15)
GLUCOSE BLD-MCNC: 88 MG/DL (ref 70–99)
GLUCOSE BLDC GLUCOMTR-MCNC: 151 MG/DL (ref 70–99)
HAV IGM SER QL: 2 MG/DL (ref 1.6–2.6)
HCT VFR BLD AUTO: 27.2 %
HGB BLD-MCNC: 9.1 G/DL
IMM GRANULOCYTES # BLD AUTO: 0.05 X10(3) UL (ref 0–1)
IMM GRANULOCYTES NFR BLD: 0.9 %
LYMPHOCYTES # BLD AUTO: 0.26 X10(3) UL (ref 1–4)
LYMPHOCYTES NFR BLD AUTO: 4.4 %
MCH RBC QN AUTO: 32.6 PG (ref 26–34)
MCHC RBC AUTO-ENTMCNC: 33.5 G/DL (ref 31–37)
MCV RBC AUTO: 97.5 FL
MONOCYTES # BLD AUTO: 0.6 X10(3) UL (ref 0.1–1)
MONOCYTES NFR BLD AUTO: 10.3 %
NEUTROPHILS # BLD AUTO: 4.85 X10 (3) UL (ref 1.5–7.7)
NEUTROPHILS # BLD AUTO: 4.85 X10(3) UL (ref 1.5–7.7)
NEUTROPHILS NFR BLD AUTO: 82.9 %
OSMOLALITY SERPL CALC.SUM OF ELEC: 275 MOSM/KG (ref 275–295)
PHOSPHATE SERPL-MCNC: 3.1 MG/DL (ref 2.5–4.9)
PLATELET # BLD AUTO: 234 10(3)UL (ref 150–450)
POTASSIUM SERPL-SCNC: 3.7 MMOL/L (ref 3.5–5.1)
RBC # BLD AUTO: 2.79 X10(6)UL
SODIUM SERPL-SCNC: 134 MMOL/L (ref 136–145)
WBC # BLD AUTO: 5.9 X10(3) UL (ref 4–11)

## 2021-01-03 PROCEDURE — 36592 COLLECT BLOOD FROM PICC: CPT

## 2021-01-03 PROCEDURE — 74177 CT ABD & PELVIS W/CONTRAST: CPT | Performed by: SURGERY

## 2021-01-03 PROCEDURE — 80048 BASIC METABOLIC PNL TOTAL CA: CPT | Performed by: SURGERY

## 2021-01-03 PROCEDURE — 82962 GLUCOSE BLOOD TEST: CPT

## 2021-01-03 PROCEDURE — 83735 ASSAY OF MAGNESIUM: CPT | Performed by: SURGERY

## 2021-01-03 PROCEDURE — 84100 ASSAY OF PHOSPHORUS: CPT | Performed by: SURGERY

## 2021-01-03 PROCEDURE — 85025 COMPLETE CBC W/AUTO DIFF WBC: CPT | Performed by: SURGERY

## 2021-01-03 NOTE — PROGRESS NOTES
[facility] Hospitalist Progress Note     PCP: Concepcion Sierra DO    CC: Follow up       Assessment/Plan:   Ms. Carolina Moon is a 75 yo Female with PMHx of rectal CA, anxiety, HTN, who presented for LAR. Course complicated by N/V with imaging with SBO vs ileus.  Chatterjee pending clinical course    Kev Falk MD  [facility] Hospitalist   [facility] Answering service [phone]    Time spent with patient: 35 mins with > 50% spent counseling patient face to face    Subjective     Still with abdominal pain, better with pain meds. 12/30/20  0728 01/01/21  1208 01/02/21  0728 01/03/21  0610   WBC 7.4 6.8 6.3 5.9   HGB 10.2* 10.3* 9.5* 9.1*   MCV 97.8 96.9 98.7 97.5   .0 319.0 300.0 234.0       Recent Labs   Lab 12/28/20  0605 12/29/20  0719 12/29/20  0719 12/31/20  0630 01/01/ Abdomen+pelvis(contrast Only)(cpt=74177)    Result Date: 12/31/2020  CONCLUSION:  1.  There is a significant inflammatory reaction identified adjacent to a dilated distal stomach and duodenum as discussed above extending into the surrounding suzi- duodenal perforation. 2. 3.3 cm collection of contrast along the medial descending duodenum which may represent a duodenal diverticulum but I cannot entirely rule out the possibility of contained perforation, especially in light of recent CT findings.   There is chio

## 2021-01-03 NOTE — PROGRESS NOTES
[facility]    Progress Note    Janine Saldivar Patient Status:  Inpatient    3/4/1945 MRN [id number]   Location [facility] 4W/SW/SE Attending Dylon Willis MD   [facility] Day # 16 PCP Vianney Quinonez DO        Subjective:    Janine Saldivar (REGLAN) injection 10 mg, 10 mg, Intravenous, Q6H    •  lidocaine-menthol 4-1 % 1 patch, 1 patch, Transdermal, Daily    •  [COMPLETED] potassium chloride 40 mEq in sodium chloride 0.9% 250 mL IVPB, 40 mEq, Intravenous, Once    •  glycerin-Hypromellose-PEG 0.4 mg, 0.4 mg, Intravenous, Q2H PRN    Or    •  HYDROmorphone HCl (DILAUDID) 1 MG/ML injection 0.8 mg, 0.8 mg, Intravenous, Q2H PRN    •  PEG 3350 (MIRALAX) powder packet 17 g, 17 g, Oral, Daily PRN    •  magnesium hydroxide (MILK OF MAGNESIA) 400 MG/5ML 12/28  CT 12/31 with significant paraduodenal inflammation and retained gastric contents without SBO     Consistent with symptoms and functioning stoma     Etiology of inflammatory process not clear,         surgical field was in LUQ and around ligament of 1/2/2021  CONCLUSION:  1. Persistent collection of contrast in the right upper quadrant, which corresponds to the duodenal perforation versus duodenal diverticulum seen on the recent CT. 2. No free air beneath the hemidiaphragms.  3. Otherwise nonobstructiv

## 2021-01-03 NOTE — DIETARY NOTE
ADULT NUTRITION REASSESSMENT     Pt is at high nutrition risk due to TPN start. Pt does not meet malnutrition criteria. RECOMMENDATIONS TO MD:  See Nutrition Intervention for TPN specifics.        NUTRITION DIAGNOSIS/PROBLEM:  Inadequate oral intake ml  Diet: Parenteral Nutrition (PN) and NPO at this time. - Meals and snacks: NPO    - Parenteral Nutrition: to start at 2100:  2000 ml volume, 75g protein, 1000 non protein calories (600 calories from dextrose and 400 calories from fat).   Provides 13 lb)  09/30/20 : 78.3 kg (172 lb 11.2 oz)  09/23/20 : 78.9 kg (173 lb 14.4 oz)  09/16/20 : 80.2 kg (176 lb 12.8 oz)  09/09/20 : 79.4 kg (175 lb)      GASTROINTESTINAL: heartburn and nausea, colostomy with output. Edwin Hartman       FOOD/NUTRITION RELATED HISTORY:  Jt wt within 5%, PO and supplement greater than 75% of needs, promote healing and improved GI status    DIETITIAN FOLLOW UP: RD to follow.       [address], [address] [facility]   Clinical Dietitian [id number]

## 2021-01-03 NOTE — PLAN OF CARE
Problem: Patient Centered Care  Goal: Patient preferences are identified and integrated in the patient's plan of care  Description: Interventions:  - What would you like us to know as we care for you?  I live at home with my sister  - Provide timely, comp goals for specific interventions  Outcome: Not Progressing  Goal: Patient/Family Short Term Goal  Description: Patient's Short Term Goal: to be free from pain, nausea and vomiting    Interventions:   -   - See additional Care Plan goals for specific interv tolerated  - Nasogastric tube to low intermittent suction as ordered  - Evaluate effectiveness of ordered antiemetic medications  - Provide nonpharmacologic comfort measures as appropriate  - Advance diet as tolerated, if ordered  - Obtain nutritional cons

## 2021-01-04 ENCOUNTER — APPOINTMENT (OUTPATIENT)
Dept: ULTRASOUND IMAGING | Facility: HOSPITAL | Age: 76
DRG: 330 | End: 2021-01-04
Attending: SURGERY
Payer: MEDICARE

## 2021-01-04 LAB
ALBUMIN SERPL-MCNC: 1.9 G/DL (ref 3.4–5)
ALBUMIN/GLOB SERPL: 0.5 {RATIO} (ref 1–2)
ALP LIVER SERPL-CCNC: 98 U/L
ALT SERPL-CCNC: 27 U/L
ANION GAP SERPL CALC-SCNC: 7 MMOL/L (ref 0–18)
ANION GAP SERPL CALC-SCNC: 7 MMOL/L (ref 0–18)
AST SERPL-CCNC: 26 U/L (ref 15–37)
BASOPHILS # BLD AUTO: 0.02 X10(3) UL (ref 0–0.2)
BASOPHILS NFR BLD AUTO: 0.4 %
BILIRUB SERPL-MCNC: 0.4 MG/DL (ref 0.1–2)
BUN BLD-MCNC: 7 MG/DL (ref 7–18)
BUN BLD-MCNC: 7 MG/DL (ref 7–18)
BUN/CREAT SERPL: 14.6 (ref 10–20)
BUN/CREAT SERPL: 14.6 (ref 10–20)
CALCIUM BLD-MCNC: 8.2 MG/DL (ref 8.5–10.1)
CALCIUM BLD-MCNC: 8.2 MG/DL (ref 8.5–10.1)
CHLORIDE SERPL-SCNC: 101 MMOL/L (ref 98–112)
CHLORIDE SERPL-SCNC: 101 MMOL/L (ref 98–112)
CO2 SERPL-SCNC: 27 MMOL/L (ref 21–32)
CO2 SERPL-SCNC: 27 MMOL/L (ref 21–32)
CREAT BLD-MCNC: 0.48 MG/DL
CREAT BLD-MCNC: 0.48 MG/DL
DEPRECATED RDW RBC AUTO: 46.3 FL (ref 35.1–46.3)
EOSINOPHIL # BLD AUTO: 0.04 X10(3) UL (ref 0–0.7)
EOSINOPHIL NFR BLD AUTO: 0.7 %
ERYTHROCYTE [DISTWIDTH] IN BLOOD BY AUTOMATED COUNT: 13.2 % (ref 11–15)
GLOBULIN PLAS-MCNC: 4.2 G/DL (ref 2.8–4.4)
GLUCOSE BLD-MCNC: 109 MG/DL (ref 70–99)
GLUCOSE BLD-MCNC: 109 MG/DL (ref 70–99)
GLUCOSE BLDC GLUCOMTR-MCNC: 124 MG/DL (ref 70–99)
GLUCOSE BLDC GLUCOMTR-MCNC: 147 MG/DL (ref 70–99)
GLUCOSE BLDC GLUCOMTR-MCNC: 158 MG/DL (ref 70–99)
GLUCOSE BLDC GLUCOMTR-MCNC: 165 MG/DL (ref 70–99)
HAV IGM SER QL: 2.1 MG/DL (ref 1.6–2.6)
HCT VFR BLD AUTO: 27.5 %
HGB BLD-MCNC: 9.1 G/DL
IMM GRANULOCYTES # BLD AUTO: 0.03 X10(3) UL (ref 0–1)
IMM GRANULOCYTES NFR BLD: 0.6 %
LYMPHOCYTES # BLD AUTO: 0.26 X10(3) UL (ref 1–4)
LYMPHOCYTES NFR BLD AUTO: 4.8 %
M PROTEIN MFR SERPL ELPH: 6.1 G/DL (ref 6.4–8.2)
MCH RBC QN AUTO: 31.6 PG (ref 26–34)
MCHC RBC AUTO-ENTMCNC: 33.1 G/DL (ref 31–37)
MCV RBC AUTO: 95.5 FL
MONOCYTES # BLD AUTO: 0.61 X10(3) UL (ref 0.1–1)
MONOCYTES NFR BLD AUTO: 11.2 %
NEUTROPHILS # BLD AUTO: 4.49 X10 (3) UL (ref 1.5–7.7)
NEUTROPHILS # BLD AUTO: 4.49 X10(3) UL (ref 1.5–7.7)
NEUTROPHILS NFR BLD AUTO: 82.3 %
OSMOLALITY SERPL CALC.SUM OF ELEC: 279 MOSM/KG (ref 275–295)
OSMOLALITY SERPL CALC.SUM OF ELEC: 279 MOSM/KG (ref 275–295)
PHOSPHATE SERPL-MCNC: 2.7 MG/DL (ref 2.5–4.9)
PLATELET # BLD AUTO: 229 10(3)UL (ref 150–450)
POTASSIUM SERPL-SCNC: 3.3 MMOL/L (ref 3.5–5.1)
POTASSIUM SERPL-SCNC: 3.3 MMOL/L (ref 3.5–5.1)
RBC # BLD AUTO: 2.88 X10(6)UL
SODIUM SERPL-SCNC: 135 MMOL/L (ref 136–145)
SODIUM SERPL-SCNC: 135 MMOL/L (ref 136–145)
WBC # BLD AUTO: 5.5 X10(3) UL (ref 4–11)

## 2021-01-04 PROCEDURE — C9113 INJ PANTOPRAZOLE SODIUM, VIA: HCPCS | Performed by: PHYSICIAN ASSISTANT

## 2021-01-04 PROCEDURE — 97530 THERAPEUTIC ACTIVITIES: CPT

## 2021-01-04 PROCEDURE — 80053 COMPREHEN METABOLIC PANEL: CPT | Performed by: SURGERY

## 2021-01-04 PROCEDURE — 85025 COMPLETE CBC W/AUTO DIFF WBC: CPT | Performed by: SURGERY

## 2021-01-04 PROCEDURE — 84100 ASSAY OF PHOSPHORUS: CPT | Performed by: SURGERY

## 2021-01-04 PROCEDURE — 83735 ASSAY OF MAGNESIUM: CPT | Performed by: SURGERY

## 2021-01-04 PROCEDURE — 76705 ECHO EXAM OF ABDOMEN: CPT | Performed by: SURGERY

## 2021-01-04 PROCEDURE — 36592 COLLECT BLOOD FROM PICC: CPT

## 2021-01-04 PROCEDURE — 82962 GLUCOSE BLOOD TEST: CPT

## 2021-01-04 PROCEDURE — 97116 GAIT TRAINING THERAPY: CPT

## 2021-01-04 RX ORDER — POTASSIUM CHLORIDE 29.8 MG/ML
40 INJECTION INTRAVENOUS ONCE
Status: COMPLETED | OUTPATIENT
Start: 2021-01-04 | End: 2021-01-04

## 2021-01-04 NOTE — PLAN OF CARE
Problem: Patient Centered Care  Goal: Patient preferences are identified and integrated in the patient's plan of care  Description: Interventions:  - What would you like us to know as we care for you?  I live at home with my sister  - Provide timely, comp the use of adhesives  Outcome: Progressing  Goal: Incision/wound heals without complications  Description: Interventions:  - Assess wound bed/incision and surrounding skin tissue  - Collaborate with physician/APN and implement wound/incision site care and adequate nutritional intake (undernourished)  Description: INTERVENTIONS:  - Monitor percentage of each meal consumed  - Identify factors contributing to decreased intake, treat as appropriate  - Assist with meals as needed  - Monitor I&O, WT and lab value

## 2021-01-04 NOTE — PAYOR COMM NOTE
--------------  CONTINUED STAY REVIEW    Payor: [address] [place] [address] #:  [id number]  Authorization Number: [id number]    Admit date: 12/18/20  Admit time: 9119    Admitting Physician: Jacob Kayser, MD  Attending Physician:  Little Gilford, Ihor Medal murmurs  ABD: Soft, mild TTP, mildly distended, +BS  Neuro: Grossly normal, CN intact, sensory intact  Psych: Affect- normal  SKIN: warm, dry  EXT: trace edema    Duodenitis with possible duodenal diverticulum vs. Contained perforation  - 3.3 cm collection pericholecystic fluid. No well-defined gallstones. It is difficult to assess sonographic Fuller sign since the   patient did received pain medication earlier. I cannot exclude acute acalculous cholecystitis. Correlate clinically and with HIDA scanning. No acute distress. Alert and oriented x 3. HEENT: Moist mucous membranes. EOM-I. PERRL  Neck: No lymphadenopathy. No JVD. No carotid bruits. Respiratory: Clear to auscultation bilaterally. Cardiovascular: S1, S2.  Regular rate and rhythm.     Abdomen: cholecystitis.  Correlate clinically and with HIDA scanning.     Plan  Ostomy care  UGI shows contrast outside of duodenum, contained leak vs diverticulum, repeat CT more suggestive of diverticulum, US GB with ?  Acute cholecystitis with contracted GB    em Chen Jasso RN      Piperacillin Sod-Tazobactam So (ZOSYN) 3.375 g in dextrose 5 % 100 mL ADD-vantage     Date Action Dose Route User    1/4/2021 1439 New Bag 3.375 g Intravenous Renu Abbott RN    1/4/2021 0622 New Bag 3.375 g Intravenous Elen Tinsley

## 2021-01-04 NOTE — PAYOR COMM NOTE
--------------  CONTINUED STAY REVIEW    Payor: [address] #:  [id number]  Authorization Number: [id number]    Admit date: 12/18/20  Admit time: 6000    Admitting Physician: Rodney Garza MD  Attending Physician:  Eulalia Sharma [place] examination. PROCEDURE:  CT ABDOMEN + PELVIS  12-31-20     LIVER:  No enlargement, atrophy, abnormal density, or significant focal lesion. GALLBLADDER:            The gallbladder is contracted, and there is some enhancement of the wall.   There is for rectal carcinoma with a diverting loop ileostomy with a stoma located in the right anterior abdominal wall. There is lower abdominal/pelvic drain identified entering on the left and   extending to the right lower abdomen/pelvis.   No definite bowel obs collection or abscess is seen.     She continues to complain of upper abdominal bloating and pain, some belching and sour taste  Pain control adequate with ultram  Has voided spontaneously, increased volume voided   Denies cp, SOB or leg pain.     CT last n started  Continue Reglan q6h ATC rather than prn  Continue IVF for now, monitor fluid status     If prolonged bowel rest needed she may need TPN started          REVIEW DOCUMENTATION:   1-2-21 01/02/21 2052 99.5 °F (37.5 °C) 83 19 133/68 97 % — None (Ro Oral, Daily     •  dextrose 10 % infusion, , Intravenous, Continuous PRN     •  Piperacillin Sod-Tazobactam So (ZOSYN) 3.375 g in dextrose 5 % 100 mL ADD-vantage, 3.375 g, Intravenous, Q8H     •  [COMPLETED] iopamidol (ISOVUE-M) 76 % injection 100 mL, 100

## 2021-01-04 NOTE — PROGRESS NOTES
"Requested Prescriptions   Pending Prescriptions Disp Refills     amLODIPine (NORVASC) 5 MG tablet 90 tablet 3     Sig: Take 1 tablet (5 mg) by mouth daily Takes prn for high blood pressure   Last Written Prescription Date:  4/4/18  Last Fill Quantity: 90 tab,  # refills: 3   Last office visit: 4/10/2019 with prescribing provider:  Chris Scott     Future Office Visit:   Next 5 appointments (look out 90 days)    Apr 29, 2019  2:15 PM CDT  [facility] Evgeny Torres MD  [facility] [address] [facility] [address]  [phone]   May 06, 2019  4:20 PM CDT  Return Visit with Ethan Montesinos DPM  [facility] ([facility]) [address] [facility]  [address]  [facility] [address]  [phone]             Calcium Channel Blockers Protocol  Failed - 4/24/2019 10:41 AM        Failed - Blood pressure under 140/90 in past 12 months     BP Readings from Last 3 Encounters:   04/22/19 (!) 189/93   04/15/19 156/81   04/10/19 110/62                 Failed - Recent (12 mo) or future (30 days) visit within the authorizing provider's specialty     Patient had office visit in the last 12 months or has a visit in the next 30 days with authorizing provider or within the authorizing provider's specialty.  See \"Patient Info\" tab in inbasket, or \"Choose Columns\" in Meds & Orders section of the refill encounter.              Passed - Medication is active on med list        Passed - Patient is age 18 or older        Passed - Normal serum creatinine on file in past 12 months     Recent Labs   Lab Test 04/01/19  1451   CR 0.91               " [facility] Hospitalist Progress Note     PCP: León Freedman DO    CC: Follow up       Assessment/Plan:     Ms. Demetria Yousif is a 75 yo Female with PMHx of rectal CA, anxiety, HTN, who presented for LAR. Course complicated by N/V with imaging with SBO vs ileus.  D Prophylaxis: HSQ    Dispo: pending clinical course    Tianna Grimes MD  [facility] Hospitalist   [facility] Answering service [phone]    Time spent with patient: 35 mins with > 50% spent counseling patient face to face    Subjective     Still having abdominal p HYDROmorphone HCl, PEG 3350, magnesium hydroxide, ondansetron HCl    Data Review:       Labs:     Recent Labs   Lab 12/30/20  0728 01/01/21  1208 01/02/21  0728 01/03/21  0610 01/04/21  0620   WBC 7.4 6.8 6.3 5.9 5.5   HGB 10.2* 10.3* 9.5* 9.1* 9.1*   MCV process.     Dictated by (CST): Audra Pritchard MD on 1/02/2021 at 10:15 AM     Finalized by (CST): Audra Pritchard MD on 1/02/2021 at 10:17 AM          Ct Abdomen+pelvis(contrast Only)(cpt=74177)    Result Date: 1/3/2021  CONCLUSION:   Gastritis invo collection is noted. Recommend gastroscopy and surgical evaluation to further evaluate. 2. Status post recent laparoscopic low anterior resection for rectal cancer with a diverting ileostomy. Decompressed colon. Apparent left hemicolectomy.  3. Contracte 12/28/2020 at 9:50 PM     Finalized by (CST): Elizabeth Calderon MD on 12/28/2020 at 9:53 PM          Xr Abdomen Obstructive Series Routine(2 Vw)(cpt=74019)    Result Date: 12/24/2020  CONCLUSION:   No evidence of bowel obstruction.   Small bilateral pleural ef

## 2021-01-04 NOTE — PROGRESS NOTES
[facility]    Progress Note    Karri Gamez Patient Status:  Inpatient    3/4/1945 MRN [id number]   Location [facility] 4W/SW/SE Attending Kevon Perez MD   [facility] Day # 16 PCP Jennifer Sy DO        Subjective:    Karri Gamez Intravenous, ONCE PRN    •  [COMPLETED] potassium chloride 40 mEq in sodium chloride 0.9% 250 mL IVPB, 40 mEq, Intravenous, Once    •  [COMPLETED] Magnesium Sulfate IVPB premix SOLN 2 g, 2 g, Intravenous, Once    •  Metoclopramide HCl (REGLAN) injection 10 Sodium (Porcine) 5000 UNIT/ML injection 5,000 Units, 5,000 Units, Subcutaneous, Q8H [address]    •  HYDROmorphone HCl (DILAUDID) 1 MG/ML injection 0.2 mg, 0.2 mg, Intravenous, Q2H PRN    Or    •  HYDROmorphone HCl (DILAUDID) 1 MG/ML injection 0.4 mg, 0.4 mg, Int [facility])  POD#17 s/p laparoscopic LAR (taTME) with diverting loop ileostomy    Stoma output continues and is improved, now with some rectal drainage/passage of stool as well  Nausea still on and off but has generally improved, has not used Zofran since 12/25, Value Date    WBC 5.5 01/04/2021    HGB 9.1 (L) 01/04/2021    HCT 27.5 (L) 01/04/2021    .0 01/04/2021    CREATSERUM 0.48 (L) 01/04/2021    CREATSERUM 0.48 (L) 01/04/2021    BUN 7 01/04/2021    BUN 7 01/04/2021     (L) 01/04/2021     (L)

## 2021-01-04 NOTE — CM/SW NOTE
PREET updated [facility] the pt. Remains in the hospital via Aidin and sent updated clinical information. The pt. Remains on TPN and NPO at this time. Plan is to return home with her sister and [facility] when medically stable for discharge.       Kosta Courtney

## 2021-01-04 NOTE — PHYSICAL THERAPY NOTE
PHYSICAL THERAPY TREATMENT NOTE - INPATIENT     Room Number: 691/412-H       Presenting Problem: rectal cancer s/p colon resection and ileostomy    Problem List  Active Problems:    Rectal cancer ([facility] [address]    Preop testing      PHYSICAL THERAPY ASSESSMENT Techniques: Body mechanics; Activity promotion;Repositioning    BALANCE                                                                                                                     Static Sitting: Good  Dynamic Sitting: Good           Static Standing supervision    Goal #3 Patient is able to ambulate 200 feet with assist device: walker - rolling at assistance level: supervision   Goal #3   Current Status 100 feet with RW Supervision    Goal #4 Patient will negotiate 2 stairs/one curb w/ assistive devic

## 2021-01-04 NOTE — PLAN OF CARE
Pt is A&Ox4, VSS on room air. Complaining of minimal abdominal pain, declining to take medication. R PICC in place, scheduled zosyn given, TPN infusing. Q6 accucheck. Started on clear liquids today, tolerating well, no n/v. Voiding freely.  Passing mucus fr monitor pain and request assistance  - Assess pain using appropriate pain scale  - Administer analgesics based on type and severity of pain and evaluate response  - Implement non-pharmacological measures as appropriate and evaluate response  - Consider cul Progressing  Goal: Incision/wound heals without complications  Description: Interventions:  - Assess wound bed/incision and surrounding skin tissue  - Collaborate with physician/APN and implement wound/incision site care and dressing changes as ordered  -

## 2021-01-05 ENCOUNTER — APPOINTMENT (OUTPATIENT)
Dept: NUCLEAR MEDICINE | Facility: HOSPITAL | Age: 76
DRG: 330 | End: 2021-01-05
Attending: PHYSICIAN ASSISTANT
Payer: MEDICARE

## 2021-01-05 LAB
ANION GAP SERPL CALC-SCNC: 6 MMOL/L (ref 0–18)
BUN BLD-MCNC: 9 MG/DL (ref 7–18)
BUN/CREAT SERPL: 15.3 (ref 10–20)
CALCIUM BLD-MCNC: 8.5 MG/DL (ref 8.5–10.1)
CHLORIDE SERPL-SCNC: 105 MMOL/L (ref 98–112)
CO2 SERPL-SCNC: 26 MMOL/L (ref 21–32)
CREAT BLD-MCNC: 0.59 MG/DL
GLUCOSE BLD-MCNC: 111 MG/DL (ref 70–99)
GLUCOSE BLDC GLUCOMTR-MCNC: 124 MG/DL (ref 70–99)
GLUCOSE BLDC GLUCOMTR-MCNC: 153 MG/DL (ref 70–99)
HAV IGM SER QL: 2.4 MG/DL (ref 1.6–2.6)
OSMOLALITY SERPL CALC.SUM OF ELEC: 283 MOSM/KG (ref 275–295)
PHOSPHATE SERPL-MCNC: 3.3 MG/DL (ref 2.5–4.9)
POTASSIUM SERPL-SCNC: 4.1 MMOL/L (ref 3.5–5.1)
SODIUM SERPL-SCNC: 137 MMOL/L (ref 136–145)

## 2021-01-05 PROCEDURE — C9113 INJ PANTOPRAZOLE SODIUM, VIA: HCPCS | Performed by: PHYSICIAN ASSISTANT

## 2021-01-05 PROCEDURE — 97530 THERAPEUTIC ACTIVITIES: CPT

## 2021-01-05 PROCEDURE — 83735 ASSAY OF MAGNESIUM: CPT | Performed by: SURGERY

## 2021-01-05 PROCEDURE — 97116 GAIT TRAINING THERAPY: CPT

## 2021-01-05 PROCEDURE — 82962 GLUCOSE BLOOD TEST: CPT

## 2021-01-05 PROCEDURE — 80048 BASIC METABOLIC PNL TOTAL CA: CPT | Performed by: SURGERY

## 2021-01-05 PROCEDURE — 84100 ASSAY OF PHOSPHORUS: CPT | Performed by: SURGERY

## 2021-01-05 PROCEDURE — 78227 HEPATOBIL SYST IMAGE W/DRUG: CPT | Performed by: PHYSICIAN ASSISTANT

## 2021-01-05 RX ORDER — NYSTATIN 100000 U/G
CREAM TOPICAL 2 TIMES DAILY
Status: DISCONTINUED | OUTPATIENT
Start: 2021-01-05 | End: 2021-01-07

## 2021-01-05 NOTE — OCCUPATIONAL THERAPY NOTE
OCCUPATIONAL THERAPY TREATMENT NOTE - INPATIENT        Room Number: 617/869-W    Presenting Problem: (s/p colon resection & ileostomy due to rectal CA )    Problem List  Active Problems:    Rectal cancer ([address]    Preop testing    OCCUPATIONAL THERAPY ASSES RESTRICTION  Weight Bearing Restriction: None    PAIN ASSESSMENT  Rating: (pt did not rate pain)  Location: (generalized abdomen)  Management Techniques:  Activity promotion     ACTIVITY TOLERANCE  Good    ACTIVITIES OF DAILY LIVING ASSESSMENT  AM-PAC ‘6-Cl

## 2021-01-05 NOTE — PAYOR COMM NOTE
--------------  CONTINUED STAY REVIEW    Payor: [id number] [place] [address] #:  [id number]  Authorization Number: [id number]    Admit date: 12/18/20  Admit time: 6789    Admitting Physician: Lance Birmingham MD  Attending Physician:  Junior Dexter deficits. Musculoskeletal: Full range of motion of all extremities. BL LE swelling noted to have decreased, no redness/ no tenderness. Integument: No lesions. No erythema.     Plan  Ostomy care  UGI shows contrast outside of duodenum, contained leak vs d User    1/5/2021 1105 Given (none) Topical Trini Fountain, RN      Pantoprazole Sodium (PROTONIX) 40 mg in Sodium Chloride (PF) 0.9 % 10 mL IV push     Date Action Dose Route User    1/5/2021 1105 Given 40 mg Intravenous Maryan Perez RN    1/4/202

## 2021-01-05 NOTE — PROGRESS NOTES
[facility] Hospitalist Progress Note     PCP: Norma Cavazos DO    CC: Follow up       Assessment/Plan:     Ms. Lova Skiff is a 77 yo Female with PMHx of rectal CA, anxiety, HTN, who presented for LAR. Course complicated by N/V with imaging with SBO vs ileus.  D pending clinical course    Sonido Day MD  [facility] Hospitalist   [facility] Answering service [phone]    Subjective     Tolerating eggs and toast. Still on TPN. Does feel full quickly. No pain at this time.      Objective     OBJECTIVE:  Temp:  [98 °F (36.7 hydroxide, ondansetron HCl    Data Review:       Labs:     Recent Labs   Lab 12/30/20  0728 01/01/21  1208 01/02/21  0728 01/03/21  0610 01/04/21  0620   WBC 7.4 6.8 6.3 5.9 5.5   HGB 10.2* 10.3* 9.5* 9.1* 9.1*   MCV 97.8 96.9 98.7 97.5 95.5   .0 31 Only)(cpt=74177)    Result Date: 1/3/2021  CONCLUSION:   Gastritis involving the gastric pylorus and antrum with duodenitis involving the 1st portion of the duodenum, mildly improved but not entirely resolved since 12/31/2020.   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 Decompressed colon. Apparent left hemicolectomy.  3. Contracted gallbladder with enhancement of the wall which may be related to the adjacent inflammatory reaction about the distal stomach and duodenum as discussed above, but I can not exclude fluid and debris-filled distention of the stomach. Right lower quadrant ostomy with contrast.  Contrast is seen in the ostomy bag. Lower abdominal drain noted.     Dictated by (CST): Tisha Jerry MD on 12/28/2020 at 9:50 PM     Finalized by (CST): [facility]

## 2021-01-05 NOTE — PLAN OF CARE
No acute changes during shift. A&O x4; forgetful at times. VSS; pain treated with tramadol. Up with walker and 1 assist. NPO since midnight; HIDA scan this AM. R ileostomy. PATRICIA drain in place. Voiding freely. TPN infusing. Zosyn for IV abx.  No complaints of Encourage pt to monitor pain and request assistance  - Assess pain using appropriate pain scale  - Administer analgesics based on type and severity of pain and evaluate response  - Implement non-pharmacological measures as appropriate and evaluate response adhesives  Outcome: Progressing  Goal: Incision/wound heals without complications  Description: Interventions:  - Assess wound bed/incision and surrounding skin tissue  - Collaborate with physician/APN and implement wound/incision site care and dressing ch

## 2021-01-05 NOTE — PROGRESS NOTES
[place] [facility]    Progress Note    Eduard Mitchell Patient Status:  Inpatient    3/4/1945 MRN [id number]   Location [facility] 4W/SW/SE Attending Gilbert Cherry MD   [facility] Day # 25 PCP Cristiano Iraheta DO        Subjective:    Eduard Mitchell dextrose 5 % 100 mL ADD-vantage, 3.375 g, Intravenous, Q8H    •  [COMPLETED] iopamidol (ISOVUE-M) 76 % injection 100 mL, 100 mL, Intravenous, ONCE PRN    •  [COMPLETED] potassium chloride 40 mEq in sodium chloride 0.9% 250 mL IVPB, 40 mEq, Intravenous, Onc injection 4 mg, 4 mg, Intravenous, Once PRN    •  Normal Saline Flush 0.9 % injection 10 mL, 10 mL, Intravenous, PRN    •  Heparin Sodium (Porcine) 5000 UNIT/ML injection 5,000 Units, 5,000 Units, Subcutaneous, Q8H [address]    •  HYDROmorphone HCl (DILAUDID) 1 redness/ no tenderness. Integument: L groin crease with some redness/satelite lesions. Psychiatric: Appropriate mood and affect.         Assessment and Plan:   Rectal cancer ([facility] [address]  POD#18 s/p laparoscopic LAR (taTME) with diverting loop ileostomy    Stoma daily  Continue Reglan q6h ATC rather than prn  Continue IVF for now, monitor fluid status     TPN, will trial diet as HIDA scan okay  Continue Flomax and monitoring UOP  Start Nystatin for L groin redness    Janice Orellana PA-C  01/05/21  10:10 AM

## 2021-01-05 NOTE — CM/SW NOTE
MD order received regarding the pt. Is considering SNF at discharge. PREET met with the pt. And her brother in law Con Glynn at bedside. PREET discussed in length with both the pt. And Con Glynn the pt's progress, her eating and her moving with therapy.   After discussio

## 2021-01-05 NOTE — PLAN OF CARE
Pt is A&Ox4, VSS on room air. Complaining of minimal to no abdominal pain. R PICC in place, scheduled zosyn given, TPN infusing. Started on low fiber/soft diet today, tolerating well, no n/v. Voiding freely.  Continuing to pass small amounts of mucus from h appropriate pain scale  - Administer analgesics based on type and severity of pain and evaluate response  - Implement non-pharmacological measures as appropriate and evaluate response  - Consider cultural and social influences on pain and pain management complications  Description: Interventions:  - Assess wound bed/incision and surrounding skin tissue  - Collaborate with physician/APN and implement wound/incision site care and dressing changes as ordered  - Position infant to avoid placing pressure on wou

## 2021-01-05 NOTE — PHYSICAL THERAPY NOTE
PHYSICAL THERAPY TREATMENT NOTE - INPATIENT     Room Number: 749/682-O       Presenting Problem: rectal cancer s/p colon resection and ileostomy    Problem List  Active Problems:    Rectal cancer ([facility] [address]    Preop testing      PHYSICAL THERAPY ASSESSMENT   Pt Rating: (Did not rate)  Location: abdomen  Management Techniques: Activity promotion; Body mechanics; Relaxation;Repositioning    BALANCE                                                                                                                     St independent with walker - rolling      Goal #2  Current Status Supervision    Goal #3 Patient is able to ambulate 200 feet with assist device: walker - rolling at assistance level: supervision   Goal #3   Current Status X120' with RW, supervision   Goal #4

## 2021-01-06 LAB
ANION GAP SERPL CALC-SCNC: 5 MMOL/L (ref 0–18)
BUN BLD-MCNC: 11 MG/DL (ref 7–18)
BUN/CREAT SERPL: 19.6 (ref 10–20)
CALCIUM BLD-MCNC: 8.4 MG/DL (ref 8.5–10.1)
CHLORIDE SERPL-SCNC: 106 MMOL/L (ref 98–112)
CO2 SERPL-SCNC: 24 MMOL/L (ref 21–32)
CREAT BLD-MCNC: 0.56 MG/DL
GLUCOSE BLD-MCNC: 80 MG/DL (ref 70–99)
HAV IGM SER QL: 2.4 MG/DL (ref 1.6–2.6)
OSMOLALITY SERPL CALC.SUM OF ELEC: 278 MOSM/KG (ref 275–295)
PHOSPHATE SERPL-MCNC: 3.7 MG/DL (ref 2.5–4.9)
POTASSIUM SERPL-SCNC: 4.6 MMOL/L (ref 3.5–5.1)
SODIUM SERPL-SCNC: 135 MMOL/L (ref 136–145)

## 2021-01-06 PROCEDURE — 80048 BASIC METABOLIC PNL TOTAL CA: CPT | Performed by: SURGERY

## 2021-01-06 PROCEDURE — 97530 THERAPEUTIC ACTIVITIES: CPT

## 2021-01-06 PROCEDURE — 83735 ASSAY OF MAGNESIUM: CPT | Performed by: COLON & RECTAL SURGERY

## 2021-01-06 PROCEDURE — 87338 HPYLORI STOOL AG IA: CPT | Performed by: INTERNAL MEDICINE

## 2021-01-06 PROCEDURE — 99214 OFFICE O/P EST MOD 30 MIN: CPT

## 2021-01-06 PROCEDURE — 84100 ASSAY OF PHOSPHORUS: CPT | Performed by: SURGERY

## 2021-01-06 PROCEDURE — 97110 THERAPEUTIC EXERCISES: CPT

## 2021-01-06 PROCEDURE — C9113 INJ PANTOPRAZOLE SODIUM, VIA: HCPCS | Performed by: PHYSICIAN ASSISTANT

## 2021-01-06 PROCEDURE — 97116 GAIT TRAINING THERAPY: CPT

## 2021-01-06 RX ORDER — TRAMADOL HYDROCHLORIDE 50 MG/1
50 TABLET ORAL EVERY 6 HOURS PRN
Qty: 15 TABLET | Refills: 0 | Status: SHIPPED | OUTPATIENT
Start: 2021-01-06 | End: 2021-01-07

## 2021-01-06 NOTE — PLAN OF CARE
Pt is alert/oriented. Vitals stable. Tolerating low fiber, soft diet. Ambulating well standby. TPN to be discontinued once current bag is completed. No complaints of pain. Receiving Zosyn. Heparin and SCDs for DVT prophylaxis. Voiding adequately.  Wound RN comfort level or patient's stated pain goal  Description: INTERVENTIONS:  - Encourage pt to monitor pain and request assistance  - Assess pain using appropriate pain scale  - Administer analgesics based on type and severity of pain and evaluate response  - barrier if needed  - Change diapers as needed  - Minimize the use of adhesives  Outcome: Progressing  Goal: Incision/wound heals without complications  Description: Interventions:  - Assess wound bed/incision and surrounding skin tissue  - Collaborate with

## 2021-01-06 NOTE — PROGRESS NOTES
[place] [facility] - [facility]    Progress Note    Eddye Hare Patient Status:  Inpatient    3/4/1945 MRN [id number]   Location [facility] 4W/SW/SE Attending Charu Montana MD   [facility] Day # 23 PCP Media Bio, DO        Subjective:    Eddye Hare 3.375 g in dextrose 5 % 100 mL ADD-vantage, 3.375 g, Intravenous, Q8H    •  [COMPLETED] iopamidol (ISOVUE-M) 76 % injection 100 mL, 100 mL, Intravenous, ONCE PRN    •  [COMPLETED] potassium chloride 40 mEq in sodium chloride 0.9% 250 mL IVPB, 40 mEq, Intra Flush 0.9 % injection 10 mL, 10 mL, Intravenous, PRN    •  Heparin Sodium (Porcine) 5000 UNIT/ML injection 5,000 Units, 5,000 Units, Subcutaneous, Q8H BELLA    •  HYDROmorphone HCl (DILAUDID) 1 MG/ML injection 0.2 mg, 0.2 mg, Intravenous, Q2H PRN    Or    • lesions that appear to have improved from yesterday   Psychiatric: Appropriate mood and affect.         Assessment and Plan:   Rectal cancer ([facility] [address]  POD#19 s/p laparoscopic LAR (taTME) with diverting loop ileostomy    Stoma output continues and is improved, cholecystitis with contracted GB    empiric antibiotics - on zosyn    HIDA scan today, normal    Protonix 40mg IV - change to daily    Tolerating diet     Stop TPN    Stop and monitor off reglan     Recommend GI consultation for duodenitis w/u       D/w

## 2021-01-06 NOTE — PLAN OF CARE
A&O x4; forgetful and anxious at times. Constant reorientation provided. VSS; pain treated with dilaudid. Up with walker and standby assist. TPN; low fiber soft diet. Ileostomy with adequate output. Zosyn for IV abx. Daily weight.  HIDA scan showed no evide goal  Description: INTERVENTIONS:  - Encourage pt to monitor pain and request assistance  - Assess pain using appropriate pain scale  - Administer analgesics based on type and severity of pain and evaluate response  - Implement non-pharmacological measures needed  - Minimize the use of adhesives  Outcome: Progressing  Goal: Incision/wound heals without complications  Description: Interventions:  - Assess wound bed/incision and surrounding skin tissue  - Collaborate with physician/APN and implement wound/inci

## 2021-01-06 NOTE — PAYOR COMM NOTE
--------------  CONTINUED STAY REVIEW    Payor: [id number] [place] [address] #:  [id number]  Authorization Number: [id number]    Admit date: 12/18/20  Admit time: 1002    Admitting Physician: Rossi Brunner MD  Attending Physician:  Bharath Hinojosa noted to have decreased, no redness/ no tenderness.   Integument: L groin crease with some redness/satelite lesions that appear to have improved from yesterday     Rectal cancer ([facility] [address]  POD#19 s/p laparoscopic LAR (taTME) with diverting loop ileostomy     St (Right Lower Back) Joan Nascimento RN      lidocaine-menthol 4-1 % 1 patch     Date Action Dose Route User    1/6/2021 9546 Patch Applied 1 patch Transdermal (Right Leg) Joan Nascimento RN      Metoclopramide HCl (REGLAN) injection 10 mg     Date Action

## 2021-01-06 NOTE — DIETARY NOTE
Brief Nutrition Note:    Calorie count results from L and D 1/5 and B 1/6. Pt ate approx 1555 calories with 70 g protein meeting 90% min calorie and 100% protein needs. Called surgical PA. Plan to discontinue TPN after current bag runs out.       NUTRITION

## 2021-01-06 NOTE — PROGRESS NOTES
[facility] Hospitalist Progress Note     PCP: Ximena Rivera DO    CC: Follow up       Assessment/Plan:     Ms. Rosa Munguia is a 77 yo Female with PMHx of rectal CA, anxiety, HTN, who presented for LAR. Course complicated by N/V with imaging with SBO vs ileus.  D switch LR to NS   - Na stable     FEN  -soft diet, TPN    DVT Prophylaxis: HSQ    Dispo: pending clinical course    Brenden Velazquez MD  [facility] Hospitalist   [facility] Answering service [phone]    Subjective     Feels a little better.  Tolerating small amounts simethicone, Calcium Carbonate Antacid, Normal Saline Flush, HYDROmorphone HCl **OR** HYDROmorphone HCl **OR** HYDROmorphone HCl, PEG 3350, magnesium hydroxide, ondansetron HCl    Data Review:       Labs:     Recent Labs   Lab 01/01/21  1208 01/02/21  0752 AM     Finalized by (CST): Alexis De Souza MD on 1/02/2021 at 10:17 AM          Ct Abdomen+pelvis(contrast Only)(cpt=74177)    Result Date: 1/3/2021  CONCLUSION:   Gastritis involving the gastric pylorus and antrum with duodenitis involving the 1st port further evaluate. 2. Status post recent laparoscopic low anterior resection for rectal cancer with a diverting ileostomy. Decompressed colon. Apparent left hemicolectomy.  3. Contracted gallbladder with enhancement of the wall which may be related to the obstruction. There is less air noted throughout large and small bowel when compared to previous study. Moderate fluid and debris-filled distention of the stomach. Right lower quadrant ostomy with contrast.  Contrast is seen in the ostomy bag.   Lower abd

## 2021-01-06 NOTE — WOUND PROGRESS NOTE
Wound and [address] with the nurse and the pt's brother in law, can come by 2 pm. I am here to continue teaching the pt., she has been emptying her pouch. 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  She has changed and practiced catalan

## 2021-01-07 VITALS
OXYGEN SATURATION: 98 % | SYSTOLIC BLOOD PRESSURE: 126 MMHG | WEIGHT: 158 LBS | BODY MASS INDEX: 31.43 KG/M2 | TEMPERATURE: 99 F | HEART RATE: 111 BPM | RESPIRATION RATE: 18 BRPM | HEIGHT: 59.5 IN | DIASTOLIC BLOOD PRESSURE: 61 MMHG

## 2021-01-07 LAB
ANION GAP SERPL CALC-SCNC: 5 MMOL/L (ref 0–18)
BASOPHILS # BLD AUTO: 0.03 X10(3) UL (ref 0–0.2)
BASOPHILS NFR BLD AUTO: 0.7 %
BUN BLD-MCNC: 13 MG/DL (ref 7–18)
BUN/CREAT SERPL: 22.4 (ref 10–20)
CALCIUM BLD-MCNC: 8.4 MG/DL (ref 8.5–10.1)
CHLORIDE SERPL-SCNC: 105 MMOL/L (ref 98–112)
CO2 SERPL-SCNC: 25 MMOL/L (ref 21–32)
CREAT BLD-MCNC: 0.58 MG/DL
DEPRECATED RDW RBC AUTO: 46.9 FL (ref 35.1–46.3)
EOSINOPHIL # BLD AUTO: 0.14 X10(3) UL (ref 0–0.7)
EOSINOPHIL NFR BLD AUTO: 3.1 %
ERYTHROCYTE [DISTWIDTH] IN BLOOD BY AUTOMATED COUNT: 13.2 % (ref 11–15)
GLUCOSE BLD-MCNC: 100 MG/DL (ref 70–99)
H PYLORI AG STL QL IA: NEGATIVE
HCT VFR BLD AUTO: 27.1 %
HGB BLD-MCNC: 8.7 G/DL
IMM GRANULOCYTES # BLD AUTO: 0.05 X10(3) UL (ref 0–1)
IMM GRANULOCYTES NFR BLD: 1.1 %
LYMPHOCYTES # BLD AUTO: 0.37 X10(3) UL (ref 1–4)
LYMPHOCYTES NFR BLD AUTO: 8.2 %
MCH RBC QN AUTO: 31.1 PG (ref 26–34)
MCHC RBC AUTO-ENTMCNC: 32.1 G/DL (ref 31–37)
MCV RBC AUTO: 96.8 FL
MONOCYTES # BLD AUTO: 0.5 X10(3) UL (ref 0.1–1)
MONOCYTES NFR BLD AUTO: 11.1 %
NEUTROPHILS # BLD AUTO: 3.41 X10 (3) UL (ref 1.5–7.7)
NEUTROPHILS # BLD AUTO: 3.41 X10(3) UL (ref 1.5–7.7)
NEUTROPHILS NFR BLD AUTO: 75.8 %
OSMOLALITY SERPL CALC.SUM OF ELEC: 280 MOSM/KG (ref 275–295)
PLATELET # BLD AUTO: 225 10(3)UL (ref 150–450)
POTASSIUM SERPL-SCNC: 4.1 MMOL/L (ref 3.5–5.1)
RBC # BLD AUTO: 2.8 X10(6)UL
SODIUM SERPL-SCNC: 135 MMOL/L (ref 136–145)
WBC # BLD AUTO: 4.5 X10(3) UL (ref 4–11)

## 2021-01-07 PROCEDURE — C9113 INJ PANTOPRAZOLE SODIUM, VIA: HCPCS | Performed by: INTERNAL MEDICINE

## 2021-01-07 PROCEDURE — 99214 OFFICE O/P EST MOD 30 MIN: CPT

## 2021-01-07 PROCEDURE — 85025 COMPLETE CBC W/AUTO DIFF WBC: CPT | Performed by: PHYSICIAN ASSISTANT

## 2021-01-07 PROCEDURE — 97535 SELF CARE MNGMENT TRAINING: CPT

## 2021-01-07 PROCEDURE — 36592 COLLECT BLOOD FROM PICC: CPT

## 2021-01-07 PROCEDURE — 80048 BASIC METABOLIC PNL TOTAL CA: CPT | Performed by: SURGERY

## 2021-01-07 RX ORDER — TAMSULOSIN HYDROCHLORIDE 0.4 MG/1
0.4 CAPSULE ORAL DAILY
Qty: 30 CAPSULE | Refills: 0 | Status: SHIPPED | OUTPATIENT
Start: 2021-01-08 | End: 2021-01-07

## 2021-01-07 RX ORDER — TAMSULOSIN HYDROCHLORIDE 0.4 MG/1
0.4 CAPSULE ORAL DAILY
Qty: 30 CAPSULE | Refills: 0 | Status: SHIPPED | OUTPATIENT
Start: 2021-01-08 | End: 2021-02-07

## 2021-01-07 RX ORDER — PANTOPRAZOLE SODIUM 40 MG/1
40 TABLET, DELAYED RELEASE ORAL
Qty: 60 TABLET | Refills: 0 | Status: SHIPPED | OUTPATIENT
Start: 2021-01-07 | End: 2021-01-12

## 2021-01-07 RX ORDER — METRONIDAZOLE 500 MG/1
500 TABLET ORAL 3 TIMES DAILY
Qty: 15 TABLET | Refills: 0 | Status: SHIPPED | OUTPATIENT
Start: 2021-01-07 | End: 2021-01-07

## 2021-01-07 RX ORDER — TRAMADOL HYDROCHLORIDE 50 MG/1
50 TABLET ORAL EVERY 6 HOURS PRN
Qty: 10 TABLET | Refills: 0 | Status: SHIPPED | OUTPATIENT
Start: 2021-01-07 | End: 2021-01-12

## 2021-01-07 RX ORDER — PANTOPRAZOLE SODIUM 40 MG/1
40 TABLET, DELAYED RELEASE ORAL DAILY
Qty: 60 TABLET | Refills: 0 | Status: SHIPPED | OUTPATIENT
Start: 2021-01-07 | End: 2021-01-07

## 2021-01-07 RX ORDER — CIPROFLOXACIN 500 MG/1
500 TABLET, FILM COATED ORAL 2 TIMES DAILY
Qty: 10 TABLET | Refills: 0 | Status: SHIPPED | OUTPATIENT
Start: 2021-01-07 | End: 2021-01-12

## 2021-01-07 RX ORDER — METRONIDAZOLE 500 MG/1
500 TABLET ORAL 3 TIMES DAILY
Qty: 15 TABLET | Refills: 0 | Status: SHIPPED | OUTPATIENT
Start: 2021-01-07 | End: 2021-01-12

## 2021-01-07 RX ORDER — PANTOPRAZOLE SODIUM 40 MG/1
40 TABLET, DELAYED RELEASE ORAL
Qty: 60 TABLET | Refills: 1 | Status: SHIPPED | OUTPATIENT
Start: 2021-01-07 | End: 2021-01-07

## 2021-01-07 NOTE — PROGRESS NOTES
[place] [facility] - [facility]    Progress Note    Vicjules Ranks Patient Status:  Inpatient    3/4/1945 MRN [id number]   Location [facility] 4W/SW/SE Attending Roseanne Palencia MD   Hosp Day # 21 PCP Paco Gibson DO        Subjective:    Omega Pillai So (ZOSYN) 3.375 g in dextrose 5 % 100 mL ADD-vantage, 3.375 g, Intravenous, Q8H    •  [COMPLETED] iopamidol (ISOVUE-M) 76 % injection 100 mL, 100 mL, Intravenous, ONCE PRN    •  [COMPLETED] potassium chloride 40 mEq in sodium chloride 0.9% 250 mL IVPB, 40 Saline Flush 0.9 % injection 10 mL, 10 mL, Intravenous, PRN    •  Heparin Sodium (Porcine) 5000 UNIT/ML injection 5,000 Units, 5,000 Units, Subcutaneous, Q8H [facility]    •  HYDROmorphone HCl (DILAUDID) 1 MG/ML injection 0.2 mg, 0.2 mg, Intravenous, Q2H PRN    O Psychiatric: Appropriate mood and affect.         Assessment and Plan:   Rectal cancer ([facility] [address]  POD#20 s/p laparoscopic LAR (taTME) with diverting loop ileostomy    Stoma output continues and is improved, now with some rectal drainage of mucous/passage of s Cipro, flagyl, Protonix, tramadol and Flomax sent to preferred pharmacy. Follow up with Dr. Stephen Morgan 1-2 weeks.       Yoselyn Clement PA-C  01/06/21  12:13 PM     D/w and seen w/ Dr. Stephen Morgan         Results:     Lab Results   Component Value Date    WBC 4.5 01/

## 2021-01-07 NOTE — OCCUPATIONAL THERAPY NOTE
OCCUPATIONAL THERAPY TREATMENT NOTE - INPATIENT        Room Number: 895/795-L           Presenting Problem: (s/p colon resection & ileostomy due to rectal CA )    Problem List  Active Problems:    Rectal cancer ([place]    Preop testing      OCCUPATIONAL THER SATURATIONS                ACTIVITIES OF DAILY LIVING ASSESSMENT  AM-PAC ‘6-Clicks’ Inpatient Daily Activity Short Form  How much help from another person does the patient currently need…  -   Putting on and taking off regular lower body clothing?: JOSE Koenig          Goals  on: 21  Frequency: 3-5x/week

## 2021-01-07 NOTE — PHYSICAL THERAPY NOTE
PHYSICAL THERAPY TREATMENT NOTE - INPATIENT     Room Number: 163/017-H       Presenting Problem: rectal cancer s/p colon resection and ileostomy    Problem List  Active Problems:    Rectal cancer ([facility] [address]    Preop testing      PHYSICAL THERAPY ASSESSMENT adjusting bedclothes, sheets and blankets)?: None   -   Sitting down on and standing up from a chair with arms (e.g., wheelchair, bedside commode, etc.): A Little   -   Moving from lying on back to sitting on the side of the bed?: A Little   How much help Goal #5   Current Status IN PROGRESS   Goal #6     Goal #6  Current Status

## 2021-01-07 NOTE — PLAN OF CARE
Pt is alert/oriented. Vitals stable. Tolerating low fiber soft diet. Ambulating well with walker. No complaints of pain today. Home with oral antibiotics. Voiding adequately. Pt doing ostomy care/emptying independently.  Discharging home with family, home h pain and request assistance  - Assess pain using appropriate pain scale  - Administer analgesics based on type and severity of pain and evaluate response  - Implement non-pharmacological measures as appropriate and evaluate response  - Consider cultural an adhesives  Outcome: Adequate for Discharge  Goal: Incision/wound heals without complications  Description: Interventions:  - Assess wound bed/incision and surrounding skin tissue  - Collaborate with physician/APN and implement wound/incision site care and

## 2021-01-07 NOTE — PLAN OF CARE
Problem: Patient Centered Care  Goal: Patient preferences are identified and integrated in the patient's plan of care  Description: Interventions:  - What would you like us to know as we care for you?  I live at home with my sister  - Provide timely, comp as needed  - Minimize the use of adhesives  Outcome: Progressing  Goal: Incision/wound heals without complications  Description: Interventions:  - Assess wound bed/incision and surrounding skin tissue  - Collaborate with physician/APN and implement wound/i severity of pain and evaluate response  - Implement non-pharmacological measures as appropriate and evaluate response  - Consider cultural and social influences on pain and pain management  - Manage/alleviate anxiety  - Utilize distraction and/or relaxatio

## 2021-01-07 NOTE — DIETARY NOTE
ADULT NUTRITION REASSESSMENT     Pt is at moderate nutrition risk due to TPN start. Pt does not meet malnutrition criteria.       RECOMMENDATIONS TO MD:  See Nutrition Intervention      NUTRITION DIAGNOSIS/PROBLEM:  Inadequate oral intake related to [facility] Update 1/7/21:  Met with patient. She stated she is very hungry, had breakfast & ready to order lunch. Possible discharge today. Answered questions related to diet at home.     NUTRITION INTERVENTION:    NUTRITION PRESCRIPTION:  Estimated Nutritional Hola Jaramillo ileostomy with output. Yeison Thayer       FOOD/NUTRITION RELATED HISTORY:  Appetite: Fair  Intake:    Percent Meals Eaten (last 3 days)     Date/Time Percent Meals Eaten (%)    01/04/21 0901  0 %    01/04/21 1438  75 %    01/04/21 1813  75 %    01/05/21 0945  0 %

## 2021-01-07 NOTE — WOUND PROGRESS NOTE
Wound and Ostomy Care Services  Follow up on the pt., she is up in the chair at the bedside. No family here at this time. She is tolerating her current diet and drinking her Glucerna. The pt. has been emptying her pouch and doing a good job.  We reviewed th

## 2021-01-07 NOTE — PAYOR COMM NOTE
--------------  CONTINUED STAY REVIEW    Payor: [address] [place] [address] #:  [id number]  Authorization Number: [id number]    Admit date: 12/18/20  Admit time: 4317    Admitting Physician: Justo Dunn MD  Attending Physician:  Charmaine Sanders rate and rhythm. Abdomen: Soft, mildly tender RUQ/flank, minimally distended. Positive bowel sounds.  No rebound tenderness  Stoma: viable, less edematous, green liquid ouput with some particles   Incision(s): C/D/I x 4  Drain: serous - removed 1/5  Rosalino [id number] UNIT/GM cream     Date Action Dose Route User    1/7/2021 0940 Given (none) Topical Jesica LATRELL Spring    1/6/2021 2101 Given (none) Topical Charo Mullins, LATRELL      Pantoprazole Sodium (PROTONIX) 40 mg in Sodium Chloride (PF) 0.9 % 10 mL IV push

## 2021-01-07 NOTE — CONSULTS
[place] [facility] - [facility]    Report of Consultation    Eddye Hare Patient Status:  Inpatient    3/4/1945 MRN [id number]   Location [facility] 4W/SW/SE Attending Charu Montana MD   [facility] Day # 23 PCP Media Bio, DO     Date of Admission: 12/18/2020    Performed by Stew rAias MD at [facility] OR   • [address] - LEFT N/A 12/18/2020    Performed by Stew Arias MD at [facility] OR   • OTHER  11/2020    exploration of rectal tumor   • OTHER      cyst in vagina - benign   • TRA acetaminophen (TYLENOL) tab 650 mg, 650 mg, Oral, Q6H PRN    •  tamsulosin HCl (FLOMAX) cap 0.4 mg, 0.4 mg, Oral, Daily    •  simethicone (MYLICON) chewable tab 80 mg, 80 mg, Oral, QID PRN    •  sodium chloride 0.9% IV bolus 500 mL, 500 mL, Intravenous, On 127/60, pulse 110, temperature 98.6 °F (37 °C), temperature source Oral, resp. rate 18, height 4' 11.5\" (1.511 m), weight 158 lb (71.7 kg), SpO2 98 %, not currently breastfeeding.   GENERAL: well developed, in no apparent distress  SKIN: no rashes,no suspi LAR with ileostomy on 12/18/20.     2.  Abnormal imaging - duodenitis with ? Duodenal ulcer vs duodenal diverticulum  -clinically she seems improved. -likely some significant gastritis with possible duodenal ulceration. Improved.   Cannot rule out hpylor

## 2021-01-08 NOTE — CM/SW NOTE
The pt. Was discharged yesterday 1/7. SW notified [facility] via Aidin of the pt's discharge.       Ck Galindo, [facility] ext [phone]

## 2021-01-13 NOTE — DISCHARGE SUMMARY
General Medicine Discharge Summary     Patient ID:  Zelda Moreno  76year old  3/4/1945    Admit date: 12/18/2020    Discharge date and time: 01/7/21    Attending Physician: No att. providers found     Primary Care Physician: Noemi Ley LAR  - PRN pain meds, Transition to PO as tolerated   - Bowel reg, antiemetics, simethicone prn  - pt with nausea and vomiting, mild ileus likely, per surgery FLD as tolerated, pt instructe to take it slow  - reglan scheduled   - ostomy teaching  - anne-marie FLAGYL  Take 1 tablet (500 mg total) by mouth 3 (three) times daily for 5 days.   What changed:   · how much to take  · how to take this  · when to take this  · additional instructions        CONTINUE taking these medications    [facility] AREDS 2 Caps [phone]  Fax: [phone]    Discharge Laparoscopic Bowel Resection    Diet:    Low Residue Diet (low fiber diet) for 6 weeks, then gradually increase your diet to a healthy high- fiber diet.   If you develop nausea, stick to liquids until the nausea go seals). 3. Empty and record drains 1-2 times a day (or more as necessary). 4. Bring log of drains to your follow up appointment. 5. If you have more than one drain, please record the drainage for each drain separately (ie.  Right drain, left drain, larissa avoid fluid and electrolyte imbalance   1) water does not have electrolytes, the body needs regular sodium and potassium intake   - good sources of sodium are soup broths and pretzels or potato chips   - good sources of potassium are tomato or vegetable ju

## 2021-02-01 ENCOUNTER — LAB ENCOUNTER (OUTPATIENT)
Dept: LAB | Age: 76
End: 2021-02-01
Attending: INTERNAL MEDICINE
Payer: MEDICARE

## 2021-02-01 DIAGNOSIS — Z01.818 PRE-OP TESTING: ICD-10-CM

## 2021-02-02 LAB — SARS-COV-2 RNA RESP QL NAA+PROBE: NOT DETECTED

## 2021-02-02 NOTE — PROGRESS NOTES
You will be happy to know that your COVID 19 test returned NEGATIVE. If you need any further assistance, please feel free to call [phone]. Thank you for letting us care for you.     Best regards,   Yarelis Juarez MD  [address] [facility]

## 2021-02-04 ENCOUNTER — HOSPITAL ENCOUNTER (OUTPATIENT)
Facility: HOSPITAL | Age: 76
Setting detail: HOSPITAL OUTPATIENT SURGERY
Discharge: HOME | End: 2021-02-04
Attending: INTERNAL MEDICINE | Admitting: INTERNAL MEDICINE
Payer: MEDICARE

## 2021-02-04 ENCOUNTER — ANESTHESIA EVENT (OUTPATIENT)
Dept: ENDOSCOPY | Facility: HOSPITAL | Age: 76
End: 2021-02-04
Payer: MEDICARE

## 2021-02-04 ENCOUNTER — ANESTHESIA (OUTPATIENT)
Dept: ENDOSCOPY | Facility: HOSPITAL | Age: 76
End: 2021-02-04
Payer: MEDICARE

## 2021-02-04 DIAGNOSIS — R93.3 ABNORMAL CT SCAN, GASTROINTESTINAL TRACT: ICD-10-CM

## 2021-02-04 DIAGNOSIS — Z01.818 PRE-OP TESTING: Primary | ICD-10-CM

## 2021-02-04 PROCEDURE — 88342 IMHCHEM/IMCYTCHM 1ST ANTB: CPT | Performed by: INTERNAL MEDICINE

## 2021-02-04 PROCEDURE — 88305 TISSUE EXAM BY PATHOLOGIST: CPT | Performed by: INTERNAL MEDICINE

## 2021-02-04 PROCEDURE — 88312 SPECIAL STAINS GROUP 1: CPT | Performed by: INTERNAL MEDICINE

## 2021-02-04 PROCEDURE — 0DB68ZX EXCISION OF STOMACH, VIA NATURAL OR ARTIFICIAL OPENING ENDOSCOPIC, DIAGNOSTIC: ICD-10-PCS | Performed by: INTERNAL MEDICINE

## 2021-02-04 RX ORDER — SODIUM CHLORIDE 0.9 % (FLUSH) 0.9 %
10 SYRINGE (ML) INJECTION AS NEEDED
Status: DISCONTINUED | OUTPATIENT
Start: 2021-02-04 | End: 2021-02-04

## 2021-02-04 RX ORDER — SODIUM CHLORIDE, SODIUM LACTATE, POTASSIUM CHLORIDE, CALCIUM CHLORIDE 600; 310; 30; 20 MG/100ML; MG/100ML; MG/100ML; MG/100ML
INJECTION, SOLUTION INTRAVENOUS CONTINUOUS
Status: DISCONTINUED | OUTPATIENT
Start: 2021-02-04 | End: 2021-02-04

## 2021-02-04 RX ORDER — NALOXONE HYDROCHLORIDE 0.4 MG/ML
80 INJECTION, SOLUTION INTRAMUSCULAR; INTRAVENOUS; SUBCUTANEOUS AS NEEDED
Status: DISCONTINUED | OUTPATIENT
Start: 2021-02-04 | End: 2021-02-04

## 2021-02-04 RX ORDER — LIDOCAINE HYDROCHLORIDE 10 MG/ML
INJECTION, SOLUTION EPIDURAL; INFILTRATION; INTRACAUDAL; PERINEURAL AS NEEDED
Status: DISCONTINUED | OUTPATIENT
Start: 2021-02-04 | End: 2021-02-04 | Stop reason: SURG

## 2021-02-04 RX ORDER — MIDAZOLAM HYDROCHLORIDE 1 MG/ML
1 INJECTION INTRAMUSCULAR; INTRAVENOUS EVERY 5 MIN PRN
Status: DISCONTINUED | OUTPATIENT
Start: 2021-02-04 | End: 2021-02-04

## 2021-02-04 RX ADMIN — SODIUM CHLORIDE, SODIUM LACTATE, POTASSIUM CHLORIDE, CALCIUM CHLORIDE: 600; 310; 30; 20 INJECTION, SOLUTION INTRAVENOUS at 13:54:00

## 2021-02-04 RX ADMIN — LIDOCAINE HYDROCHLORIDE 50 MG: 10 INJECTION, SOLUTION EPIDURAL; INFILTRATION; INTRACAUDAL; PERINEURAL at 13:37:00

## 2021-02-04 NOTE — OPERATIVE REPORT
EGD Operative Report    Savannah Cooper Patient Status:  [facility]    3/4/1945 MRN [id number] was transferred to the recovery area in stable condition. Findings:    ESOPHAGUS:  Normal esophagus. The Z-line and GE junction noted at 35cms from the incisors and was normal.  There was a small 1-2 cm sliding hiatal hernia noted.     STOMACH:  Mild antonia

## 2021-02-04 NOTE — ANESTHESIA POSTPROCEDURE EVALUATION
Patient: Sachin Hill    Procedure Summary     Date: 02/04/21 Room / Location: [facility] ENDOSCOPY 04 / [facility] ENDOSCOPY    Anesthesia Start: 4901 Anesthesia Stop: 4793    Procedure: ESOPHAGOGASTRODUODENOSCOPY (EGD) (N/A ) Diagnosis:       Abnormal CT scan, antonia

## 2021-02-04 NOTE — ANESTHESIA PREPROCEDURE EVALUATION
Anesthesia PreOp Note    HPI:     Florencia Carolina is a 76year old female who presents for preoperative consultation requested by: Caitlyn Casas MD    Date of Surgery: 2/4/2021    Procedure(s):  ESOPHAGOGASTRODUODENOSCOPY (EGD)  Indication: Abnormal CT bladder)    • Osteoarthritis     lower back, neck   • Personal history of antineoplastic chemotherapy     last chemo 10/20   • Retinitis pigmentosa        Past Surgical History:   Procedure Laterality Date   • BREAST BIOPSY  late 1990's    benign   • COLEC NAUSEA ONLY  Adhesive Tape           OTHER (SEE COMMENTS)    Comment:If on too long causes redness  Codeine                 UNKNOWN    Comment:Dizziness, nausea/vomiting  Shellfish-Derived P*    UNKNOWN    Family History   Problem Relation Age of On Physically abused: Not on file        Forced sexual activity: Not on file    Other Topics      Concerns:        Not on file    Social History Narrative      Not on file      Available pre-op labs reviewed.   Lab Results   Component Value Date    WBC 4.5 01/ alternative forms of anesthetic management. All of the patient's questions were answered to the best of my ability. The patient desires the anesthetic management as planned.   Aron Meckel Raval  2/4/2021 1:22 PM

## 2021-02-04 NOTE — H&P
History and Physical for Endoscopic Procedure      Alli Colon Patient Status:  [facility] Outpatient Surgery    3/4/1945 MRN [id number]   Location [facility] ENDOSCOPY LAB SUITES Attending Bakari Boles MD   Hosp Day # 0 PCP Tacho Dawkins tumor   • OTHER      cyst in vagina - benign   • TRANSANAL TOTAL MESORECTAL EXCISION N/A 12/18/2020    Performed by Dwayne Malik MD at [facility] MAIN OR     Family History   Problem Relation Age of Onset   • Heart Disease Mother    • Hypertension Mother    • Art cataract of both eyes, unspecified age-related cataract type     Chronic constipation     Spondylosis of cervical region without myelopathy or radiculopathy     Rectal cancer (HCC)     Pancytopenia (HCC)     Thrombocytopenia (HCC)     Elevated liver enzyme

## 2021-02-05 VITALS
RESPIRATION RATE: 16 BRPM | OXYGEN SATURATION: 98 % | SYSTOLIC BLOOD PRESSURE: 135 MMHG | WEIGHT: 160 LBS | TEMPERATURE: 97 F | DIASTOLIC BLOOD PRESSURE: 74 MMHG | HEART RATE: 78 BPM | HEIGHT: 57 IN | BODY MASS INDEX: 34.52 KG/M2

## 2021-02-06 NOTE — PROGRESS NOTES
2/6/2021  [facility] [id number]    Dear Mila Sullivan,       Here are the biopsy/pathology findings from your recent EGD (Upper  Endoscopy):    a negative test for a bacteria called H-Pylori    Follow-up information:    Please ta

## 2021-04-09 ENCOUNTER — OFFICE VISIT (OUTPATIENT)
Dept: WOUND CARE | Facility: HOSPITAL | Age: 76
End: 2021-04-09
Attending: CLINICAL NURSE SPECIALIST
Payer: MEDICARE

## 2021-04-09 DIAGNOSIS — Z71.89 OSTOMY NURSE CONSULTATION: Primary | ICD-10-CM

## 2021-04-09 DIAGNOSIS — C20 RECTAL CANCER (HCC): ICD-10-CM

## 2021-04-09 DIAGNOSIS — Z93.2 ILEOSTOMY STATUS (HCC): ICD-10-CM

## 2021-04-09 PROCEDURE — 99213 OFFICE O/P EST LOW 20 MIN: CPT

## 2021-04-09 NOTE — PROGRESS NOTES
Subjective    Chief Complaint  This information was obtained from the patient  The patient was seen today for follow up and management of difficult to manage ileostomy. Patient is here with her brother in law.     Allergies  Novocain (Reaction: anxiety,dizz information was obtained from the patient  Patient has a medical history of:  rectal cancer  back problem  Blind-retinitis pigmentosa  Hearing impairment  Hypertension  Overactive bladder  Osteoarthristis lower back,neck    Surgical History  This informati Ileostomy status  (Encounter Diagnosis) Z71.89 - Other specified counseling    Diagnoses    ICD-10  C20: Malignant neoplasm of rectum  Z93.2: Ileostomy status  Z71.89:  Other specified counseling        Patient presents to the outpatient wound clinic with f

## 2021-04-12 ENCOUNTER — HOSPITAL ENCOUNTER (EMERGENCY)
Facility: HOSPITAL | Age: 76
Discharge: HOME | End: 2021-04-12
Attending: EMERGENCY MEDICINE
Payer: MEDICARE

## 2021-04-12 ENCOUNTER — APPOINTMENT (OUTPATIENT)
Dept: CT IMAGING | Facility: HOSPITAL | Age: 76
End: 2021-04-12
Attending: EMERGENCY MEDICINE
Payer: MEDICARE

## 2021-04-12 VITALS
RESPIRATION RATE: 18 BRPM | TEMPERATURE: 98 F | BODY MASS INDEX: 30.24 KG/M2 | DIASTOLIC BLOOD PRESSURE: 53 MMHG | OXYGEN SATURATION: 97 % | HEART RATE: 75 BPM | HEIGHT: 59 IN | WEIGHT: 150 LBS | SYSTOLIC BLOOD PRESSURE: 126 MMHG

## 2021-04-12 DIAGNOSIS — K43.5 PARASTOMAL HERNIA WITHOUT OBSTRUCTION OR GANGRENE: Primary | ICD-10-CM

## 2021-04-12 PROCEDURE — 85025 COMPLETE CBC W/AUTO DIFF WBC: CPT | Performed by: EMERGENCY MEDICINE

## 2021-04-12 PROCEDURE — 96374 THER/PROPH/DIAG INJ IV PUSH: CPT

## 2021-04-12 PROCEDURE — 80076 HEPATIC FUNCTION PANEL: CPT | Performed by: EMERGENCY MEDICINE

## 2021-04-12 PROCEDURE — S0028 INJECTION, FAMOTIDINE, 20 MG: HCPCS | Performed by: EMERGENCY MEDICINE

## 2021-04-12 PROCEDURE — 99285 EMERGENCY DEPT VISIT HI MDM: CPT

## 2021-04-12 PROCEDURE — 81001 URINALYSIS AUTO W/SCOPE: CPT | Performed by: EMERGENCY MEDICINE

## 2021-04-12 PROCEDURE — 83690 ASSAY OF LIPASE: CPT | Performed by: EMERGENCY MEDICINE

## 2021-04-12 PROCEDURE — 85025 COMPLETE CBC W/AUTO DIFF WBC: CPT

## 2021-04-12 PROCEDURE — 74177 CT ABD & PELVIS W/CONTRAST: CPT | Performed by: EMERGENCY MEDICINE

## 2021-04-12 PROCEDURE — 80048 BASIC METABOLIC PNL TOTAL CA: CPT

## 2021-04-12 PROCEDURE — 80048 BASIC METABOLIC PNL TOTAL CA: CPT | Performed by: EMERGENCY MEDICINE

## 2021-04-12 RX ORDER — FAMOTIDINE 10 MG/ML
20 INJECTION, SOLUTION INTRAVENOUS ONCE
Status: COMPLETED | OUTPATIENT
Start: 2021-04-12 | End: 2021-04-12

## 2021-04-12 RX ORDER — SUCRALFATE ORAL 1 G/10ML
1 SUSPENSION ORAL
Qty: 1 BOTTLE | Refills: 0 | Status: SHIPPED | OUTPATIENT
Start: 2021-04-12 | End: 2021-04-28

## 2021-04-12 NOTE — ED INITIAL ASSESSMENT (HPI)
Pt c/o generalized abd pain x1wk  Denies n/v. Pt notes that she has an ileostomy, states that it is still draining. Pt on chemo.

## 2021-04-12 NOTE — ED PROVIDER NOTES
Patient Seen in: [facility] AND [facility] Emergency Department      History   Patient presents with:  Abdomen/Flank Pain    Stated Complaint:     HPI/Subjective:   HPI    49-year-old female with rectal cancer status post low anterior resection of the rectum wit Performed by Joann Shukla MD at [facility] OR   • OTHER  11/2020    exploration of rectal tumor   • OTHER      cyst in vagina - benign   • TRANSANAL TOTAL MESORECTAL EXCISION N/A 12/18/2020    Performed by Joann Shukla MD at [address] [place] [address] nondistended. Musculoskeletal:         General: Normal range of motion. Cervical back: Normal range of motion and neck supple. No tenderness. Skin:     General: Skin is warm. Capillary Refill: Capillary refill takes less than 2 seconds.    Rosalino ---------                               -----------         ------                     CBC W/ DIFFERENTIAL[[id number]]          Abnormal            Final result                 Please view results for these tests on the individual orders encounter diagnosis)     Disposition:  Discharge  4/12/2021  6:50 pm    Follow-up:  Maximiliano Sorto MD  [address]  [phone]          We recommend that you schedule follow up care with a primary care provider within the

## 2021-04-20 ENCOUNTER — LAB ENCOUNTER (OUTPATIENT)
Dept: LAB | Facility: HOSPITAL | Age: 76
End: 2021-04-20
Attending: INTERNAL MEDICINE
Payer: MEDICARE

## 2021-04-20 DIAGNOSIS — Z01.818 PREOPERATIVE TESTING: ICD-10-CM

## 2021-04-21 NOTE — PROGRESS NOTES
You will be happy to know that your COVID 19 test returned NEGATIVE. If you need any further assistance, please feel free to call [phone]. Thank you for letting us care for you.     Best regards,   Joann Diaz MD  [address]

## 2021-04-22 ENCOUNTER — HOSPITAL ENCOUNTER (OUTPATIENT)
Facility: HOSPITAL | Age: 76
Setting detail: HOSPITAL OUTPATIENT SURGERY
Discharge: HOME | End: 2021-04-22
Attending: INTERNAL MEDICINE | Admitting: INTERNAL MEDICINE
Payer: MEDICARE

## 2021-04-22 VITALS
OXYGEN SATURATION: 97 % | SYSTOLIC BLOOD PRESSURE: 132 MMHG | HEIGHT: 59 IN | WEIGHT: 154 LBS | HEART RATE: 80 BPM | BODY MASS INDEX: 31.04 KG/M2 | DIASTOLIC BLOOD PRESSURE: 55 MMHG | RESPIRATION RATE: 16 BRPM

## 2021-04-22 DIAGNOSIS — Z01.818 PREOPERATIVE TESTING: Primary | ICD-10-CM

## 2021-04-22 DIAGNOSIS — Z98.890 HX OF ILEOSTOMY: ICD-10-CM

## 2021-04-22 PROCEDURE — 99152 MOD SED SAME PHYS/QHP 5/>YRS: CPT | Performed by: INTERNAL MEDICINE

## 2021-04-22 PROCEDURE — 99153 MOD SED SAME PHYS/QHP EA: CPT | Performed by: INTERNAL MEDICINE

## 2021-04-22 PROCEDURE — 0DJD8ZZ INSPECTION OF LOWER INTESTINAL TRACT, VIA NATURAL OR ARTIFICIAL OPENING ENDOSCOPIC: ICD-10-PCS | Performed by: INTERNAL MEDICINE

## 2021-04-22 RX ORDER — MIDAZOLAM HYDROCHLORIDE 1 MG/ML
INJECTION INTRAMUSCULAR; INTRAVENOUS
Status: DISCONTINUED | OUTPATIENT
Start: 2021-04-22 | End: 2021-04-22

## 2021-04-22 RX ORDER — SODIUM CHLORIDE, SODIUM LACTATE, POTASSIUM CHLORIDE, CALCIUM CHLORIDE 600; 310; 30; 20 MG/100ML; MG/100ML; MG/100ML; MG/100ML
INJECTION, SOLUTION INTRAVENOUS CONTINUOUS
Status: DISCONTINUED | OUTPATIENT
Start: 2021-04-22 | End: 2021-04-22

## 2021-04-22 NOTE — H&P
History and Physical for Endoscopic Procedure      Huey Settler Patient Status:  Hospital Outpatient Surgery    3/4/1945 MRN [id number]   Location [facility] ENDOSCOPY LAB SUITES Attending Malinda Hauser MD   Hosp Day # 0 NENITA Cruz Neg       reports that she has never smoked. She has never used smokeless tobacco. She reports that she does not drink alcohol and does not use drugs.     Allergies:    Novocain                ANXIETY, DIZZINESS, PALPITATIONS  Tetracycline            NAUSEA

## 2021-04-22 NOTE — OPERATIVE REPORT
Ileoscopy Operative Report    Nithin Tee Patient Status:  [facility] Outpatient Surgery    3/4/1945 MRN [id number]   Location [facility] ENDOSCOPY LAB SUITES Attending Lokesh Rodriguez MD   Hosp Day #   0 PCP Holley Wood DO     Pre-O transferred to the recovery area in stable condition. Quality of Preparation: Adequate  Aronchick Bowel Prep Scale:  Good  Findings:   1. Mild stomatitis with narrowed stoma. Able to pass EGD scope with some moderate resistance.    2.  The ileum had a mi

## 2021-06-08 PROBLEM — L27.1 HAND FOOT SYNDROME: Status: ACTIVE | Noted: 2021-06-08

## 2021-08-19 PROBLEM — N32.81 OAB (OVERACTIVE BLADDER): Status: RESOLVED | Noted: 2020-03-05 | Resolved: 2021-08-19

## 2021-08-19 PROBLEM — Z71.89 OSTOMY NURSE CONSULTATION: Status: RESOLVED | Noted: 2021-04-09 | Resolved: 2021-08-19

## 2021-08-19 PROBLEM — K59.09 CHRONIC CONSTIPATION: Status: RESOLVED | Noted: 2020-03-12 | Resolved: 2021-08-19

## 2021-08-19 PROBLEM — H25.9 AGE-RELATED CATARACT OF BOTH EYES, UNSPECIFIED AGE-RELATED CATARACT TYPE: Status: RESOLVED | Noted: 2020-03-12 | Resolved: 2021-08-19

## 2021-08-19 PROBLEM — L27.1 HAND FOOT SYNDROME: Status: RESOLVED | Noted: 2021-06-08 | Resolved: 2021-08-19

## 2021-08-19 PROBLEM — R74.8 ELEVATED LIVER ENZYMES: Status: RESOLVED | Noted: 2020-11-12 | Resolved: 2021-08-19

## 2021-08-19 PROBLEM — H35.52 RETINITIS PIGMENTOSA: Status: RESOLVED | Noted: 2020-03-12 | Resolved: 2021-08-19

## 2021-09-01 PROBLEM — B35.1 ONYCHOMYCOSIS: Status: ACTIVE | Noted: 2021-09-01

## 2021-09-01 PROBLEM — R26.2 DIFFICULTY WALKING: Status: ACTIVE | Noted: 2021-09-01

## 2021-09-08 PROBLEM — H90.3 SENSORY HEARING LOSS, BILATERAL: Status: ACTIVE | Noted: 2021-09-08

## 2021-09-08 PROBLEM — H93.299 ABNORMAL AUDITORY PERCEPTION, UNSPECIFIED LATERALITY: Status: ACTIVE | Noted: 2021-09-08

## 2021-09-20 ENCOUNTER — LAB ENCOUNTER (OUTPATIENT)
Dept: LAB | Age: 76
End: 2021-09-20
Attending: COLON & RECTAL SURGERY
Payer: MEDICARE

## 2021-09-20 DIAGNOSIS — Z01.818 PREOP TESTING: ICD-10-CM

## 2021-09-20 LAB
ANION GAP SERPL CALC-SCNC: 4 MMOL/L (ref 0–18)
BASOPHILS # BLD AUTO: 0.03 X10(3) UL (ref 0–0.2)
BASOPHILS NFR BLD AUTO: 0.8 %
BUN BLD-MCNC: 21 MG/DL (ref 7–18)
CALCIUM BLD-MCNC: 9.1 MG/DL (ref 8.5–10.1)
CHLORIDE SERPL-SCNC: 108 MMOL/L (ref 98–112)
CO2 SERPL-SCNC: 25 MMOL/L (ref 21–32)
CREAT BLD-MCNC: 0.67 MG/DL
EOSINOPHIL # BLD AUTO: 0.03 X10(3) UL (ref 0–0.7)
EOSINOPHIL NFR BLD AUTO: 0.8 %
ERYTHROCYTE [DISTWIDTH] IN BLOOD BY AUTOMATED COUNT: 16.5 %
GLUCOSE BLD-MCNC: 99 MG/DL (ref 70–99)
HCT VFR BLD AUTO: 41.4 %
HGB BLD-MCNC: 13.8 G/DL
IMM GRANULOCYTES # BLD AUTO: 0.01 X10(3) UL (ref 0–1)
IMM GRANULOCYTES NFR BLD: 0.3 %
LYMPHOCYTES # BLD AUTO: 0.39 X10(3) UL (ref 1–4)
LYMPHOCYTES NFR BLD AUTO: 10.4 %
MCH RBC QN AUTO: 32.4 PG (ref 26–34)
MCHC RBC AUTO-ENTMCNC: 33.3 G/DL (ref 31–37)
MCV RBC AUTO: 97.2 FL
MONOCYTES # BLD AUTO: 0.52 X10(3) UL (ref 0.1–1)
MONOCYTES NFR BLD AUTO: 13.8 %
NEUTROPHILS # BLD AUTO: 2.78 X10 (3) UL (ref 1.5–7.7)
NEUTROPHILS # BLD AUTO: 2.78 X10(3) UL (ref 1.5–7.7)
NEUTROPHILS NFR BLD AUTO: 73.9 %
OSMOLALITY SERPL CALC.SUM OF ELEC: 287 MOSM/KG (ref 275–295)
PLATELET # BLD AUTO: 165 10(3)UL (ref 150–450)
POTASSIUM SERPL-SCNC: 4.5 MMOL/L (ref 3.5–5.1)
RBC # BLD AUTO: 4.26 X10(6)UL
SODIUM SERPL-SCNC: 137 MMOL/L (ref 136–145)
WBC # BLD AUTO: 3.8 X10(3) UL (ref 4–11)

## 2021-09-20 PROCEDURE — 86900 BLOOD TYPING SEROLOGIC ABO: CPT

## 2021-09-20 PROCEDURE — 86850 RBC ANTIBODY SCREEN: CPT

## 2021-09-20 PROCEDURE — 80048 BASIC METABOLIC PNL TOTAL CA: CPT

## 2021-09-20 PROCEDURE — 36415 COLL VENOUS BLD VENIPUNCTURE: CPT

## 2021-09-20 PROCEDURE — 86901 BLOOD TYPING SEROLOGIC RH(D): CPT

## 2021-09-20 PROCEDURE — 85025 COMPLETE CBC W/AUTO DIFF WBC: CPT

## 2021-09-21 LAB
ANTIBODY SCREEN: NEGATIVE
RH BLOOD TYPE: POSITIVE

## 2021-09-21 NOTE — PAT NURSING NOTE
Spoke to Jeane rain at [employer] office that pt. didn't receive any ERAS prep instructions,antibiotics said she will inform Donya.

## 2021-09-21 NOTE — PAT NURSING NOTE
Donya called back said what's the order of Penny Valencia is just clear liquid for ERAS prep.will call sister to  Ensure.

## 2021-09-21 NOTE — PAT NURSING NOTE
LM to [facility] [place] surgical scheduler of Dr. Georgia Boykin that per sister of pt.didn't receive any instructions for ERAS prep for surgery.

## 2021-09-22 LAB — SARS-COV-2 RNA RESP QL NAA+PROBE: NOT DETECTED

## 2021-09-23 ENCOUNTER — HOSPITAL ENCOUNTER (INPATIENT)
Facility: HOSPITAL | Age: 76
LOS: 4 days | Discharge: HOME HEALTH | DRG: 330 | End: 2021-09-27
Attending: COLON & RECTAL SURGERY | Admitting: COLON & RECTAL SURGERY
Payer: MEDICARE

## 2021-09-23 ENCOUNTER — ANESTHESIA EVENT (OUTPATIENT)
Dept: SURGERY | Facility: HOSPITAL | Age: 76
DRG: 330 | End: 2021-09-23
Payer: MEDICARE

## 2021-09-23 ENCOUNTER — ANESTHESIA (OUTPATIENT)
Dept: SURGERY | Facility: HOSPITAL | Age: 76
DRG: 330 | End: 2021-09-23
Payer: MEDICARE

## 2021-09-23 DIAGNOSIS — Z01.818 PREOP TESTING: Primary | ICD-10-CM

## 2021-09-23 DIAGNOSIS — K43.5 PARASTOMAL HERNIA WITHOUT OBSTRUCTION OR GANGRENE: ICD-10-CM

## 2021-09-23 DIAGNOSIS — Z43.2 ATTENTION TO ILEOSTOMY (HCC): ICD-10-CM

## 2021-09-23 PROCEDURE — 88302 TISSUE EXAM BY PATHOLOGIST: CPT | Performed by: COLON & RECTAL SURGERY

## 2021-09-23 PROCEDURE — S0030 INJECTION, METRONIDAZOLE: HCPCS

## 2021-09-23 PROCEDURE — 88307 TISSUE EXAM BY PATHOLOGIST: CPT | Performed by: COLON & RECTAL SURGERY

## 2021-09-23 PROCEDURE — 0DBB0ZZ EXCISION OF ILEUM, OPEN APPROACH: ICD-10-PCS | Performed by: COLON & RECTAL SURGERY

## 2021-09-23 PROCEDURE — S0030 INJECTION, METRONIDAZOLE: HCPCS | Performed by: COLON & RECTAL SURGERY

## 2021-09-23 PROCEDURE — 0WQF0ZZ REPAIR ABDOMINAL WALL, OPEN APPROACH: ICD-10-PCS | Performed by: COLON & RECTAL SURGERY

## 2021-09-23 PROCEDURE — 82962 GLUCOSE BLOOD TEST: CPT

## 2021-09-23 PROCEDURE — 88304 TISSUE EXAM BY PATHOLOGIST: CPT | Performed by: COLON & RECTAL SURGERY

## 2021-09-23 RX ORDER — HEPARIN SODIUM 5000 [USP'U]/ML
5000 INJECTION, SOLUTION INTRAVENOUS; SUBCUTANEOUS EVERY 8 HOURS SCHEDULED
Status: DISCONTINUED | OUTPATIENT
Start: 2021-09-24 | End: 2021-09-27

## 2021-09-23 RX ORDER — POLYETHYLENE GLYCOL 3350 17 G/17G
17 POWDER, FOR SOLUTION ORAL DAILY PRN
Status: DISCONTINUED | OUTPATIENT
Start: 2021-09-23 | End: 2021-09-27

## 2021-09-23 RX ORDER — SODIUM CHLORIDE, SODIUM LACTATE, POTASSIUM CHLORIDE, CALCIUM CHLORIDE 600; 310; 30; 20 MG/100ML; MG/100ML; MG/100ML; MG/100ML
INJECTION, SOLUTION INTRAVENOUS CONTINUOUS
Status: DISCONTINUED | OUTPATIENT
Start: 2021-09-23 | End: 2021-09-23 | Stop reason: HOSPADM

## 2021-09-23 RX ORDER — BUPIVACAINE HYDROCHLORIDE 5 MG/ML
INJECTION, SOLUTION EPIDURAL; INTRACAUDAL AS NEEDED
Status: DISCONTINUED | OUTPATIENT
Start: 2021-09-23 | End: 2021-09-23 | Stop reason: HOSPADM

## 2021-09-23 RX ORDER — HYDROCODONE BITARTRATE AND ACETAMINOPHEN 5; 325 MG/1; MG/1
2 TABLET ORAL AS NEEDED
Status: DISCONTINUED | OUTPATIENT
Start: 2021-09-23 | End: 2021-09-23 | Stop reason: HOSPADM

## 2021-09-23 RX ORDER — HYDROMORPHONE HYDROCHLORIDE 1 MG/ML
0.2 INJECTION, SOLUTION INTRAMUSCULAR; INTRAVENOUS; SUBCUTANEOUS EVERY 5 MIN PRN
Status: DISCONTINUED | OUTPATIENT
Start: 2021-09-23 | End: 2021-09-23 | Stop reason: HOSPADM

## 2021-09-23 RX ORDER — METRONIDAZOLE 500 MG/100ML
500 INJECTION, SOLUTION INTRAVENOUS ONCE
Status: COMPLETED | OUTPATIENT
Start: 2021-09-23 | End: 2021-09-23

## 2021-09-23 RX ORDER — MAGNESIUM OXIDE 400 MG (241.3 MG MAGNESIUM) TABLET
400 TABLET DAILY
Status: DISCONTINUED | OUTPATIENT
Start: 2021-09-23 | End: 2021-09-27

## 2021-09-23 RX ORDER — ONDANSETRON 2 MG/ML
4 INJECTION INTRAMUSCULAR; INTRAVENOUS ONCE AS NEEDED
Status: COMPLETED | OUTPATIENT
Start: 2021-09-23 | End: 2021-09-23

## 2021-09-23 RX ORDER — FAMOTIDINE 20 MG/1
20 TABLET ORAL ONCE
Status: COMPLETED | OUTPATIENT
Start: 2021-09-23 | End: 2021-09-23

## 2021-09-23 RX ORDER — NALOXONE HYDROCHLORIDE 0.4 MG/ML
80 INJECTION, SOLUTION INTRAMUSCULAR; INTRAVENOUS; SUBCUTANEOUS AS NEEDED
Status: DISCONTINUED | OUTPATIENT
Start: 2021-09-23 | End: 2021-09-23 | Stop reason: HOSPADM

## 2021-09-23 RX ORDER — PANTOPRAZOLE SODIUM 40 MG/1
40 TABLET, DELAYED RELEASE ORAL
Status: DISCONTINUED | OUTPATIENT
Start: 2021-09-24 | End: 2021-09-27

## 2021-09-23 RX ORDER — MORPHINE SULFATE 4 MG/ML
2 INJECTION, SOLUTION INTRAMUSCULAR; INTRAVENOUS EVERY 10 MIN PRN
Status: DISCONTINUED | OUTPATIENT
Start: 2021-09-23 | End: 2021-09-23 | Stop reason: HOSPADM

## 2021-09-23 RX ORDER — CELECOXIB 200 MG/1
200 CAPSULE ORAL ONCE
Status: COMPLETED | OUTPATIENT
Start: 2021-09-23 | End: 2021-09-23

## 2021-09-23 RX ORDER — GLYCOPYRROLATE 0.2 MG/ML
INJECTION, SOLUTION INTRAMUSCULAR; INTRAVENOUS AS NEEDED
Status: DISCONTINUED | OUTPATIENT
Start: 2021-09-23 | End: 2021-09-23 | Stop reason: SURG

## 2021-09-23 RX ORDER — NEOSTIGMINE METHYLSULFATE 1 MG/ML
INJECTION INTRAVENOUS AS NEEDED
Status: DISCONTINUED | OUTPATIENT
Start: 2021-09-23 | End: 2021-09-23 | Stop reason: SURG

## 2021-09-23 RX ORDER — SODIUM CHLORIDE, SODIUM LACTATE, POTASSIUM CHLORIDE, CALCIUM CHLORIDE 600; 310; 30; 20 MG/100ML; MG/100ML; MG/100ML; MG/100ML
INJECTION, SOLUTION INTRAVENOUS CONTINUOUS
Status: DISCONTINUED | OUTPATIENT
Start: 2021-09-23 | End: 2021-09-23

## 2021-09-23 RX ORDER — OXYCODONE HYDROCHLORIDE 5 MG/1
2.5 TABLET ORAL EVERY 4 HOURS PRN
Status: DISCONTINUED | OUTPATIENT
Start: 2021-09-23 | End: 2021-09-24

## 2021-09-23 RX ORDER — OXYCODONE HYDROCHLORIDE 5 MG/1
5 TABLET ORAL EVERY 4 HOURS PRN
Status: DISCONTINUED | OUTPATIENT
Start: 2021-09-23 | End: 2021-09-24

## 2021-09-23 RX ORDER — METOCLOPRAMIDE 10 MG/1
10 TABLET ORAL ONCE
Status: COMPLETED | OUTPATIENT
Start: 2021-09-23 | End: 2021-09-23

## 2021-09-23 RX ORDER — MORPHINE SULFATE 10 MG/ML
6 INJECTION, SOLUTION INTRAMUSCULAR; INTRAVENOUS EVERY 10 MIN PRN
Status: DISCONTINUED | OUTPATIENT
Start: 2021-09-23 | End: 2021-09-23 | Stop reason: HOSPADM

## 2021-09-23 RX ORDER — DEXAMETHASONE SODIUM PHOSPHATE 4 MG/ML
VIAL (ML) INJECTION AS NEEDED
Status: DISCONTINUED | OUTPATIENT
Start: 2021-09-23 | End: 2021-09-23 | Stop reason: SURG

## 2021-09-23 RX ORDER — HYDROMORPHONE HYDROCHLORIDE 1 MG/ML
0.6 INJECTION, SOLUTION INTRAMUSCULAR; INTRAVENOUS; SUBCUTANEOUS EVERY 5 MIN PRN
Status: DISCONTINUED | OUTPATIENT
Start: 2021-09-23 | End: 2021-09-23 | Stop reason: HOSPADM

## 2021-09-23 RX ORDER — SODIUM CHLORIDE 0.9 % (FLUSH) 0.9 %
10 SYRINGE (ML) INJECTION AS NEEDED
Status: DISCONTINUED | OUTPATIENT
Start: 2021-09-23 | End: 2021-09-27

## 2021-09-23 RX ORDER — ACETAMINOPHEN 500 MG
1000 TABLET ORAL ONCE
Status: COMPLETED | OUTPATIENT
Start: 2021-09-23 | End: 2021-09-23

## 2021-09-23 RX ORDER — ROCURONIUM BROMIDE 10 MG/ML
INJECTION, SOLUTION INTRAVENOUS AS NEEDED
Status: DISCONTINUED | OUTPATIENT
Start: 2021-09-23 | End: 2021-09-23 | Stop reason: SURG

## 2021-09-23 RX ORDER — ONDANSETRON 2 MG/ML
INJECTION INTRAMUSCULAR; INTRAVENOUS AS NEEDED
Status: DISCONTINUED | OUTPATIENT
Start: 2021-09-23 | End: 2021-09-23 | Stop reason: SURG

## 2021-09-23 RX ORDER — HALOPERIDOL 5 MG/ML
0.25 INJECTION INTRAMUSCULAR ONCE AS NEEDED
Status: DISCONTINUED | OUTPATIENT
Start: 2021-09-23 | End: 2021-09-23 | Stop reason: HOSPADM

## 2021-09-23 RX ORDER — HEPARIN SODIUM 5000 [USP'U]/ML
5000 INJECTION, SOLUTION INTRAVENOUS; SUBCUTANEOUS ONCE
Status: COMPLETED | OUTPATIENT
Start: 2021-09-23 | End: 2021-09-23

## 2021-09-23 RX ORDER — METRONIDAZOLE 500 MG/100ML
500 INJECTION, SOLUTION INTRAVENOUS EVERY 8 HOURS
Status: COMPLETED | OUTPATIENT
Start: 2021-09-23 | End: 2021-09-24

## 2021-09-23 RX ORDER — KETAMINE HYDROCHLORIDE 50 MG/ML
INJECTION, SOLUTION, CONCENTRATE INTRAMUSCULAR; INTRAVENOUS AS NEEDED
Status: DISCONTINUED | OUTPATIENT
Start: 2021-09-23 | End: 2021-09-23 | Stop reason: SURG

## 2021-09-23 RX ORDER — SODIUM CHLORIDE, SODIUM LACTATE, POTASSIUM CHLORIDE, CALCIUM CHLORIDE 600; 310; 30; 20 MG/100ML; MG/100ML; MG/100ML; MG/100ML
2 INJECTION, SOLUTION INTRAVENOUS CONTINUOUS
Status: DISCONTINUED | OUTPATIENT
Start: 2021-09-23 | End: 2021-09-24

## 2021-09-23 RX ORDER — DOCUSATE SODIUM 100 MG/1
100 CAPSULE, LIQUID FILLED ORAL 2 TIMES DAILY
Status: DISCONTINUED | OUTPATIENT
Start: 2021-09-23 | End: 2021-09-27

## 2021-09-23 RX ORDER — PROCHLORPERAZINE EDISYLATE 5 MG/ML
5 INJECTION INTRAMUSCULAR; INTRAVENOUS ONCE AS NEEDED
Status: DISCONTINUED | OUTPATIENT
Start: 2021-09-23 | End: 2021-09-23 | Stop reason: HOSPADM

## 2021-09-23 RX ORDER — HYDROMORPHONE HYDROCHLORIDE 1 MG/ML
0.2 INJECTION, SOLUTION INTRAMUSCULAR; INTRAVENOUS; SUBCUTANEOUS EVERY 2 HOUR PRN
Status: DISCONTINUED | OUTPATIENT
Start: 2021-09-23 | End: 2021-09-24

## 2021-09-23 RX ORDER — LIDOCAINE HYDROCHLORIDE 10 MG/ML
INJECTION, SOLUTION EPIDURAL; INFILTRATION; INTRACAUDAL; PERINEURAL AS NEEDED
Status: DISCONTINUED | OUTPATIENT
Start: 2021-09-23 | End: 2021-09-23 | Stop reason: SURG

## 2021-09-23 RX ORDER — CEFAZOLIN SODIUM/WATER 2 G/20 ML
2 SYRINGE (ML) INTRAVENOUS EVERY 8 HOURS
Status: COMPLETED | OUTPATIENT
Start: 2021-09-23 | End: 2021-09-24

## 2021-09-23 RX ORDER — PROCHLORPERAZINE EDISYLATE 5 MG/ML
5 INJECTION INTRAMUSCULAR; INTRAVENOUS EVERY 6 HOURS PRN
Status: DISCONTINUED | OUTPATIENT
Start: 2021-09-23 | End: 2021-09-27

## 2021-09-23 RX ORDER — HYDROMORPHONE HYDROCHLORIDE 1 MG/ML
0.4 INJECTION, SOLUTION INTRAMUSCULAR; INTRAVENOUS; SUBCUTANEOUS EVERY 5 MIN PRN
Status: DISCONTINUED | OUTPATIENT
Start: 2021-09-23 | End: 2021-09-23 | Stop reason: HOSPADM

## 2021-09-23 RX ORDER — HYDROMORPHONE HYDROCHLORIDE 1 MG/ML
0.4 INJECTION, SOLUTION INTRAMUSCULAR; INTRAVENOUS; SUBCUTANEOUS EVERY 2 HOUR PRN
Status: DISCONTINUED | OUTPATIENT
Start: 2021-09-23 | End: 2021-09-24

## 2021-09-23 RX ORDER — MORPHINE SULFATE 4 MG/ML
4 INJECTION, SOLUTION INTRAMUSCULAR; INTRAVENOUS EVERY 10 MIN PRN
Status: DISCONTINUED | OUTPATIENT
Start: 2021-09-23 | End: 2021-09-23 | Stop reason: HOSPADM

## 2021-09-23 RX ORDER — HYDROCODONE BITARTRATE AND ACETAMINOPHEN 5; 325 MG/1; MG/1
1 TABLET ORAL AS NEEDED
Status: DISCONTINUED | OUTPATIENT
Start: 2021-09-23 | End: 2021-09-23 | Stop reason: HOSPADM

## 2021-09-23 RX ORDER — CEFAZOLIN SODIUM/WATER 2 G/20 ML
2 SYRINGE (ML) INTRAVENOUS ONCE
Status: COMPLETED | OUTPATIENT
Start: 2021-09-23 | End: 2021-09-23

## 2021-09-23 RX ADMIN — ROCURONIUM BROMIDE 10 MG: 10 INJECTION, SOLUTION INTRAVENOUS at 10:41:00

## 2021-09-23 RX ADMIN — NEOSTIGMINE METHYLSULFATE 4 MG: 1 INJECTION INTRAVENOUS at 12:59:00

## 2021-09-23 RX ADMIN — DEXAMETHASONE SODIUM PHOSPHATE 4 MG: 4 MG/ML VIAL (ML) INJECTION at 11:08:00

## 2021-09-23 RX ADMIN — ROCURONIUM BROMIDE 30 MG: 10 INJECTION, SOLUTION INTRAVENOUS at 10:46:00

## 2021-09-23 RX ADMIN — CEFAZOLIN SODIUM/WATER 2 G: 2 G/20 ML SYRINGE (ML) INTRAVENOUS at 10:53:00

## 2021-09-23 RX ADMIN — GLYCOPYRROLATE 0.6 MG: 0.2 INJECTION, SOLUTION INTRAMUSCULAR; INTRAVENOUS at 12:59:00

## 2021-09-23 RX ADMIN — METRONIDAZOLE 500 MG: 500 INJECTION, SOLUTION INTRAVENOUS at 10:53:00

## 2021-09-23 RX ADMIN — LIDOCAINE HYDROCHLORIDE 30 MG: 10 INJECTION, SOLUTION EPIDURAL; INFILTRATION; INTRACAUDAL; PERINEURAL at 10:41:00

## 2021-09-23 RX ADMIN — KETAMINE HYDROCHLORIDE 50 MG: 50 INJECTION, SOLUTION, CONCENTRATE INTRAMUSCULAR; INTRAVENOUS at 11:01:00

## 2021-09-23 RX ADMIN — ROCURONIUM BROMIDE 10 MG: 10 INJECTION, SOLUTION INTRAVENOUS at 11:16:00

## 2021-09-23 RX ADMIN — ONDANSETRON 4 MG: 2 INJECTION INTRAMUSCULAR; INTRAVENOUS at 11:08:00

## 2021-09-23 RX ADMIN — SODIUM CHLORIDE, SODIUM LACTATE, POTASSIUM CHLORIDE, CALCIUM CHLORIDE: 600; 310; 30; 20 INJECTION, SOLUTION INTRAVENOUS at 13:18:00

## 2021-09-23 NOTE — ANESTHESIA PROCEDURE NOTES
Airway  Date/Time: 9/23/2021 10:40 AM  Urgency: elective    Airway not difficult    General Information and Staff    Patient location during procedure: OR  Anesthesiologist: Velia Boyd MD  Resident/CRNA: Karol Farfan CRNA  Performed: NANCY

## 2021-09-23 NOTE — ANESTHESIA POSTPROCEDURE EVALUATION
Patient: Shandra Don    Procedure Summary     Date: 09/23/21 Room / Location: [facility] MAIN OR 05 / [address] [place] [address] MAIN OR    Anesthesia Start: 8439 Anesthesia Stop: 2137    Procedure: ILEOSTOMY CLOSURE WITH RESECTION, REPAIR OF PARASTOMAL HERNIA (N/A ) Diagnosis:

## 2021-09-23 NOTE — H&P
[facility] H&P - Colon and Rectal Surgery  Kulwinder Powers MD    CHIEF COMPLAINT:  Ileostomy status    HISTORY OF PRESENT ILLNESS:  Kassie Rodriguez is a 68year old female who presents for postoperative evaluation after laparoscopic taTME with lo healed well. The ileostomy is emptied 10 times daily when 1/3 full. The appliance is changed every 4-5 days.       REVIEW OF SYSTEMS:  Fever/chills/sweats: none  Energy: good  Weight:  Gained a few pounds    Nausea/vomiting: none  Appetite: good    Ches Brother         OA   • No Known Problems Sister    • No Known Problems Sister    • Cancer Neg       Social History    Tobacco Use      Smoking status: Never Smoker      Smokeless tobacco: Never Used    Vaping Use      Vaping Use: Never used    Alcohol use: tumor.  · Twelve lymph nodes, negative for metastatic carcinoma (0/12). · Diverticulosis. · See synoptic report for further details.   · ypT0, ypN0      Electronically signed by Reshma Cartagena MD on 12/21/2020 at 11:34 AM      Synoptic Report   COLON 8/27/2021  IMPRESSION:   Complete single-contrast barium enema, demonstrating incomplete distention of the rectosigmoid   segment, without discrete constrictive lesions or extraluminal contrast leakage.        ASSESSMENT AND PLAN:    Rectal cancer, uT3N1, y

## 2021-09-23 NOTE — OPERATIVE REPORT
Operative Note    Patient Name: Florencia Carolina    Date of Surgery: 09/23/21     Preoperative Diagnosis: Attention to ileostomy [facility]) [Z43.2]  Parastomal hernia without obstruction or gangrene [K43.5]    Postoperative Diagnosis: same    Primary Surgeon:  Vinicius Brooks and then assess their integrity. The mobilized bowel was pinched as far from the ileostomy as possible on each limb and then a saline solution was instilled to test the integrity of the bowel wall under pressure.  The bowel wall integrity was good, and ther with cautery. The wound was then packed with saline wet-to-dry gauze and then covered with a dry sterile gauze. The patient was allowed to emerge from anesthesia without incident.  She was extubated in the operating room and taken to the recovery room in [place]

## 2021-09-23 NOTE — PLAN OF CARE
Received patient from PACU. Patient is alert and oriented. Very nauseous, patient dry heaving when arriving to the floor. Compazine given now resolved and resting. IVF infusing. Abdominal incision clean and intact. Sheridan catheter maintained.  Clear liquids response  - Implement non-pharmacological measures as appropriate and evaluate response  - Consider cultural and social influences on pain and pain management  - Manage/alleviate anxiety  - Utilize distraction and/or relaxation techniques  - Monitor for op pre-medicate as appropriate  9/23/2021 1534 by Anahi Deutsch RN  Outcome: Progressing  9/23/2021 1534 by Anahi Deutsch RN  Outcome: Progressing  9/23/2021 1533 by Anahi Deutsch RN  Outcome: Progressing     Problem: RISK FOR INFECTION - ADULT  Goal: Absence appropriate  - Assess patient's ability to be responsible for managing their own health  - Refer to Case Management Department for coordinating discharge planning if the patient needs post-hospital services based on physician/LIP order or complex needs rel patient's medication profile  - Implement strategies to promote bladder emptying  9/23/2021 1534 by Anahi Deutsch, RN  Outcome: Progressing  9/23/2021 1534 by Anahi Deutsch, RN  Outcome: Progressing     Problem: SKIN/TISSUE INTEGRITY - ADULT  Goal: Skin inte

## 2021-09-23 NOTE — ANESTHESIA PREPROCEDURE EVALUATION
Anesthesia PreOp Note    HPI:     Juliet Del Rio is a 68year old female who presents for preoperative consultation requested by: Charu Montana MD    Date of Surgery: 9/23/2021    Procedure(s):   ILEOSTOMY CLOSURE WITH RESECTION, REPAIR OF PARASTOMAL HERNIA • Chronic constipation 3/12/2020   • Disorder of liver     fatty liver - non alcoholic   • Diverticulosis of large intestine    • Exposure to medical diagnostic radiation     last tx oct 2020   • Hearing impairment     no hearing aids   • High blood pre DIZZINESS, PALPITATIONS  Tetracycline            NAUSEA ONLY  Adhesive Tape           OTHER (SEE COMMENTS)    Comment:If on too long causes redness  Codeine                 OTHER (SEE COMMENTS)    Comment:Dizziness, nausea/vomiting  Shellfish-Derived P* file      Frequency of Social Gatherings with Friends and Family: Not on file      Attends Sabianist Services: Not on file      Active Member of Clubs or Organizations: Not on file      Attends Club or Organization Meetings: Not on file      Marital Status 98%   Weight: 68.9 kg (152 lb) 71.1 kg (156 lb 12.8 oz)   Height: 1.499 m (4' 11\") 1.524 m (5')        Anesthesia Evaluation     Patient summary reviewed and Nursing notes reviewed    Airway   Mallampati: I  Dental - normal exam     Pulmonary - negative R

## 2021-09-23 NOTE — CONSULTS
[facility]   Hospitalist Team  Consult  Admit Date:  9/23/2021    ASSESSMENT / PLAN:   69 yo female with hx  htn-not on meds, aortic atherosclerosis, elevated lft's, GERD, retinitis pigmentosa, obesity and rectal cancer s/p chemoradiation, LAR an A/A; Ox3  SKIN: no rashes  HEENT: normocephalic, normal nose, pharynx and TM's, sclera anicteric, conjunctiva normal  NECK: supple  RESPIRATORY: normal expansion; non labored, CTA   CARDIOVASCULAR: regular,nl S1 S2  ABDOMEN: dressing D/I  GENITAL/: bryant tablet (40 mg total) by mouth daily. Before meal (Patient taking differently: Take 40 mg by mouth daily with lunch.  Before meal), Disp: 90 tablet, Rfl: 2  Calcium Carbonate Antacid (TUMS OR), Take by mouth., Disp: , Rfl:         Soc Hx  Social History    T and oriented x3  Neck Supple, no JVD  Pulm: Lungs clear, normal respiratory effort, No wheezing or crackles  CV: Heart with regular rate and rhythm, S1, S2 heard   Abd: Abdomen soft, dressing in place  : bryant in place   MSK:  no clubbing, no cyanosis.

## 2021-09-24 ENCOUNTER — APPOINTMENT (OUTPATIENT)
Dept: ULTRASOUND IMAGING | Facility: HOSPITAL | Age: 76
DRG: 330 | End: 2021-09-24
Attending: NURSE PRACTITIONER
Payer: MEDICARE

## 2021-09-24 PROCEDURE — 97162 PT EVAL MOD COMPLEX 30 MIN: CPT

## 2021-09-24 PROCEDURE — S0030 INJECTION, METRONIDAZOLE: HCPCS | Performed by: COLON & RECTAL SURGERY

## 2021-09-24 PROCEDURE — 80074 ACUTE HEPATITIS PANEL: CPT | Performed by: NURSE PRACTITIONER

## 2021-09-24 PROCEDURE — 85025 COMPLETE CBC W/AUTO DIFF WBC: CPT | Performed by: COLON & RECTAL SURGERY

## 2021-09-24 PROCEDURE — 97535 SELF CARE MNGMENT TRAINING: CPT

## 2021-09-24 PROCEDURE — 76700 US EXAM ABDOM COMPLETE: CPT | Performed by: NURSE PRACTITIONER

## 2021-09-24 PROCEDURE — 97116 GAIT TRAINING THERAPY: CPT

## 2021-09-24 PROCEDURE — 84100 ASSAY OF PHOSPHORUS: CPT | Performed by: NURSE PRACTITIONER

## 2021-09-24 PROCEDURE — 83735 ASSAY OF MAGNESIUM: CPT | Performed by: NURSE PRACTITIONER

## 2021-09-24 PROCEDURE — C9113 INJ PANTOPRAZOLE SODIUM, VIA: HCPCS | Performed by: NURSE PRACTITIONER

## 2021-09-24 PROCEDURE — 97165 OT EVAL LOW COMPLEX 30 MIN: CPT

## 2021-09-24 PROCEDURE — 97606 NEG PRS WND THER DME>50 SQCM: CPT

## 2021-09-24 PROCEDURE — 80053 COMPREHEN METABOLIC PANEL: CPT | Performed by: NURSE PRACTITIONER

## 2021-09-24 RX ORDER — ONDANSETRON 2 MG/ML
INJECTION INTRAMUSCULAR; INTRAVENOUS
Status: DISPENSED
Start: 2021-09-24 | End: 2021-09-24

## 2021-09-24 RX ORDER — TRAMADOL HYDROCHLORIDE 50 MG/1
50 TABLET ORAL EVERY 6 HOURS PRN
Qty: 10 TABLET | Refills: 0 | Status: SHIPPED | OUTPATIENT
Start: 2021-09-24 | End: 2021-09-27

## 2021-09-24 RX ORDER — TRAMADOL HYDROCHLORIDE 50 MG/1
50 TABLET ORAL EVERY 6 HOURS PRN
Status: DISCONTINUED | OUTPATIENT
Start: 2021-09-24 | End: 2021-09-27

## 2021-09-24 RX ORDER — MAGNESIUM SULFATE HEPTAHYDRATE 40 MG/ML
2 INJECTION, SOLUTION INTRAVENOUS ONCE
Status: COMPLETED | OUTPATIENT
Start: 2021-09-24 | End: 2021-09-24

## 2021-09-24 RX ORDER — SODIUM CHLORIDE, SODIUM LACTATE, POTASSIUM CHLORIDE, CALCIUM CHLORIDE 600; 310; 30; 20 MG/100ML; MG/100ML; MG/100ML; MG/100ML
INJECTION, SOLUTION INTRAVENOUS CONTINUOUS
Status: DISCONTINUED | OUTPATIENT
Start: 2021-09-24 | End: 2021-09-25

## 2021-09-24 RX ORDER — CALCIUM CARBONATE 200(500)MG
1000 TABLET,CHEWABLE ORAL EVERY 6 HOURS PRN
Status: DISCONTINUED | OUTPATIENT
Start: 2021-09-24 | End: 2021-09-27

## 2021-09-24 RX ORDER — HYDROMORPHONE HYDROCHLORIDE 1 MG/ML
0.2 INJECTION, SOLUTION INTRAMUSCULAR; INTRAVENOUS; SUBCUTANEOUS EVERY 4 HOURS PRN
Status: DISCONTINUED | OUTPATIENT
Start: 2021-09-24 | End: 2021-09-27

## 2021-09-24 RX ORDER — ACETAMINOPHEN 160 MG/5ML
650 SOLUTION ORAL EVERY 6 HOURS PRN
Status: DISCONTINUED | OUTPATIENT
Start: 2021-09-24 | End: 2021-09-27

## 2021-09-24 RX ORDER — CALCIUM CARBONATE 200(500)MG
500 TABLET,CHEWABLE ORAL 3 TIMES DAILY PRN
Status: DISCONTINUED | OUTPATIENT
Start: 2021-09-24 | End: 2021-09-24

## 2021-09-24 RX ORDER — ONDANSETRON 2 MG/ML
4 INJECTION INTRAMUSCULAR; INTRAVENOUS EVERY 6 HOURS PRN
Status: DISCONTINUED | OUTPATIENT
Start: 2021-09-24 | End: 2021-09-27

## 2021-09-24 NOTE — PROGRESS NOTES
[facility] Hospitalist Team  Progress Note    Gwendolyn Gonzalez Patient Status:  Inpatient    3/4/1945 MRN [id number]   Location [facility] 4W/SW/SE Attending Jeevan Carr MD   Hosp Day # 1 PCP Prashanth Rob DO     CC: Follow Up  PCP: Chu Mandujano breastfeeding.     GENERAL: no apparent distress, overweight  NEURO: A/A Ox3  RESP: non labored, CTA  CARDIO: Regular, no murmur  ABD: wound vac   EXTREMITIES: SCD's    DIAGNOSTIC DATA:   Labs:     Recent Labs   Lab 09/20/21  1149 09/24/21  0554   WBC 3.8* Q2H PRN  Prochlorperazine Edisylate (COMPAZINE) injection 5 mg, 5 mg, Intravenous, Q6H PRN          IMAGING     No results found. SEE ATTENDING NOTE BELOW:   Patient seen and examined independently. Discussed with APN and agree with note above.

## 2021-09-24 NOTE — OCCUPATIONAL THERAPY NOTE
OCCUPATIONAL THERAPY EVALUATION - INPATIENT     Room Number: 468/468-A  Evaluation Date: 9/24/2021  Type of Evaluation: Initial  Presenting Problem: Pt s/p ILEOSTOMY CLOSURE WITH RESECTION, REPAIR OF PARASTOMAL HERNIA 9/23.      Physician Order: IP Consult session, call button within reach & RN aware. The patient's Approx Degree of Impairment: 46.65% has been calculated based on documentation in the [facility] '6 clicks' Inpatient Daily Activity Short Form.   Research supports that patients with this level of i India Rahman MD;  Location: [address] [place] [address] ENDOSCOPY   • OTHER  11/2020    exploration of rectal tumor   • OTHER      cyst in vagina - benign       HOME SITUATION  Type of Home: House  Home Layout: Able to live on main level  Lives With:  (Sister, [place])     Toilet and Eq Score:   Score: 18  Approx Degree of Impairment: 46.65%  Standardized Score (AM-PAC Scale): 38.66  CMS Modifier (G-Code): CK    FUNCTIONAL TRANSFER ASSESSMENT  Supine to Sit : Minimum assistance  Sit to Stand: Contact guard assist  Chair Transfer: CGA    Be

## 2021-09-24 NOTE — PLAN OF CARE
Problem: Patient Centered Care  Goal: Patient preferences are identified and integrated in the patient's plan of care  Description: Interventions:  - What would you like us to know as we care for you?  I am very nauseous   - Provide timely, complete, and Implement neutropenic guidelines  Outcome: Progressing     Problem: SAFETY ADULT - FALL  Goal: Free from fall injury  Description: INTERVENTIONS:  - Assess pt frequently for physical needs  - Identify cognitive and physical deficits and behaviors that affe Advance diet as tolerated, if ordered  - Obtain nutritional consult as needed  - Evaluate fluid balance  Outcome: Progressing  Goal: Maintains or returns to baseline bowel function  Description: INTERVENTIONS:  - Assess bowel function  - Maintain adequate Patient stated some pain when repositioning but declining offer of pain meds. Abdominal dressing intact and clean. Sheridan cath draining well. Patient had an episode of nausea and vomited to a scanty amount of greenish emesis after receiving Ancef IV.  [facility]

## 2021-09-24 NOTE — PROGRESS NOTES
[facility]    Progress Note    Titi Bassett Patient Status:  Inpatient    3/4/1945 MRN [id number]   Location [facility] 4W/SW/SE Attending Catrachita Birmingham MD   Hosp Day # 1 PCP Niesha Bob DO     Subjective:     POD #1 s/p TRANSANAL TOTAL MESORECTAL EXCISION, LOOP ILEOSTOMY POSSIBLE OPEN 12/18/20)    Some N/V post op - diet as tolerated, going slow only had some clears so far and did not have any issues after oral intake  Go slow with diet as tolerated  Decrease IVF to 100cc

## 2021-09-24 NOTE — CM/SW NOTE
MDO HHC    SW placed tent referral in aidin for home health, need to follow up with choice list when avail, need final orders. Patient has hx [facility], [address] able to accept.      1259pm  PREET met with patient at bedside, introduced se

## 2021-09-24 NOTE — PLAN OF CARE
Ambar Vu is less drowsy this shift. Alert and oriented. Continues to be slightly nauseous, Compazine and Zofran  given now resolved and resting. IVF infusing. Wound vac placed to surgical incision. Voiding after bryant removal. Clear liquids.  ERAS protocol m relaxation techniques  - Monitor for opioid side effects  - Notify MD/LIP if interventions unsuccessful or patient reports new pain  - Anticipate increased pain with activity and pre-medicate as appropriate  Outcome: Progressing     Problem: RISK FOR INFEC cognitive ability or social support system  Outcome: Progressing     Problem: GASTROINTESTINAL - ADULT  Goal: Minimal or absence of nausea and vomiting  Description: INTERVENTIONS:  - Maintain adequate hydration with IV or PO as ordered and tolerated  - Na pressure ulcer development  - Assess and document skin integrity  - Assess and document dressing/incision, wound bed, drain sites and surrounding tissue  - Implement wound care per orders  - Initiate isolation precautions as appropriate  - Initiate Pressur

## 2021-09-24 NOTE — PROGRESS NOTES
09/24/21 0813   Wound 09/23/21 Other (comment) Abdomen Lower; Mid   Date First Assessed/Time First Assessed: 09/23/21 1114   Present on Hospital Admission: No  Primary Wound Type: (c) Other (comment)  Location: Abdomen  Wound Location Orientation: Lower radiation     last tx oct 2020   • Hearing impairment     no hearing aids   • High blood pressure    • Hypertension    • OAB (overactive bladder)    • Osteoarthritis     lower back, neck   • Personal history of antineoplastic chemotherapy     last chemo 10 home vac with [facility] care nurse. All questions answered. She is s/p Ileostomy reversal surgery yesterday with Dr. Chrissy Walsh. I gently removed the dressing with saline. The abdominal wound was measured. See above assessment, the wound is clean.  Skin prep applied to

## 2021-09-25 PROCEDURE — 85027 COMPLETE CBC AUTOMATED: CPT | Performed by: PHYSICIAN ASSISTANT

## 2021-09-25 PROCEDURE — 80053 COMPREHEN METABOLIC PANEL: CPT | Performed by: PHYSICIAN ASSISTANT

## 2021-09-25 PROCEDURE — 84100 ASSAY OF PHOSPHORUS: CPT | Performed by: PHYSICIAN ASSISTANT

## 2021-09-25 PROCEDURE — 83735 ASSAY OF MAGNESIUM: CPT | Performed by: PHYSICIAN ASSISTANT

## 2021-09-25 RX ORDER — POTASSIUM CHLORIDE 14.9 MG/ML
20 INJECTION INTRAVENOUS ONCE
Status: DISCONTINUED | OUTPATIENT
Start: 2021-09-25 | End: 2021-09-26 | Stop reason: ALTCHOICE

## 2021-09-25 NOTE — PROGRESS NOTES
[facility] Hospitalist Progress Note     CC: Hospital Follow up    PCP: Horace Raygoza DO       Assessment/Plan:     Principal Problem:    Ileostomy status [facility])  Active Problems:    Preop testing  Patient is a 69 yo female with hx  htn-not on meds, aortic a 133/65      Intake/Output:    Intake/Output Summary (Last 24 hours) at 9/25/2021 1303  Last data filed at 9/25/2021 1229  Gross per 24 hour   Intake 1986 ml   Output 1200 ml   Net 786 ml       Last 3 Weights  09/23/21 0813 : 156 lb 12.8 oz (71.1 kg)  09/21 Daily with lunch   • Heparin Sodium (Porcine)  5,000 Units Subcutaneous [facility]   • docusate sodium  100 mg Oral BID   • magnesium oxide  400 mg Oral Daily     • lactated ringers 100 mL/hr at 09/24/21 1031     ondansetron, traMADol, acetaminophen, HYDROmor

## 2021-09-25 NOTE — PROGRESS NOTES
[facility]    Progress Note    Ruiz Puente Patient Status:  Inpatient    3/4/1945 MRN [id number]   Location [facility] 4W/SW/SE Attending Grace Mantilla MD   Hosp Day # 2 PCP Loren Paz DO     Subjective:     POD #2 s/p TOTAL MESORECTAL EXCISION, LOOP ILEOSTOMY POSSIBLE OPEN 12/18/20)    Some N/V post op now resolved - diet as tolerated, going slow only had some clears so far and did not have any issues after oral intake  Go slow with diet as tolerated  Decrease IVF to 10 Chuck Ayala MD on 9/24/2021 at 12:53 PM

## 2021-09-25 NOTE — PHYSICAL THERAPY NOTE
PHYSICAL THERAPY EVALUATION - INPATIENT     Room Number: 468/468-A  Evaluation Date: 9/24/2021  Type of Evaluation: Initial   Physician Order: PT Eval and Treat    Presenting Problem: reversal of colostomy  Reason for Therapy: Mobility Dysfunction and Dis and ambulating with RN staff during the day. Reviewed LE exs to perform as well. She has all needs in reach and RN is aware    Patient will benefit from continued IP PT services to address these deficits in preparation for discharge.     DISCHARGE RECOMMEND WILL HAVE ILEOSCOPY ONLY NOT COLONOSCOPY OK PER Kb Singh;  Surgeon: Andreina Kern MD;  Location: [address] [place] [address] ENDOSCOPY   • OTHER  11/2020    exploration of rectal tumor   • OTHER      cyst in vagina - benign       HOME SITUATION  Type of Home: [facility] wheelchair, bedside commode, etc.): A Little   -   Moving from lying on back to sitting on the side of the bed?: A Little   How much help from another person does the patient currently need. ..   -   Moving to and from a bed to a chair (including a wheelcha negotiate 2 stairs/one curb w/ assistive device and supervision   Goal #4   Current Status    Goal #5 Patient to demonstrate independence with home activity/exercise instructions provided to patient in preparation for discharge.    Goal #5   Current Status

## 2021-09-25 NOTE — PLAN OF CARE
Problem: Patient Centered Care  Goal: Patient preferences are identified and integrated in the patient's plan of care  Description: Interventions:  - What would you like us to know as we care for you?  I am very nauseous   - Provide timely, complete, and Implement neutropenic guidelines  Outcome: Progressing     Problem: SAFETY ADULT - FALL  Goal: Free from fall injury  Description: INTERVENTIONS:  - Assess pt frequently for physical needs  - Identify cognitive and physical deficits and behaviors that affe Advance diet as tolerated, if ordered  - Obtain nutritional consult as needed  - Evaluate fluid balance  Outcome: Progressing  Goal: Maintains or returns to baseline bowel function  Description: INTERVENTIONS:  - Assess bowel function  - Maintain adequate amount of rectal bleeding in  Evening. Pt refused heparin. Cramping pain, tramadol prn. Cough drop prn for mild sore throat. Occasional cough-small amount of phlegm. Using IS. Tolerating diet. IVF continued. Wound vac in place-c/d/i.  Safety measures mainta

## 2021-09-26 PROCEDURE — 85027 COMPLETE CBC AUTOMATED: CPT | Performed by: PHYSICIAN ASSISTANT

## 2021-09-26 PROCEDURE — 84100 ASSAY OF PHOSPHORUS: CPT | Performed by: PHYSICIAN ASSISTANT

## 2021-09-26 PROCEDURE — 80053 COMPREHEN METABOLIC PANEL: CPT | Performed by: PHYSICIAN ASSISTANT

## 2021-09-26 PROCEDURE — 97530 THERAPEUTIC ACTIVITIES: CPT

## 2021-09-26 PROCEDURE — 97116 GAIT TRAINING THERAPY: CPT

## 2021-09-26 PROCEDURE — 83735 ASSAY OF MAGNESIUM: CPT | Performed by: PHYSICIAN ASSISTANT

## 2021-09-26 RX ORDER — LOPERAMIDE HYDROCHLORIDE 2 MG/1
2 CAPSULE ORAL ONCE
Status: COMPLETED | OUTPATIENT
Start: 2021-09-26 | End: 2021-09-26

## 2021-09-26 NOTE — PROGRESS NOTES
[facility] Hospitalist Progress Note     CC: Hospital Follow up    PCP: Vianney Quinonez DO       Assessment/Plan:     Principal Problem:    Ileostomy status [facility])  Active Problems:    Preop testing  Patient is a 69 yo female with hx  htn-not on meds, aortic a (104-136)/(53-66) 136/62      Intake/Output:    Intake/Output Summary (Last 24 hours) at 9/26/2021 0734  Last data filed at 9/26/2021 0508  Gross per 24 hour   Intake 1055 ml   Output 1125 ml   Net -70 ml       Last 3 Weights  09/23/21 0813 : 156 lb 12.8 o PM     Finalized by (CST): Tucker Vargas MD on 9/24/2021 at 12:53 PM              Meds:     • potassium chloride  20 mEq Intravenous Once   • pantoprazole  40 mg Oral Daily with lunch   • Heparin Sodium (Porcine)  5,000 Units Subcutaneous Q8H [address]   • docus

## 2021-09-26 NOTE — PROGRESS NOTES
General Surgery Progress Note          S: Multiple liquid stools, no nausea or vomiting. Appropriate pain.       I/O's   multiple stools    Last Recorded Vitals   09/26/21  0427   BP: 136/62   Pulse: 79   Resp: 18   Temp: 98.6 °F (37 °C)       Temp (24hrs) results found. Assessment and Plan:    1. Postoperative day #3 Loop ileostomy reversal––continues to improve. She states she still feels weak. Continue soft diet. Not ready for discharge today, hopefully tomorrow.   Social work as she will need t

## 2021-09-26 NOTE — PLAN OF CARE
Patient alert, oriented x4, using call light for assistance, up to chair during most of day, ambulating with walker with standby assist. Patient having frequent loose-soft BM's throughout last ngith and today, this afternoon requesting something to help \" techniques  - Monitor for opioid side effects  - Notify MD/LIP if interventions unsuccessful or patient reports new pain  - Anticipate increased pain with activity and pre-medicate as appropriate  Outcome: Progressing     Problem: RISK FOR INFECTION - ADUL or social support system  Outcome: Progressing     Problem: GASTROINTESTINAL - ADULT  Goal: Minimal or absence of nausea and vomiting  Description: INTERVENTIONS:  - Maintain adequate hydration with IV or PO as ordered and tolerated  - Nasogastric tube to Assess and document skin integrity  - Assess and document dressing/incision, wound bed, drain sites and surrounding tissue  - Implement wound care per orders  - Initiate isolation precautions as appropriate  - Initiate Pressure Ulcer prevention bundle as i

## 2021-09-26 NOTE — PHYSICAL THERAPY NOTE
PHYSICAL THERAPY TREATMENT NOTE - INPATIENT     Room Number: 468/468-A       Presenting Problem: reversal of colostomy    Problem List  Principal Problem:    [facility]  Active Problems:    Preop testing      PHYSICAL THERAPY ASSESSMENT     Pt Fair           Static Standing: Poor +  Dynamic Standing: Not tested    ACTIVITY TOLERANCE                         O2 WALK       AM-PAC '6-Clicks' INPATIENT SHORT FORM - BASIC MOBILITY  How much difficulty does the patient currently have. ..  -   Turning ov Status 160 ft with SBA with min VCs due to visual impairment   Goal #4 Patient will negotiate 2 stairs/one curb w/ assistive device and supervision   Goal #4   Current Status NT   Goal #5 Patient to demonstrate independence with home activity/exercise inst

## 2021-09-26 NOTE — PLAN OF CARE
Problem: Patient Centered Care  Goal: Patient preferences are identified and integrated in the patient's plan of care  Description: Interventions:  - What would you like us to know as we care for you?  I am very nauseous   - Provide timely, complete, and Implement neutropenic guidelines  Outcome: Progressing     Problem: SAFETY ADULT - FALL  Goal: Free from fall injury  Description: INTERVENTIONS:  - Assess pt frequently for physical needs  - Identify cognitive and physical deficits and behaviors that affe Advance diet as tolerated, if ordered  - Obtain nutritional consult as needed  - Evaluate fluid balance  Outcome: Progressing  Goal: Maintains or returns to baseline bowel function  Description: INTERVENTIONS:  - Assess bowel function  - Maintain adequate post void bladder scan=0. Encouraged oral intake. Multiple incontinent BM. Depends in use. Barrier cream applied at pt request. Tolerating diet. Wound vac c/d/I. Safety measures in place. Frequent rounding being done.

## 2021-09-27 VITALS
DIASTOLIC BLOOD PRESSURE: 70 MMHG | WEIGHT: 156.81 LBS | OXYGEN SATURATION: 97 % | RESPIRATION RATE: 18 BRPM | HEART RATE: 81 BPM | BODY MASS INDEX: 30.79 KG/M2 | SYSTOLIC BLOOD PRESSURE: 130 MMHG | TEMPERATURE: 99 F | HEIGHT: 60 IN

## 2021-09-27 PROBLEM — Z01.818 PREOP TESTING: Status: RESOLVED | Noted: 2021-09-23 | Resolved: 2021-09-27

## 2021-09-27 PROCEDURE — 85027 COMPLETE CBC AUTOMATED: CPT | Performed by: PHYSICIAN ASSISTANT

## 2021-09-27 PROCEDURE — 83735 ASSAY OF MAGNESIUM: CPT | Performed by: PHYSICIAN ASSISTANT

## 2021-09-27 PROCEDURE — 80053 COMPREHEN METABOLIC PANEL: CPT | Performed by: PHYSICIAN ASSISTANT

## 2021-09-27 PROCEDURE — 97606 NEG PRS WND THER DME>50 SQCM: CPT

## 2021-09-27 PROCEDURE — 84100 ASSAY OF PHOSPHORUS: CPT | Performed by: PHYSICIAN ASSISTANT

## 2021-09-27 RX ORDER — TRAMADOL HYDROCHLORIDE 50 MG/1
50 TABLET ORAL EVERY 6 HOURS PRN
Qty: 10 TABLET | Refills: 0 | Status: SHIPPED | OUTPATIENT
Start: 2021-09-27 | End: 2021-11-04 | Stop reason: ALTCHOICE

## 2021-09-27 RX ORDER — ACETAMINOPHEN 325 MG/1
650 TABLET ORAL EVERY 6 HOURS PRN
Refills: 0 | Status: SHIPPED | COMMUNITY
Start: 2021-09-27

## 2021-09-27 RX ORDER — ACETAMINOPHEN 325 MG/1
650 TABLET ORAL EVERY 6 HOURS PRN
Status: DISCONTINUED | OUTPATIENT
Start: 2021-09-27 | End: 2021-09-27

## 2021-09-27 RX ORDER — POTASSIUM CHLORIDE 14.9 MG/ML
20 INJECTION INTRAVENOUS ONCE
Status: DISCONTINUED | OUTPATIENT
Start: 2021-09-27 | End: 2021-09-27

## 2021-09-27 RX ORDER — MAGNESIUM SULFATE HEPTAHYDRATE 40 MG/ML
2 INJECTION, SOLUTION INTRAVENOUS ONCE
Status: COMPLETED | OUTPATIENT
Start: 2021-09-27 | End: 2021-09-27

## 2021-09-27 NOTE — PROGRESS NOTES
Extensive education provided regarding home wound vac, discharge instructions, prescriptions and follow up reviewed with patient, brother and sister in law. Verbalized understanding. DC'ed home with all belongings in stable condition.

## 2021-09-27 NOTE — PROGRESS NOTES
09/27/21 1017   Wound 09/23/21 Other (comment) Abdomen Lower; Mid   Date First Assessed/Time First Assessed: 09/23/21 1114   Present on Hospital Admission: No  Primary Wound Type: (c) Other (comment)  Location: Abdomen  Wound Location Orientation: Lower remover to remove the vac drape. The dressing was removed, wound was ceased and skin prep applied to the suzi wound. The wound was measured and the wound is smaller than when last measured with increased granulation tissue. I told the pt.   The vac dressing

## 2021-09-27 NOTE — PROGRESS NOTES
General Surgery Progress Note          S: One BM this AM, more formed and was having a BM while examining, still with some incontinence since reversal. Denies abd pain, states cramping was yesterday associated with flatus and BMs but improved today.   José Luis Glynn Globulin  4.1 2.8 - 4.4 g/dL    A/G Ratio 0.5 (L) 1.0 - 2.0   Magnesium    Collection Time: 09/27/21  5:32 AM   Result Value Ref Range    Magnesium 1.6 1.6 - 2.6 mg/dL   Phosphorus    Collection Time: 09/27/21  5:32 AM   Result Value Ref Range    Phosphoru

## 2021-09-27 NOTE — CM/SW NOTE
DC order present. SW notified [facility]. Sent dc summary to agency. Orders/f2f placed. Discussed with brother in law Dewane Bodily.      Hodan Barnes, MSW, [facility]    [id number]

## 2021-09-27 NOTE — PAYOR COMM NOTE
--------------  CONTINUED STAY REVIEW    Payor: [id number]  Carole [address] [place] [address] #:  [id number]  Authorization Number: [id number]    Admit date: 9/23/21  Admit time:  7:43 AM    Admitting Physician: Nancy Rey MD  Attending Physician:  Nancy Rey MD diet as tolerated  Decrease IVF to 100cc/hr, stop once tolerating clears and urinating  [place] out  Ambulate, IS  Pain mgmt prn - tylenol PRN tramadol PRN  Wound mgmt with wound VAC - currently being placed  SW for discharge planning, plan wound VAC on disc 9/25/2021  1:03 PM    9/26/2021:  General Surgery Progress Note  Multiple liquid stools, no nausea or vomiting.   Appropriate pain     09/26/21  0427   BP: 136/62   Pulse: 79   Resp: 18   Temp: 98.6 °F (37 °C)      09/20/21  1149 09/24/21  0554 09/25/21 abdominal cramps and stools as well as ability for family to manage at home with wound vac, pt has several questions   Guy Marks RN, NP     9/27/2021      MEDICATIONS ADMINISTERED IN LAST 1 DAY:  acetaminophen (TYLENOL) tab 650 mg     Date Action Dose R

## 2021-09-27 NOTE — PLAN OF CARE
Problem: Patient Centered Care  Goal: Patient preferences are identified and integrated in the patient's plan of care  Description: Interventions:  - What would you like us to know as we care for you?  I am very nauseous   - Provide timely, complete, and Administer growth factors as ordered  - Implement neutropenic guidelines  Outcome: Adequate for Discharge     Problem: SAFETY ADULT - FALL  Goal: Free from fall injury  Description: INTERVENTIONS:  - Assess pt frequently for physical needs  - Identify cogn medications  - Provide nonpharmacologic comfort measures as appropriate  - Advance diet as tolerated, if ordered  - Obtain nutritional consult as needed  - Evaluate fluid balance  Outcome: Adequate for Discharge  Goal: Maintains or returns to baseline wai Discharge     Tolerating diet well. Took PO tylenol this am for discomfort associated with wound vac change. Ambulating to bathroom. + gas, + BMs. Voiding freely post op. Plan to DC home today with brother, sister in law. Cleared medically and surgically.

## 2021-09-27 NOTE — DISCHARGE SUMMARY
[facility]ist Discharge Summary   Patient ID:  Scott Aucna  [id number]  70 year old  3/4/1945    Admit date: 9/23/2021  Discharge date: 9/27/2021    Primary Care Physician: Reynaldo Norman DO   Attending Physician: Agnes Hester MD CTA  CARDIO: Regular, no murmur  ABD: soft, NT, ND, BS+  EXTREMITIES: no edema    Operative Procedures: Procedure(s) (LRB):  ILEOSTOMY CLOSURE WITH RESECTION, REPAIR OF PARASTOMAL HERNIA (N/A)  Radiology:   No results found.     Discharge Instructions     M at first and keep safe distances to other vehicles. Your reaction times may be slower by a half second for up to a month after surgery. Medications:    Prescription pain medication (ex. Norco, tramadol) can make you nauseated, bloated and constipated.  O above    Patient improved.   Stable for discharge to home    Gen: No acute distress, alert and oriented x3  Neck Supple, no JVD  Pulm: Lungs clear, normal respiratory effort, No wheezing or crackles  CV: Heart with regular rate and rhythm, S1, S2 heard   Ab

## 2021-09-27 NOTE — PLAN OF CARE
Problem: Patient Centered Care  Goal: Patient preferences are identified and integrated in the patient's plan of care  Description: Interventions:  - What would you like us to know as we care for you?  I am very nauseous   - Provide timely, complete, and Implement neutropenic guidelines  Outcome: Progressing     Problem: SAFETY ADULT - FALL  Goal: Free from fall injury  Description: INTERVENTIONS:  - Assess pt frequently for physical needs  - Identify cognitive and physical deficits and behaviors that affe Advance diet as tolerated, if ordered  - Obtain nutritional consult as needed  - Evaluate fluid balance  Outcome: Progressing  Goal: Maintains or returns to baseline bowel function  Description: INTERVENTIONS:  - Assess bowel function  - Maintain adequate Mult. BMs overnight. Cream applied at pt request. Mild cramping-denied need for pain medicine. Safety measures in place. frequent rounding being done.

## 2021-09-27 NOTE — PROGRESS NOTES
[facility] Hospitalist Team  Progress Note    Wood  Patient Status:  Inpatient    3/4/1945 MRN [id number]   Location [facility] 4W/SW/SE Attending Ismael Wood MD   Hosp Day # 4 PCP Rosalie Jennings DO     CC: Follow Up  PCP: Bharat Moreno several questions       OBJECTIVE:   Blood pressure 136/59, pulse 81, temperature 98.5 °F (36.9 °C), temperature source Oral, resp. rate 18, height 5' (1.524 m), weight 156 lb 12.8 oz (71.1 kg), SpO2 95 %, not currently breastfeeding.     GENERAL: no appare Daily with lunch  Normal Saline Flush 0.9 % injection 10 mL, 10 mL, Intravenous, PRN  Heparin Sodium (Porcine) 5000 UNIT/ML injection 5,000 Units, 5,000 Units, Subcutaneous, Q8H [facility]  PEG 3350 (MIRALAX) powder packet 17 g, 17 g, Oral, Daily PRN  magnesium h negative CT a/p and us most recently but in 2020 pt with liver nodularity with concerns for cirrhosis   -noted increase post op  -acute hepatitis panel negative   -US liver with limited study but no definite liver lesions  -follow up with GI     GERD  -PPI

## 2021-09-28 NOTE — PAYOR COMM NOTE
--------------  DISCHARGE REVIEW    Payor: [id number] [address] #:  [id number]  Authorization Number: [id number]    Admit date: 9/23/21  Admit time:   7:43 AM  Discharge Date: 9/27/2021  5:28 PM     Admitting Physician: Shar Horton MD  Attend Parastomal Hernia, wound vac placed, pt discharged with [facility], see below for details      POD#4 S/P Ileostomy Closure with Resection, Repair of Parastomal Hernia  -prn pain meds-tylenol and tramadol  -wound vac  -LFD  -[facility]  -follow up with surgery      Recta 1-2 weeks, call to schedule apt  Contact information:  [address] [place] [address] [phone]                         Disposition: home  Discharged Condition: stable      Additional patient instructions:  Discharge Instructions: [place] up to 101F is normal after surgery. Severe swelling, fever > 101.5, redness or drainage from wound should be reported to your surgeon. Call the office number during and after hours if needed. Follow-up:      Call the office at .   Make appoin

## 2021-09-30 PROBLEM — E66.09 CLASS 1 OBESITY DUE TO EXCESS CALORIES WITHOUT SERIOUS COMORBIDITY WITH BODY MASS INDEX (BMI) OF 33.0 TO 33.9 IN ADULT: Status: ACTIVE | Noted: 2020-03-05

## 2021-10-10 ENCOUNTER — HOSPITAL ENCOUNTER (EMERGENCY)
Facility: HOSPITAL | Age: 76
Discharge: HOME | End: 2021-10-10
Attending: EMERGENCY MEDICINE
Payer: MEDICARE

## 2021-10-10 VITALS
TEMPERATURE: 99 F | HEIGHT: 59 IN | DIASTOLIC BLOOD PRESSURE: 73 MMHG | SYSTOLIC BLOOD PRESSURE: 163 MMHG | WEIGHT: 165 LBS | RESPIRATION RATE: 19 BRPM | HEART RATE: 76 BPM | BODY MASS INDEX: 33.26 KG/M2 | OXYGEN SATURATION: 97 %

## 2021-10-10 DIAGNOSIS — L03.119 CELLULITIS OF LOWER EXTREMITY, UNSPECIFIED LATERALITY: ICD-10-CM

## 2021-10-10 DIAGNOSIS — I87.2 VENOUS STASIS DERMATITIS OF BOTH LOWER EXTREMITIES: Primary | ICD-10-CM

## 2021-10-10 PROCEDURE — 99284 EMERGENCY DEPT VISIT MOD MDM: CPT

## 2021-10-10 PROCEDURE — 85025 COMPLETE CBC W/AUTO DIFF WBC: CPT | Performed by: EMERGENCY MEDICINE

## 2021-10-10 PROCEDURE — 80048 BASIC METABOLIC PNL TOTAL CA: CPT | Performed by: EMERGENCY MEDICINE

## 2021-10-10 PROCEDURE — 36415 COLL VENOUS BLD VENIPUNCTURE: CPT

## 2021-10-10 RX ORDER — CEPHALEXIN 500 MG/1
500 CAPSULE ORAL 2 TIMES DAILY
Qty: 14 CAPSULE | Refills: 0 | Status: SHIPPED | OUTPATIENT
Start: 2021-10-10 | End: 2021-10-17

## 2021-10-10 RX ORDER — LOPERAMIDE HYDROCHLORIDE 2 MG/1
2 CAPSULE ORAL 4 TIMES DAILY PRN
COMMUNITY

## 2021-10-10 NOTE — ED PROVIDER NOTES
Patient Seen in: [facility] AND [facility] Emergency Department      History   Patient presents with:  Cellulitis    Stated Complaint: cellulitis    Subjective:   HPI    55-year-old female with history of hypertension, here with complaints of bilateral leg swell OTHER  11/2020    exploration of rectal tumor   • OTHER      cyst in vagina - benign                Social History    Tobacco Use      Smoking status: Never Smoker      Smokeless tobacco: Never Used    Vaping Use      Vaping Use: Never used    Alcohol use: dermatitis, no obvious signs of cellulitis, nontender to palpation, compartments are soft, no bony tenderness   Skin:     General: Skin is warm. Neurological:      General: No focal deficit present. Mental Status: She is alert.    Psychiatric: Results Available and Reviewed by me while in ED:  No results found. EMERGENCY DEPARTMENT COURSE AND TREATMENT:  Patient's condition was stable during Emergency Department evaluation.      76yoF with leg swelling/redness  - I personally reviewed and in laterality     Disposition:  Discharge  10/10/2021  2:59 pm    Follow-up:  Bigg Sheppard DO  [address]  [id number] [phone]    Call in 2 days  As needed    We recommend that you schedule follow up care with a primary care

## 2021-10-10 NOTE — ED INITIAL ASSESSMENT (HPI)
Patient from home with swelling and redness to her lower legs that worsened since Friday. Instructed to go to ED by [facility] RN.

## 2022-01-12 ENCOUNTER — APPOINTMENT (OUTPATIENT)
Dept: GENERAL RADIOLOGY | Facility: HOSPITAL | Age: 77
End: 2022-01-12
Attending: EMERGENCY MEDICINE
Payer: MEDICARE

## 2022-01-12 ENCOUNTER — HOSPITAL ENCOUNTER (EMERGENCY)
Facility: HOSPITAL | Age: 77
Discharge: HOME | End: 2022-01-12
Attending: EMERGENCY MEDICINE
Payer: MEDICARE

## 2022-01-12 ENCOUNTER — APPOINTMENT (OUTPATIENT)
Dept: ULTRASOUND IMAGING | Facility: HOSPITAL | Age: 77
End: 2022-01-12
Attending: EMERGENCY MEDICINE
Payer: MEDICARE

## 2022-01-12 VITALS
RESPIRATION RATE: 16 BRPM | TEMPERATURE: 99 F | HEART RATE: 74 BPM | HEIGHT: 57 IN | OXYGEN SATURATION: 95 % | WEIGHT: 153 LBS | SYSTOLIC BLOOD PRESSURE: 132 MMHG | BODY MASS INDEX: 33.01 KG/M2 | DIASTOLIC BLOOD PRESSURE: 66 MMHG

## 2022-01-12 DIAGNOSIS — M10.9 ACUTE GOUT OF LEFT WRIST, UNSPECIFIED CAUSE: Primary | ICD-10-CM

## 2022-01-12 LAB
ALBUMIN SERPL-MCNC: 3.3 G/DL (ref 3.4–5)
ALBUMIN/GLOB SERPL: 0.7 {RATIO} (ref 1–2)
ALP LIVER SERPL-CCNC: 85 U/L
ALT SERPL-CCNC: 73 U/L
ANION GAP SERPL CALC-SCNC: 5 MMOL/L (ref 0–18)
AST SERPL-CCNC: 48 U/L (ref 15–37)
BASOPHILS # BLD AUTO: 0.03 X10(3) UL (ref 0–0.2)
BASOPHILS NFR BLD AUTO: 0.6 %
BILIRUB SERPL-MCNC: 0.6 MG/DL (ref 0.1–2)
BUN BLD-MCNC: 29 MG/DL (ref 7–18)
CALCIUM BLD-MCNC: 9.2 MG/DL (ref 8.5–10.1)
CHLORIDE SERPL-SCNC: 104 MMOL/L (ref 98–112)
CO2 SERPL-SCNC: 28 MMOL/L (ref 21–32)
CREAT BLD-MCNC: 0.59 MG/DL
EOSINOPHIL # BLD AUTO: 0.03 X10(3) UL (ref 0–0.7)
EOSINOPHIL NFR BLD AUTO: 0.6 %
ERYTHROCYTE [DISTWIDTH] IN BLOOD BY AUTOMATED COUNT: 17.2 %
GLOBULIN PLAS-MCNC: 4.8 G/DL (ref 2.8–4.4)
GLUCOSE BLD-MCNC: 103 MG/DL (ref 70–99)
HCT VFR BLD AUTO: 36.2 %
HGB BLD-MCNC: 11.5 G/DL
IMM GRANULOCYTES # BLD AUTO: 0.01 X10(3) UL (ref 0–1)
IMM GRANULOCYTES NFR BLD: 0.2 %
LYMPHOCYTES # BLD AUTO: 0.41 X10(3) UL (ref 1–4)
LYMPHOCYTES NFR BLD AUTO: 8 %
MCH RBC QN AUTO: 26.9 PG (ref 26–34)
MCHC RBC AUTO-ENTMCNC: 31.8 G/DL (ref 31–37)
MCV RBC AUTO: 84.6 FL
MONOCYTES # BLD AUTO: 0.7 X10(3) UL (ref 0.1–1)
MONOCYTES NFR BLD AUTO: 13.6 %
NEUTROPHILS # BLD AUTO: 3.97 X10 (3) UL (ref 1.5–7.7)
NEUTROPHILS # BLD AUTO: 3.97 X10(3) UL (ref 1.5–7.7)
NEUTROPHILS NFR BLD AUTO: 77 %
OSMOLALITY SERPL CALC.SUM OF ELEC: 290 MOSM/KG (ref 275–295)
PLATELET # BLD AUTO: 190 10(3)UL (ref 150–450)
POTASSIUM SERPL-SCNC: 3.8 MMOL/L (ref 3.5–5.1)
PROT SERPL-MCNC: 8.1 G/DL (ref 6.4–8.2)
RBC # BLD AUTO: 4.28 X10(6)UL
SODIUM SERPL-SCNC: 137 MMOL/L (ref 136–145)
URATE SERPL-MCNC: 3.1 MG/DL
WBC # BLD AUTO: 5.2 X10(3) UL (ref 4–11)

## 2022-01-12 PROCEDURE — 73110 X-RAY EXAM OF WRIST: CPT | Performed by: EMERGENCY MEDICINE

## 2022-01-12 PROCEDURE — 84550 ASSAY OF BLOOD/URIC ACID: CPT | Performed by: EMERGENCY MEDICINE

## 2022-01-12 PROCEDURE — 80053 COMPREHEN METABOLIC PANEL: CPT | Performed by: EMERGENCY MEDICINE

## 2022-01-12 PROCEDURE — 85025 COMPLETE CBC W/AUTO DIFF WBC: CPT | Performed by: EMERGENCY MEDICINE

## 2022-01-12 PROCEDURE — 93971 EXTREMITY STUDY: CPT | Performed by: EMERGENCY MEDICINE

## 2022-01-12 PROCEDURE — 99284 EMERGENCY DEPT VISIT MOD MDM: CPT

## 2022-01-12 PROCEDURE — 96374 THER/PROPH/DIAG INJ IV PUSH: CPT

## 2022-01-12 RX ORDER — INDOMETHACIN 25 MG/1
25 CAPSULE ORAL
Qty: 24 CAPSULE | Refills: 0 | Status: SHIPPED | OUTPATIENT
Start: 2022-01-12 | End: 2022-01-20

## 2022-01-12 RX ORDER — PREDNISONE 20 MG/1
60 TABLET ORAL ONCE
Status: COMPLETED | OUTPATIENT
Start: 2022-01-12 | End: 2022-01-12

## 2022-01-12 RX ORDER — PSYLLIUM HUSK 0.4 G
2 CAPSULE ORAL 3 TIMES DAILY
COMMUNITY

## 2022-01-12 RX ORDER — ONDANSETRON 2 MG/ML
4 INJECTION INTRAMUSCULAR; INTRAVENOUS ONCE
Status: DISCONTINUED | OUTPATIENT
Start: 2022-01-12 | End: 2022-01-12

## 2022-01-12 RX ORDER — MORPHINE SULFATE 2 MG/ML
2 INJECTION, SOLUTION INTRAMUSCULAR; INTRAVENOUS ONCE
Status: DISCONTINUED | OUTPATIENT
Start: 2022-01-12 | End: 2022-01-12

## 2022-01-12 RX ORDER — KETOROLAC TROMETHAMINE 30 MG/ML
15 INJECTION, SOLUTION INTRAMUSCULAR; INTRAVENOUS ONCE
Status: COMPLETED | OUTPATIENT
Start: 2022-01-12 | End: 2022-01-12

## 2022-01-12 RX ORDER — PREDNISONE 20 MG/1
60 TABLET ORAL DAILY
Qty: 12 TABLET | Refills: 0 | Status: SHIPPED | OUTPATIENT
Start: 2022-01-12 | End: 2022-01-16

## 2022-01-12 RX ORDER — CARBOXYMETHYLCELLULOSE SODIUM, GLYCERIN, POLYSORBATE 80 5; 10; 5 MG/ML; MG/ML; MG/ML
1 SOLUTION/ DROPS OPHTHALMIC DAILY
COMMUNITY

## 2022-01-12 RX ORDER — GARLIC EXTRACT 500 MG
1 CAPSULE ORAL DAILY
COMMUNITY

## 2022-01-12 RX ORDER — MULTIVITAMIN
1 TABLET ORAL DAILY
COMMUNITY

## 2022-01-12 NOTE — ED INITIAL ASSESSMENT (HPI)
Left  Shoulder pain radiating to wrist  Since few days . No history of trauma. mild swelling noted. history of arthritis .

## 2022-01-12 NOTE — ED PROVIDER NOTES
Patient Seen in: [facility] Emergency Department      History   Patient presents with:  Pain    Stated Complaint: pain to left shoulder arm and wrist worsening    Subjective:   HPI    Patient is a 66-year-old female who presents with severe left wri COLONOSCOPY OK PER CESAR;  Surgeon: Hesham Rankin MD;  Location: [address] [place] [address] ENDOSCOPY   • OTHER  11/2020    exploration of rectal tumor   • OTHER      cyst in vagina - benign                Social History    Tobacco Use      Smoking status: Never Smoker Skin:     General: Skin is warm and dry. Neurological:      Mental Status: She is alert and oriented to person, place, and time.              ED Course     Labs Reviewed   COMP METABOLIC PANEL (14) - Abnormal; Notable for the following components: chondrocalcinosis. No dislocation. CONCLUSION:  1. Findings most consistent with degenerative change.    Dictated by (CST): Annabel Fong MD on 1/12/2022 at 3:10 PM     Finalized by (CST): Annabel Fong MD on 1/12/2022 at 3:12 PM       Claudine Studies are unremarkable. Uric acid is normal but that does not rule out gout. Imaging studies are normal as well. Patient reports a little bit of relief after Toradol but she declined morphine as she states she does not tolerate it well.   We will send

## 2022-02-25 PROBLEM — I73.9 PERIPHERAL VASCULAR DISEASE (HCC): Status: ACTIVE | Noted: 2022-02-25

## 2022-03-01 PROBLEM — R26.2 DIFFICULTY WALKING: Status: RESOLVED | Noted: 2021-09-01 | Resolved: 2022-03-01

## 2022-03-01 PROBLEM — B35.1 ONYCHOMYCOSIS: Status: RESOLVED | Noted: 2021-09-01 | Resolved: 2022-03-01

## 2022-03-01 PROBLEM — L27.1 HAND FOOT SYNDROME: Status: RESOLVED | Noted: 2021-06-08 | Resolved: 2022-03-01

## 2022-03-15 PROBLEM — R15.9 FULL INCONTINENCE OF FECES: Status: ACTIVE | Noted: 2022-03-15

## 2022-05-10 ENCOUNTER — LAB ENCOUNTER (OUTPATIENT)
Dept: LAB | Age: 77
End: 2022-05-10
Attending: INTERNAL MEDICINE
Payer: MEDICARE

## 2022-05-10 DIAGNOSIS — Z01.818 PRE-OP TESTING: ICD-10-CM

## 2022-05-11 LAB — SARS-COV-2 RNA RESP QL NAA+PROBE: NOT DETECTED

## 2022-05-13 ENCOUNTER — HOSPITAL ENCOUNTER (OUTPATIENT)
Facility: HOSPITAL | Age: 77
Setting detail: HOSPITAL OUTPATIENT SURGERY
Discharge: HOME | End: 2022-05-13
Attending: INTERNAL MEDICINE | Admitting: INTERNAL MEDICINE
Payer: MEDICARE

## 2022-05-13 ENCOUNTER — ANESTHESIA (OUTPATIENT)
Dept: ENDOSCOPY | Facility: HOSPITAL | Age: 77
End: 2022-05-13
Payer: MEDICARE

## 2022-05-13 ENCOUNTER — ANESTHESIA EVENT (OUTPATIENT)
Dept: ENDOSCOPY | Facility: HOSPITAL | Age: 77
End: 2022-05-13
Payer: MEDICARE

## 2022-05-13 VITALS
BODY MASS INDEX: 28.63 KG/M2 | HEIGHT: 59 IN | WEIGHT: 142 LBS | TEMPERATURE: 97 F | RESPIRATION RATE: 19 BRPM | HEART RATE: 76 BPM | OXYGEN SATURATION: 98 % | SYSTOLIC BLOOD PRESSURE: 146 MMHG | DIASTOLIC BLOOD PRESSURE: 66 MMHG

## 2022-05-13 DIAGNOSIS — C20 RECTAL CANCER (HCC): ICD-10-CM

## 2022-05-13 DIAGNOSIS — Z01.818 PRE-OP TESTING: Primary | ICD-10-CM

## 2022-05-13 DIAGNOSIS — K74.69 OTHER CIRRHOSIS OF LIVER (HCC): ICD-10-CM

## 2022-05-13 PROCEDURE — 88312 SPECIAL STAINS GROUP 1: CPT | Performed by: INTERNAL MEDICINE

## 2022-05-13 PROCEDURE — 36415 COLL VENOUS BLD VENIPUNCTURE: CPT | Performed by: INTERNAL MEDICINE

## 2022-05-13 PROCEDURE — 88305 TISSUE EXAM BY PATHOLOGIST: CPT | Performed by: INTERNAL MEDICINE

## 2022-05-13 PROCEDURE — 0DB68ZX EXCISION OF STOMACH, VIA NATURAL OR ARTIFICIAL OPENING ENDOSCOPIC, DIAGNOSTIC: ICD-10-PCS | Performed by: INTERNAL MEDICINE

## 2022-05-13 PROCEDURE — 0DJD8ZZ INSPECTION OF LOWER INTESTINAL TRACT, VIA NATURAL OR ARTIFICIAL OPENING ENDOSCOPIC: ICD-10-PCS | Performed by: INTERNAL MEDICINE

## 2022-05-13 RX ORDER — SODIUM CHLORIDE, SODIUM LACTATE, POTASSIUM CHLORIDE, CALCIUM CHLORIDE 600; 310; 30; 20 MG/100ML; MG/100ML; MG/100ML; MG/100ML
INJECTION, SOLUTION INTRAVENOUS CONTINUOUS
Status: DISCONTINUED | OUTPATIENT
Start: 2022-05-13 | End: 2022-05-13

## 2022-05-13 RX ORDER — SODIUM CHLORIDE 0.9 % (FLUSH) 0.9 %
10 SYRINGE (ML) INJECTION AS NEEDED
Status: DISCONTINUED | OUTPATIENT
Start: 2022-05-13 | End: 2022-05-13

## 2022-05-13 RX ORDER — NALOXONE HYDROCHLORIDE 0.4 MG/ML
80 INJECTION, SOLUTION INTRAMUSCULAR; INTRAVENOUS; SUBCUTANEOUS AS NEEDED
Status: DISCONTINUED | OUTPATIENT
Start: 2022-05-13 | End: 2022-05-13

## 2022-05-13 RX ORDER — MIDAZOLAM HYDROCHLORIDE 1 MG/ML
1 INJECTION INTRAMUSCULAR; INTRAVENOUS EVERY 5 MIN PRN
Status: DISCONTINUED | OUTPATIENT
Start: 2022-05-13 | End: 2022-05-13

## 2022-05-13 RX ORDER — COLCHICINE 0.6 MG/1
0.6 CAPSULE ORAL DAILY
Qty: 90 CAPSULE | Refills: 1 | Status: SHIPPED | OUTPATIENT
Start: 2022-05-13 | End: 2022-06-12

## 2022-05-13 RX ADMIN — SODIUM CHLORIDE, SODIUM LACTATE, POTASSIUM CHLORIDE, CALCIUM CHLORIDE: 600; 310; 30; 20 INJECTION, SOLUTION INTRAVENOUS at 07:43:00

## 2022-05-13 RX ADMIN — SODIUM CHLORIDE, SODIUM LACTATE, POTASSIUM CHLORIDE, CALCIUM CHLORIDE: 600; 310; 30; 20 INJECTION, SOLUTION INTRAVENOUS at 08:05:00

## 2022-05-13 NOTE — ANESTHESIA POSTPROCEDURE EVALUATION
Patient: Marcial King    Procedure Summary     Date: 05/13/22 Room / Location: [facility] ENDOSCOPY 05 / [facility] ENDOSCOPY    Anesthesia Start: 0740 Anesthesia Stop:     Procedures:       COLONOSCOPY (N/A )      ESOPHAGOGASTRODUODENOSCOPY (EGD) (N/A ) Diagnosis:       Rectal cancer ([facility]      Other cirrhosis of liver ([facility]      (Diverticulosis; Gastric Erosions)    Surgeons: Kobe Sosa MD Anesthesiologist: Christian Garcia CRNA    Anesthesia Type: MAC ASA Status: 2          Anesthesia Type: MAC    Vitals Value Taken Time   /45 05/13/22 0810   Temp  05/13/22 0810   Pulse 72 05/13/22 0810   Resp 16 05/13/22 0810   SpO2 96 05/13/22 0810       [facility] AN Post Evaluation:   Patient Evaluated in PACU  Patient Participation: waiting for patient participation  Level of Consciousness: sleepy but conscious  Pain Management: adequate  Airway Patency:patent  Dental exam unchanged from preop  Yes    Cardiovascular Status: acceptable  Respiratory Status: acceptable  Postoperative Hydration acceptable      Mary Nichols CRNA  5/13/2022 8:10 AM

## 2022-05-13 NOTE — OPERATIVE REPORT
COLONOSCOPY AND ESOPHAGOGASTRODUODENOSCOPY REPORT    Patient Name:  Yasmin Parada  Medical Record #: [id number]  YOB: 1945  Date of Procedure: 5/13/2022    Referring physician: Suzie Oneil DO    Surgeon:  Tacho Franco. Misty Quintanilla MD    Pre-op diagnosis: History of rectal cancer in 2020 and follow-up of duodenal ulcer, possible cirrhosis    Post-op diagnosis: Diverticulosis, postsurgical changes in the rectum, gastric erosions, no varices    Medications given:  MAC    Procedure #1:    After informed consent, discussion of risks and alternatives the patient was placed in the left lateral decubitus position and the high definition colonoscope was introduced into the rectum and advanced under direct visualization to the cecum which was identified by landmarks. Bowel preparation was excellent. The mucosa was carefully inspected upon withdrawal of the scope. Withdrawal time was 8 minutes. ASA class was 2. Findings: The cecum was normal. The visualized colonic mucosa was normal with the exception of the findings below. There were a few diverticula noticed in the distal colon. There is marked scar deformity of the rectum which precluded retroflexion here. There was no evidence of ulcerations, colitis, vascular anomalies, polyps or mass lesions. Procedure #2:   With the patient still in the left lateral decubitus position the gastroscope was inserted into the oropharynx and advanced under direct visualization to the second portion of the duodenum. Upon withdrawal of the scope, a retroflexed maneuver was performed to visualize the gastric angulus and cardia. Findings: The GE junction was at 36 cm. The esophagus was normal in appearance without evidence of esophagitis, varices, Wharton's epithelium, stricture or hiatal hernia. The stomach demonstrated normal distensibility. There were no retained gastric contents and no outlet obstruction.   There were a few antral erosions which were biopsied. There were no varices or vascular anomalies. The duodenum was normal with no erosions and with a normal fold pattern by high definition endoscopy.       Plan:   High fiber diet  Await biopsy results  Repeat colonoscopy in 3 years if she is in excellent health    Specimens: Stomach  EBL: minimal    Aronchick bowel prep scale:  5 Inadequate (repeat preparation needed)  4 Poor (semisolid stool could not be suctioned and <90% of mucosa seen)  3 Fair (semisolid stool could not be suctioned, but >90% of mucosa seen)  2 Good (clear liquid covering up to 25% of mucosa, but >90% of mucosa seen)  1 Excellent (>95% of mucosa seen)

## 2022-05-23 ENCOUNTER — LAB ENCOUNTER (OUTPATIENT)
Dept: LAB | Age: 77
End: 2022-05-23
Attending: INTERNAL MEDICINE
Payer: MEDICARE

## 2022-05-23 DIAGNOSIS — Z01.818 PRE-OP TESTING: ICD-10-CM

## 2022-05-24 LAB — SARS-COV-2 RNA RESP QL NAA+PROBE: NOT DETECTED

## 2022-05-26 ENCOUNTER — HOSPITAL ENCOUNTER (OUTPATIENT)
Facility: HOSPITAL | Age: 77
Setting detail: HOSPITAL OUTPATIENT SURGERY
Discharge: HOME | End: 2022-05-26
Attending: INTERNAL MEDICINE | Admitting: INTERNAL MEDICINE
Payer: MEDICARE

## 2022-05-26 ENCOUNTER — ANESTHESIA EVENT (OUTPATIENT)
Dept: ENDOSCOPY | Facility: HOSPITAL | Age: 77
End: 2022-05-26
Payer: MEDICARE

## 2022-05-26 ENCOUNTER — ANESTHESIA (OUTPATIENT)
Dept: ENDOSCOPY | Facility: HOSPITAL | Age: 77
End: 2022-05-26
Payer: MEDICARE

## 2022-05-26 VITALS
TEMPERATURE: 98 F | BODY MASS INDEX: 28.63 KG/M2 | HEIGHT: 59 IN | WEIGHT: 142 LBS | HEART RATE: 69 BPM | SYSTOLIC BLOOD PRESSURE: 130 MMHG | OXYGEN SATURATION: 97 % | RESPIRATION RATE: 16 BRPM | DIASTOLIC BLOOD PRESSURE: 64 MMHG

## 2022-05-26 DIAGNOSIS — K83.8 DILATION OF BILIARY TRACT: ICD-10-CM

## 2022-05-26 DIAGNOSIS — Z01.818 PRE-OP TESTING: Primary | ICD-10-CM

## 2022-05-26 PROCEDURE — BD47ZZZ ULTRASONOGRAPHY OF GASTROINTESTINAL TRACT: ICD-10-PCS | Performed by: INTERNAL MEDICINE

## 2022-05-26 PROCEDURE — 88312 SPECIAL STAINS GROUP 1: CPT | Performed by: INTERNAL MEDICINE

## 2022-05-26 PROCEDURE — 36415 COLL VENOUS BLD VENIPUNCTURE: CPT | Performed by: INTERNAL MEDICINE

## 2022-05-26 PROCEDURE — 88305 TISSUE EXAM BY PATHOLOGIST: CPT | Performed by: INTERNAL MEDICINE

## 2022-05-26 PROCEDURE — 0DJ08ZZ INSPECTION OF UPPER INTESTINAL TRACT, VIA NATURAL OR ARTIFICIAL OPENING ENDOSCOPIC: ICD-10-PCS | Performed by: INTERNAL MEDICINE

## 2022-05-26 PROCEDURE — 0DB78ZX EXCISION OF STOMACH, PYLORUS, VIA NATURAL OR ARTIFICIAL OPENING ENDOSCOPIC, DIAGNOSTIC: ICD-10-PCS | Performed by: INTERNAL MEDICINE

## 2022-05-26 RX ORDER — SODIUM CHLORIDE, SODIUM LACTATE, POTASSIUM CHLORIDE, CALCIUM CHLORIDE 600; 310; 30; 20 MG/100ML; MG/100ML; MG/100ML; MG/100ML
INJECTION, SOLUTION INTRAVENOUS CONTINUOUS
Status: DISCONTINUED | OUTPATIENT
Start: 2022-05-26 | End: 2022-05-26

## 2022-05-26 RX ORDER — LIDOCAINE HYDROCHLORIDE 10 MG/ML
INJECTION, SOLUTION EPIDURAL; INFILTRATION; INTRACAUDAL; PERINEURAL AS NEEDED
Status: DISCONTINUED | OUTPATIENT
Start: 2022-05-26 | End: 2022-05-26 | Stop reason: SURG

## 2022-05-26 RX ADMIN — LIDOCAINE HYDROCHLORIDE 50 MG: 10 INJECTION, SOLUTION EPIDURAL; INFILTRATION; INTRACAUDAL; PERINEURAL at 13:50:00

## 2022-05-26 NOTE — OPERATIVE REPORT
OPERATIVE REPORT   PATIENT NAME: Anthony Walton  MRN: [id number]  DATE OF OPERATION: 5/26/2022  PREOPERATIVE DIAGNOSIS:   1. Dilated bile duct  POSTOPERATIVE DIAGNOSES:  1. Periampullary diverticulum, causing extrinsic compression on the bile duct without obvious mass lesion or stones  2. Gastric erosions  PROCEDURE PERFORMED:   1. Upper endoscopy with  Biopsy  2. Linear endoscopic ultrasound  SURGEON: Marsha Brown MD   MEDICATIONS:  none    ANESTHESIA: MAC  CONSENT: Informed and obtained from the patient  SPECIMEN: gastric biopsy   COMPLICATIONS: None immediately apparent  PROCEDURE AND FINDINGS:   After the risks and benefits of the procedure were discussed with the patient and all questions were answered, the patient signed informed consent for the procedure. I discussed the rationale for the procedure as well as the risks and benefits, with the risks including but not limited to bleeding, perforation, medication adverse event and missed lesions. She was placed in the left lateral position and once an adequate level of  sedation was achieved, the lubricated tip of an Olympus video gastroscope was introduced into the mouth and the esophagus was intubated under direct visualization. A complete examination of the esophagus, stomach and duodenum was performed including retroflexion in the stomach to view the cardia and fundus. The endoscope was straightened and removed, and the procedure was completed. The patient tolerated the procedure well. There were no immediate complications. The patient was given a written copy of their results at discharge. FINDINGS:  1. Normal esophagus with no evidence of esophagitis, stricture, ulceration, mass or other abnormalities. The squamocolumnar junction was intact. The esophagogastric junction was patent. There were no endoscopic features of eosinophilic esophagitis or Wharton's esophagus. 2.  Few gastric erosions were seen in the antrum and biopsies were taken.   3. Normal duodenum to the second portion with no ulcers or masses seen. There was a large periampullary diverticulum with a small opening to it. We could not visualize the major papilla with the forward-viewing scope. Next, the Olympus linear echoendoscope was introduced under direct visualization in the esophagus passed into the stomach and second portion of the duodenum. The bile duct was visualized from the duodenal bulb as well as second portion of the duodenum. There appeared to be prominence of the bile duct up to approximately 9 mm in maximum diameter which tapers towards the duodenum with extrinsic compression by the duodenal diverticulum on the distal aspect of the bile duct. The duodenal diverticulum was felt with water at to better visualize the distal aspect of the bile duct to minimize the air artifact during endoscopic ultrasound examination. The main pancreatic duct did not appear to be dilated. No obvious mass lesion was seen in the bile duct. No obvious stones noted. The gallbladder appeared unremarkable. The scope at this point was removed    IMPRESSION:  1. Findings described above  RECOMMENDATIONS:  1. Patient to follow-up with Dr. Teri Willoughby.   Xavier Dancer, MD

## 2023-01-06 RX ORDER — LUTEIN 20 MG
1 CAPSULE ORAL DAILY
COMMUNITY

## 2023-01-06 RX ORDER — COLCHICINE 0.6 MG/1
1 CAPSULE ORAL DAILY PRN
COMMUNITY

## 2023-01-06 RX ORDER — OFLOXACIN 3 MG/ML
1 SOLUTION/ DROPS OPHTHALMIC 4 TIMES DAILY
COMMUNITY

## 2023-01-09 ENCOUNTER — LABORATORY ENCOUNTER (OUTPATIENT)
Dept: LAB | Age: 78
End: 2023-01-09
Payer: MEDICARE

## 2023-01-09 DIAGNOSIS — H26.9 CATARACT: ICD-10-CM

## 2023-01-09 LAB
ALBUMIN SERPL-MCNC: 4 G/DL (ref 3.4–5)
ALBUMIN/GLOB SERPL: 1 {RATIO} (ref 1–2)
ALP LIVER SERPL-CCNC: 97 U/L
ALT SERPL-CCNC: 109 U/L
ANION GAP SERPL CALC-SCNC: 6 MMOL/L (ref 0–18)
AST SERPL-CCNC: 73 U/L (ref 15–37)
BILIRUB SERPL-MCNC: 0.9 MG/DL (ref 0.1–2)
BUN BLD-MCNC: 20 MG/DL (ref 7–18)
BUN/CREAT SERPL: 23 (ref 10–20)
CALCIUM BLD-MCNC: 9.9 MG/DL (ref 8.5–10.1)
CHLORIDE SERPL-SCNC: 103 MMOL/L (ref 98–112)
CO2 SERPL-SCNC: 31 MMOL/L (ref 21–32)
CREAT BLD-MCNC: 0.87 MG/DL
DEPRECATED RDW RBC AUTO: 53.1 FL (ref 35.1–46.3)
ERYTHROCYTE [DISTWIDTH] IN BLOOD BY AUTOMATED COUNT: 14.6 % (ref 11–15)
FASTING STATUS PATIENT QL REPORTED: YES
GFR SERPLBLD BASED ON 1.73 SQ M-ARVRAT: 69 ML/MIN/1.73M2 (ref 60–?)
GLOBULIN PLAS-MCNC: 4 G/DL (ref 2.8–4.4)
GLUCOSE BLD-MCNC: 94 MG/DL (ref 70–99)
HCT VFR BLD AUTO: 45.8 %
HGB BLD-MCNC: 15.3 G/DL
MCH RBC QN AUTO: 32.8 PG (ref 26–34)
MCHC RBC AUTO-ENTMCNC: 33.4 G/DL (ref 31–37)
MCV RBC AUTO: 98.1 FL
OSMOLALITY SERPL CALC.SUM OF ELEC: 292 MOSM/KG (ref 275–295)
PLATELET # BLD AUTO: 138 10(3)UL (ref 150–450)
POTASSIUM SERPL-SCNC: 4.9 MMOL/L (ref 3.5–5.1)
PROT SERPL-MCNC: 8 G/DL (ref 6.4–8.2)
RBC # BLD AUTO: 4.67 X10(6)UL
SODIUM SERPL-SCNC: 140 MMOL/L (ref 136–145)
WBC # BLD AUTO: 3.5 X10(3) UL (ref 4–11)

## 2023-01-09 PROCEDURE — 85027 COMPLETE CBC AUTOMATED: CPT

## 2023-01-09 PROCEDURE — 80053 COMPREHEN METABOLIC PANEL: CPT

## 2023-01-10 NOTE — PAT NURSING NOTE
Received Order from Dr Karyn Byrd regarding administration of Eye drops in which pt had reaction to novacaine in history.

## 2023-01-12 ENCOUNTER — ANESTHESIA (OUTPATIENT)
Dept: SURGERY | Facility: HOSPITAL | Age: 78
End: 2023-01-12
Payer: MEDICARE

## 2023-01-12 ENCOUNTER — HOSPITAL ENCOUNTER (OUTPATIENT)
Facility: HOSPITAL | Age: 78
Setting detail: HOSPITAL OUTPATIENT SURGERY
Discharge: HOME | End: 2023-01-12
Attending: OPHTHALMOLOGY | Admitting: OPHTHALMOLOGY
Payer: MEDICARE

## 2023-01-12 ENCOUNTER — ANESTHESIA EVENT (OUTPATIENT)
Dept: SURGERY | Facility: HOSPITAL | Age: 78
End: 2023-01-12
Payer: MEDICARE

## 2023-01-12 DIAGNOSIS — H26.9 CATARACT: Primary | ICD-10-CM

## 2023-01-12 LAB — POTASSIUM SERPL-SCNC: 4.3 MMOL/L (ref 3.5–5.1)

## 2023-01-12 PROCEDURE — 84132 ASSAY OF SERUM POTASSIUM: CPT | Performed by: ANESTHESIOLOGY

## 2023-01-12 PROCEDURE — 08RK3JZ REPLACEMENT OF LEFT LENS WITH SYNTHETIC SUBSTITUTE, PERCUTANEOUS APPROACH: ICD-10-PCS | Performed by: OPHTHALMOLOGY

## 2023-01-12 DEVICE — IMPLANTABLE DEVICE: Type: IMPLANTABLE DEVICE | Site: EYE | Status: FUNCTIONAL

## 2023-01-12 RX ORDER — BALANCED SALT SOLUTION 6.4; .75; .48; .3; 3.9; 1.7 MG/ML; MG/ML; MG/ML; MG/ML; MG/ML; MG/ML
SOLUTION OPHTHALMIC AS NEEDED
Status: DISCONTINUED | OUTPATIENT
Start: 2023-01-12 | End: 2023-01-12 | Stop reason: HOSPADM

## 2023-01-12 RX ORDER — TROPICAMIDE 10 MG/ML
1 SOLUTION/ DROPS OPHTHALMIC SEE ADMIN INSTRUCTIONS
Status: COMPLETED | OUTPATIENT
Start: 2023-01-12 | End: 2023-01-12

## 2023-01-12 RX ORDER — SODIUM CHLORIDE, SODIUM LACTATE, POTASSIUM CHLORIDE, CALCIUM CHLORIDE 600; 310; 30; 20 MG/100ML; MG/100ML; MG/100ML; MG/100ML
INJECTION, SOLUTION INTRAVENOUS CONTINUOUS
Status: DISCONTINUED | OUTPATIENT
Start: 2023-01-12 | End: 2023-01-12

## 2023-01-12 RX ORDER — CYCLOPENTOLATE HYDROCHLORIDE 10 MG/ML
1 SOLUTION/ DROPS OPHTHALMIC SEE ADMIN INSTRUCTIONS
Status: COMPLETED | OUTPATIENT
Start: 2023-01-12 | End: 2023-01-12

## 2023-01-12 RX ORDER — TETRACAINE HYDROCHLORIDE 5 MG/ML
SOLUTION OPHTHALMIC AS NEEDED
Status: DISCONTINUED | OUTPATIENT
Start: 2023-01-12 | End: 2023-01-12 | Stop reason: HOSPADM

## 2023-01-12 RX ORDER — MOXIFLOXACIN 5 MG/ML
SOLUTION/ DROPS OPHTHALMIC AS NEEDED
Status: DISCONTINUED | OUTPATIENT
Start: 2023-01-12 | End: 2023-01-12 | Stop reason: HOSPADM

## 2023-01-12 RX ORDER — TETRACAINE HYDROCHLORIDE 5 MG/ML
1 SOLUTION OPHTHALMIC SEE ADMIN INSTRUCTIONS
Status: COMPLETED | OUTPATIENT
Start: 2023-01-12 | End: 2023-01-12

## 2023-01-12 RX ORDER — NALOXONE HYDROCHLORIDE 0.4 MG/ML
80 INJECTION, SOLUTION INTRAMUSCULAR; INTRAVENOUS; SUBCUTANEOUS AS NEEDED
Status: DISCONTINUED | OUTPATIENT
Start: 2023-01-12 | End: 2023-01-12

## 2023-01-12 RX ORDER — ONDANSETRON 2 MG/ML
4 INJECTION INTRAMUSCULAR; INTRAVENOUS EVERY 6 HOURS PRN
Status: DISCONTINUED | OUTPATIENT
Start: 2023-01-12 | End: 2023-01-12

## 2023-01-12 RX ORDER — LIDOCAINE HYDROCHLORIDE 10 MG/ML
INJECTION, SOLUTION EPIDURAL; INFILTRATION; INTRACAUDAL; PERINEURAL AS NEEDED
Status: DISCONTINUED | OUTPATIENT
Start: 2023-01-12 | End: 2023-01-12 | Stop reason: HOSPADM

## 2023-01-12 RX ORDER — PHENYLEPHRINE HCL 2.5 %
1 DROPS OPHTHALMIC (EYE) SEE ADMIN INSTRUCTIONS
Status: COMPLETED | OUTPATIENT
Start: 2023-01-12 | End: 2023-01-12

## 2023-01-12 RX ORDER — METOCLOPRAMIDE HYDROCHLORIDE 5 MG/ML
10 INJECTION INTRAMUSCULAR; INTRAVENOUS EVERY 8 HOURS PRN
Status: DISCONTINUED | OUTPATIENT
Start: 2023-01-12 | End: 2023-01-12

## 2023-01-12 RX ORDER — PREDNISOLONE ACETATE 10 MG/ML
SUSPENSION/ DROPS OPHTHALMIC AS NEEDED
Status: DISCONTINUED | OUTPATIENT
Start: 2023-01-12 | End: 2023-01-12 | Stop reason: HOSPADM

## 2023-01-12 RX ORDER — MIDAZOLAM HYDROCHLORIDE 1 MG/ML
INJECTION INTRAMUSCULAR; INTRAVENOUS AS NEEDED
Status: DISCONTINUED | OUTPATIENT
Start: 2023-01-12 | End: 2023-01-20 | Stop reason: SURG

## 2023-01-12 RX ADMIN — MIDAZOLAM HYDROCHLORIDE 1 MG: 1 INJECTION INTRAMUSCULAR; INTRAVENOUS at 08:40:00

## 2023-01-12 RX ADMIN — SODIUM CHLORIDE, SODIUM LACTATE, POTASSIUM CHLORIDE, CALCIUM CHLORIDE: 600; 310; 30; 20 INJECTION, SOLUTION INTRAVENOUS at 08:49:00

## 2023-01-20 VITALS
RESPIRATION RATE: 20 BRPM | HEART RATE: 84 BPM | SYSTOLIC BLOOD PRESSURE: 116 MMHG | WEIGHT: 143.81 LBS | HEIGHT: 56 IN | TEMPERATURE: 98 F | BODY MASS INDEX: 32.35 KG/M2 | OXYGEN SATURATION: 99 % | DIASTOLIC BLOOD PRESSURE: 76 MMHG

## 2023-01-20 RX ORDER — PREDNISOLONE ACETATE 10 MG/ML
1 SUSPENSION/ DROPS OPHTHALMIC 3 TIMES DAILY
COMMUNITY

## 2023-01-23 ENCOUNTER — ANESTHESIA EVENT (OUTPATIENT)
Dept: SURGERY | Facility: HOSPITAL | Age: 78
End: 2023-01-23
Payer: MEDICARE

## 2023-01-25 RX ORDER — PHENYLEPHRINE HCL 2.5 %
1 DROPS OPHTHALMIC (EYE) ONCE
Status: CANCELLED | OUTPATIENT
Start: 2023-01-25 | End: 2023-01-25

## 2023-01-25 RX ORDER — TETRACAINE HYDROCHLORIDE 5 MG/ML
1 SOLUTION OPHTHALMIC ONCE
Status: CANCELLED | OUTPATIENT
Start: 2023-01-25 | End: 2023-01-25

## 2023-01-26 ENCOUNTER — ANESTHESIA (OUTPATIENT)
Dept: SURGERY | Facility: HOSPITAL | Age: 78
End: 2023-01-26
Payer: MEDICARE

## 2023-01-26 ENCOUNTER — HOSPITAL ENCOUNTER (OUTPATIENT)
Facility: HOSPITAL | Age: 78
Setting detail: HOSPITAL OUTPATIENT SURGERY
Discharge: HOME | End: 2023-01-26
Attending: OPHTHALMOLOGY | Admitting: OPHTHALMOLOGY
Payer: MEDICARE

## 2023-01-26 VITALS
DIASTOLIC BLOOD PRESSURE: 69 MMHG | WEIGHT: 145 LBS | TEMPERATURE: 98 F | RESPIRATION RATE: 16 BRPM | SYSTOLIC BLOOD PRESSURE: 142 MMHG | OXYGEN SATURATION: 97 % | HEART RATE: 69 BPM | BODY MASS INDEX: 32.62 KG/M2 | HEIGHT: 56 IN

## 2023-01-26 PROCEDURE — 08RJ3JZ REPLACEMENT OF RIGHT LENS WITH SYNTHETIC SUBSTITUTE, PERCUTANEOUS APPROACH: ICD-10-PCS | Performed by: OPHTHALMOLOGY

## 2023-01-26 DEVICE — TECNIS SMPLCTY TECNIS 1PC CLR MONO 23.0D
Type: IMPLANTABLE DEVICE | Site: EYE | Status: FUNCTIONAL
Brand: TECNIS SIMPLICITY

## 2023-01-26 RX ORDER — TROPICAMIDE 10 MG/ML
1 SOLUTION/ DROPS OPHTHALMIC SEE ADMIN INSTRUCTIONS
Status: COMPLETED | OUTPATIENT
Start: 2023-01-26 | End: 2023-01-26

## 2023-01-26 RX ORDER — SODIUM CHLORIDE, SODIUM LACTATE, POTASSIUM CHLORIDE, CALCIUM CHLORIDE 600; 310; 30; 20 MG/100ML; MG/100ML; MG/100ML; MG/100ML
INJECTION, SOLUTION INTRAVENOUS CONTINUOUS
Status: DISCONTINUED | OUTPATIENT
Start: 2023-01-26 | End: 2023-01-26

## 2023-01-26 RX ORDER — PHENYLEPHRINE HCL 2.5 %
1 DROPS OPHTHALMIC (EYE) SEE ADMIN INSTRUCTIONS
Status: COMPLETED | OUTPATIENT
Start: 2023-01-26 | End: 2023-01-26

## 2023-01-26 RX ORDER — PREDNISOLONE ACETATE 10 MG/ML
SUSPENSION/ DROPS OPHTHALMIC AS NEEDED
Status: DISCONTINUED | OUTPATIENT
Start: 2023-01-26 | End: 2023-01-26 | Stop reason: HOSPADM

## 2023-01-26 RX ORDER — NALOXONE HYDROCHLORIDE 0.4 MG/ML
80 INJECTION, SOLUTION INTRAMUSCULAR; INTRAVENOUS; SUBCUTANEOUS AS NEEDED
Status: DISCONTINUED | OUTPATIENT
Start: 2023-01-26 | End: 2023-01-26

## 2023-01-26 RX ORDER — HYDROMORPHONE HYDROCHLORIDE 1 MG/ML
0.2 INJECTION, SOLUTION INTRAMUSCULAR; INTRAVENOUS; SUBCUTANEOUS EVERY 5 MIN PRN
Status: DISCONTINUED | OUTPATIENT
Start: 2023-01-26 | End: 2023-01-26

## 2023-01-26 RX ORDER — HYDROMORPHONE HYDROCHLORIDE 1 MG/ML
0.6 INJECTION, SOLUTION INTRAMUSCULAR; INTRAVENOUS; SUBCUTANEOUS EVERY 5 MIN PRN
Status: DISCONTINUED | OUTPATIENT
Start: 2023-01-26 | End: 2023-01-26

## 2023-01-26 RX ORDER — LIDOCAINE HYDROCHLORIDE 10 MG/ML
INJECTION, SOLUTION EPIDURAL; INFILTRATION; INTRACAUDAL; PERINEURAL AS NEEDED
Status: DISCONTINUED | OUTPATIENT
Start: 2023-01-26 | End: 2023-01-26 | Stop reason: HOSPADM

## 2023-01-26 RX ORDER — TETRACAINE HYDROCHLORIDE 5 MG/ML
1 SOLUTION OPHTHALMIC SEE ADMIN INSTRUCTIONS
Status: COMPLETED | OUTPATIENT
Start: 2023-01-26 | End: 2023-01-26

## 2023-01-26 RX ORDER — CYCLOPENTOLATE HYDROCHLORIDE 10 MG/ML
1 SOLUTION/ DROPS OPHTHALMIC SEE ADMIN INSTRUCTIONS
Status: COMPLETED | OUTPATIENT
Start: 2023-01-26 | End: 2023-01-26

## 2023-01-26 RX ORDER — MIDAZOLAM HYDROCHLORIDE 1 MG/ML
INJECTION INTRAMUSCULAR; INTRAVENOUS AS NEEDED
Status: DISCONTINUED | OUTPATIENT
Start: 2023-01-26 | End: 2023-01-26 | Stop reason: SURG

## 2023-01-26 RX ORDER — LIDOCAINE HYDROCHLORIDE 10 MG/ML
INJECTION, SOLUTION EPIDURAL; INFILTRATION; INTRACAUDAL; PERINEURAL AS NEEDED
Status: DISCONTINUED | OUTPATIENT
Start: 2023-01-26 | End: 2023-01-26 | Stop reason: SURG

## 2023-01-26 RX ORDER — BALANCED SALT SOLUTION 6.4; .75; .48; .3; 3.9; 1.7 MG/ML; MG/ML; MG/ML; MG/ML; MG/ML; MG/ML
SOLUTION OPHTHALMIC AS NEEDED
Status: DISCONTINUED | OUTPATIENT
Start: 2023-01-26 | End: 2023-01-26 | Stop reason: HOSPADM

## 2023-01-26 RX ORDER — HYDROMORPHONE HYDROCHLORIDE 1 MG/ML
0.4 INJECTION, SOLUTION INTRAMUSCULAR; INTRAVENOUS; SUBCUTANEOUS EVERY 5 MIN PRN
Status: DISCONTINUED | OUTPATIENT
Start: 2023-01-26 | End: 2023-01-26

## 2023-01-26 RX ORDER — ONDANSETRON 2 MG/ML
INJECTION INTRAMUSCULAR; INTRAVENOUS AS NEEDED
Status: DISCONTINUED | OUTPATIENT
Start: 2023-01-26 | End: 2023-01-26 | Stop reason: SURG

## 2023-01-26 RX ADMIN — LIDOCAINE HYDROCHLORIDE 50 MG: 10 INJECTION, SOLUTION EPIDURAL; INFILTRATION; INTRACAUDAL; PERINEURAL at 08:16:00

## 2023-01-26 RX ADMIN — SODIUM CHLORIDE, SODIUM LACTATE, POTASSIUM CHLORIDE, CALCIUM CHLORIDE: 600; 310; 30; 20 INJECTION, SOLUTION INTRAVENOUS at 08:12:00

## 2023-01-26 RX ADMIN — MIDAZOLAM HYDROCHLORIDE 1 MG: 1 INJECTION INTRAMUSCULAR; INTRAVENOUS at 08:12:00

## 2023-01-26 RX ADMIN — ONDANSETRON 4 MG: 2 INJECTION INTRAMUSCULAR; INTRAVENOUS at 08:24:00

## 2023-01-26 RX ADMIN — MIDAZOLAM HYDROCHLORIDE 1 MG: 1 INJECTION INTRAMUSCULAR; INTRAVENOUS at 08:18:00

## 2023-01-26 RX ADMIN — SODIUM CHLORIDE, SODIUM LACTATE, POTASSIUM CHLORIDE, CALCIUM CHLORIDE: 600; 310; 30; 20 INJECTION, SOLUTION INTRAVENOUS at 08:50:00

## 2023-01-26 NOTE — INTERVAL H&P NOTE
Pre-op Diagnosis: CATARACT    The above referenced H&P was reviewed by Alfredito Regalado MD on 1/26/2023, the patient was examined and no significant changes have occurred in the patient's condition since the H&P was performed. I discussed with the patient and/or legal representative the potential benefits, risks and side effects of this procedure; the likelihood of the patient achieving goals; and potential problems that might occur during recuperation. I discussed reasonable alternatives to the procedure, including risks, benefits and side effects related to the alternatives and risks related to not receiving this procedure. We will proceed with procedure as planned.

## 2023-03-20 PROCEDURE — 99285 EMERGENCY DEPT VISIT HI MDM: CPT

## 2023-03-20 PROCEDURE — 36415 COLL VENOUS BLD VENIPUNCTURE: CPT

## 2023-03-20 PROCEDURE — 93010 ELECTROCARDIOGRAM REPORT: CPT

## 2023-03-21 ENCOUNTER — APPOINTMENT (OUTPATIENT)
Dept: GENERAL RADIOLOGY | Facility: HOSPITAL | Age: 78
End: 2023-03-21
Attending: EMERGENCY MEDICINE
Payer: MEDICARE

## 2023-03-21 ENCOUNTER — APPOINTMENT (OUTPATIENT)
Dept: CT IMAGING | Facility: HOSPITAL | Age: 78
End: 2023-03-21
Attending: EMERGENCY MEDICINE
Payer: MEDICARE

## 2023-03-21 ENCOUNTER — HOSPITAL ENCOUNTER (EMERGENCY)
Facility: HOSPITAL | Age: 78
Discharge: HOME | End: 2023-03-21
Attending: EMERGENCY MEDICINE
Payer: MEDICARE

## 2023-03-21 VITALS
BODY MASS INDEX: 29.23 KG/M2 | WEIGHT: 145 LBS | SYSTOLIC BLOOD PRESSURE: 130 MMHG | OXYGEN SATURATION: 97 % | HEART RATE: 74 BPM | DIASTOLIC BLOOD PRESSURE: 58 MMHG | TEMPERATURE: 98 F | RESPIRATION RATE: 16 BRPM | HEIGHT: 59 IN
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
## 2023-03-21 LAB
ALBUMIN SERPL-MCNC: 3.3 G/DL (ref 3.4–5)
ALBUMIN/GLOB SERPL: 0.7 {RATIO} (ref 1–2)
ALP LIVER SERPL-CCNC: 80 U/L
ALT SERPL-CCNC: 89 U/L
ANION GAP SERPL CALC-SCNC: 6 MMOL/L (ref 0–18)
AST SERPL-CCNC: 59 U/L (ref 15–37)
ATRIAL RATE: 73 BPM
BASOPHILS # BLD AUTO: 0.03 X10(3) UL (ref 0–0.2)
BASOPHILS NFR BLD AUTO: 0.6 %
BILIRUB SERPL-MCNC: 0.4 MG/DL (ref 0.1–2)
BILIRUB UR QL STRIP.AUTO: NEGATIVE
BUN BLD-MCNC: 31 MG/DL (ref 7–18)
CALCIUM BLD-MCNC: 9.2 MG/DL (ref 8.5–10.1)
CHLORIDE SERPL-SCNC: 106 MMOL/L (ref 98–112)
CO2 SERPL-SCNC: 26 MMOL/L (ref 21–32)
COLOR UR AUTO: COLORLESS
CREAT BLD-MCNC: 0.67 MG/DL
EOSINOPHIL # BLD AUTO: 0.03 X10(3) UL (ref 0–0.7)
EOSINOPHIL NFR BLD AUTO: 0.6 %
ERYTHROCYTE [DISTWIDTH] IN BLOOD BY AUTOMATED COUNT: 15.2 %
GFR SERPLBLD BASED ON 1.73 SQ M-ARVRAT: 89 ML/MIN/1.73M2 (ref 60–?)
GLOBULIN PLAS-MCNC: 4.5 G/DL (ref 2.8–4.4)
GLUCOSE BLD-MCNC: 109 MG/DL (ref 70–99)
GLUCOSE UR STRIP.AUTO-MCNC: NEGATIVE MG/DL
HCT VFR BLD AUTO: 40.5 %
HGB BLD-MCNC: 14 G/DL
IMM GRANULOCYTES # BLD AUTO: 0.01 X10(3) UL (ref 0–1)
IMM GRANULOCYTES NFR BLD: 0.2 %
KETONES UR STRIP.AUTO-MCNC: NEGATIVE MG/DL
LYMPHOCYTES # BLD AUTO: 0.55 X10(3) UL (ref 1–4)
LYMPHOCYTES NFR BLD AUTO: 11.7 %
MCH RBC QN AUTO: 32.1 PG (ref 26–34)
MCHC RBC AUTO-ENTMCNC: 34.6 G/DL (ref 31–37)
MCV RBC AUTO: 92.9 FL
MONOCYTES # BLD AUTO: 0.63 X10(3) UL (ref 0.1–1)
MONOCYTES NFR BLD AUTO: 13.3 %
NEUTROPHILS # BLD AUTO: 3.47 X10 (3) UL (ref 1.5–7.7)
NEUTROPHILS # BLD AUTO: 3.47 X10(3) UL (ref 1.5–7.7)
NEUTROPHILS NFR BLD AUTO: 73.6 %
NITRITE UR QL STRIP.AUTO: NEGATIVE
OSMOLALITY SERPL CALC.SUM OF ELEC: 293 MOSM/KG (ref 275–295)
P AXIS: 14 DEGREES
P-R INTERVAL: 176 MS
PH UR STRIP.AUTO: 5 [PH] (ref 5–8)
PLATELET # BLD AUTO: 125 10(3)UL (ref 150–450)
POTASSIUM SERPL-SCNC: 3.9 MMOL/L (ref 3.5–5.1)
PROT SERPL-MCNC: 7.8 G/DL (ref 6.4–8.2)
PROT UR STRIP.AUTO-MCNC: NEGATIVE MG/DL
Q-T INTERVAL: 368 MS
QRS DURATION: 78 MS
QTC CALCULATION (BEZET): 405 MS
R AXIS: 23 DEGREES
RBC # BLD AUTO: 4.36 X10(6)UL
RBC UR QL AUTO: NEGATIVE
SODIUM SERPL-SCNC: 138 MMOL/L (ref 136–145)
SP GR UR STRIP.AUTO: 1 (ref 1–1.03)
T AXIS: 21 DEGREES
UROBILINOGEN UR STRIP.AUTO-MCNC: <2 MG/DL
VENTRICULAR RATE: 73 BPM
WBC # BLD AUTO: 4.7 X10(3) UL (ref 4–11)

## 2023-03-21 PROCEDURE — 85025 COMPLETE CBC W/AUTO DIFF WBC: CPT | Performed by: EMERGENCY MEDICINE

## 2023-03-21 PROCEDURE — 80053 COMPREHEN METABOLIC PANEL: CPT | Performed by: EMERGENCY MEDICINE

## 2023-03-21 PROCEDURE — 87086 URINE CULTURE/COLONY COUNT: CPT | Performed by: EMERGENCY MEDICINE

## 2023-03-21 PROCEDURE — 73130 X-RAY EXAM OF HAND: CPT | Performed by: EMERGENCY MEDICINE

## 2023-03-21 PROCEDURE — 81001 URINALYSIS AUTO W/SCOPE: CPT

## 2023-03-21 PROCEDURE — 73110 X-RAY EXAM OF WRIST: CPT | Performed by: EMERGENCY MEDICINE

## 2023-03-21 PROCEDURE — 70450 CT HEAD/BRAIN W/O DYE: CPT | Performed by: EMERGENCY MEDICINE

## 2023-03-21 PROCEDURE — 93005 ELECTROCARDIOGRAM TRACING: CPT

## 2023-03-21 PROCEDURE — 81001 URINALYSIS AUTO W/SCOPE: CPT | Performed by: EMERGENCY MEDICINE

## 2023-03-21 NOTE — ED INITIAL ASSESSMENT (HPI)
Patient presents to the ED with c/o fall tonight. Patient states that she felt weak and fell backwards and landed on her left arm. Patient denies head injury, denies LOC. Patient was able to get herself up. Denies blood thinners.

## 2023-03-21 NOTE — ED QUICK NOTES
[facility]. Pt resting comfortably on cart, family at bedside. Waiting for dispo.

## 2023-07-31 NOTE — PLAN OF CARE
Pt is alert and oriented x4. Tolerating low fiber soft diet. ERAS protocol. Scheduled Reglan. Pt able to assist with ileostomy care. PATRICIA drain to bulb suction. Lap sites are clean, dry, and intact. Fall precautions in place. Call light within reach.     Prob or patient reports new pain  - Anticipate increased pain with activity and pre-medicate as appropriate  Outcome: Progressing     Problem: RISK FOR INFECTION - ADULT  Goal: Absence of fever/infection during anticipated neutropenic period  Description: INTER nausea and vomiting  Description: INTERVENTIONS:  - Maintain adequate hydration with IV or PO as ordered and tolerated  - Nasogastric tube to low intermittent suction as ordered  - Evaluate effectiveness of ordered antiemetic medications  - Provide nonphar Statement Selected

## (undated) DEVICE — STERILE LATEX POWDER-FREE SURGICAL GLOVESWITH NITRILE COATING: Brand: PROTEXIS

## (undated) DEVICE — SUTURE SILK 2-0 FS

## (undated) DEVICE — GOWN SURG AERO BLUE PERF XLG

## (undated) DEVICE — LAPAROSCOPIC ACCESS SYSTEM: Brand: ALEXIS LAPAROSCOPIC SYSTEM WITH KII FIOS FIRST ENTRY

## (undated) DEVICE — CONMED SCOPE SAVER BITE BLOCK, 20X27 MM: Brand: SCOPE SAVER

## (undated) DEVICE — X-RAY DETECTABLE SPONGES,16 PLY: Brand: VISTEC

## (undated) DEVICE — 6 ML SYRINGE LUER-LOCK TIP: Brand: MONOJECT

## (undated) DEVICE — UNFOLDER PLATINUM 1 SERIES CRTRDG 30/BOX: Brand: UNFOLDER PLATINUM 1 SERIES

## (undated) DEVICE — CANNULA NASAL 02/C02 ADULT

## (undated) DEVICE — SUTURE VICRYL 0 J906G

## (undated) DEVICE — PREP BETADINE SOL 5% EYE

## (undated) DEVICE — LINE MNTR ADLT SET O2 INTMD

## (undated) DEVICE — KIT ENDO ORCAPOD 160/180/190

## (undated) DEVICE — STERILE WATER 1000ML BTL

## (undated) DEVICE — PLUMEPORT ACTIV LAPAROSCOPIC SMOKE FILTRATION DEVICE: Brand: PLUMEPORT ACTIVE

## (undated) DEVICE — SIGMOID SURGICAL SUCTION INSTRUMENT: Brand: ARGYLE

## (undated) DEVICE — SUTURE CHROMIC GUT 3-0 SH

## (undated) DEVICE — PROXIMATE LINEAR CUTTER RELOAD, BLUE, 75MM: Brand: PROXIMATE

## (undated) DEVICE — SOL  .9 1000ML BTL

## (undated) DEVICE — TROCAR: Brand: KII FIOS FIRST ENTRY

## (undated) DEVICE — MEDI-VAC NON-CONDUCTIVE SUCTION TUBING 6MM X 1.8M (6FT.) L: Brand: CARDINAL HEALTH

## (undated) DEVICE — TROCAR: Brand: KII® SLEEVE

## (undated) DEVICE — 35 ML SYRINGE REGULAR TIP: Brand: MONOJECT

## (undated) DEVICE — BSS BAG CENTURION

## (undated) DEVICE — BETADINE SOL 32 OZ 10% POVIDON

## (undated) DEVICE — SOL  .9 3000ML

## (undated) DEVICE — LAPAROVUE VISIBILITY SYSTEM LAPAROSCOPIC SOLUTIONS: Brand: LAPAROVUE

## (undated) DEVICE — ACTIVE FMS W/ INTREPID* ULTRA SLEEVES, 0.9MM 30° ABS* INTREPID* BALANCED TIP: Brand: ALCON

## (undated) DEVICE — SUTURE SILK 0 FSL

## (undated) DEVICE — 3 ML SYRINGE LUER-LOCK TIP: Brand: MONOJECT

## (undated) DEVICE — DRAPE,UNDRBUT,WHT GRAD PCH,CAPPORT,20/CS: Brand: MEDLINE

## (undated) DEVICE — DISPOSABLE SUCTION/IRRIGATOR TUBE SET: Brand: AHTO

## (undated) DEVICE — KIT CLEAN ENDOKIT 1.1OZ GOWNX2

## (undated) DEVICE — SUTURE CHROMIC GUT 0 CT-2

## (undated) DEVICE — GAMMEX® PI HYBRID SIZE 8, STERILE POWDER-FREE SURGICAL GLOVE, POLYISOPRENE AND NEOPRENE BLEND: Brand: GAMMEX

## (undated) DEVICE — SUTURE VICRYL 3-0 RB-1

## (undated) DEVICE — FORCEP OPHT STORZ PLASTIC DISP

## (undated) DEVICE — FILTERLINE NASAL ADULT O2/CO2

## (undated) DEVICE — TRI-LUMEN FILTERED TUBE SET WITH ACTIVATED CHARCOAL FILTER: Brand: AIRSEAL

## (undated) DEVICE — CATARACT PATIENT CARE KIT

## (undated) DEVICE — ENDOSCOPY PACK - LOWER: Brand: MEDLINE INDUSTRIES, INC.

## (undated) DEVICE — COVER SLV UNV DISP NTR STRL LF

## (undated) DEVICE — EYE PACK: Brand: MEDLINE INDUSTRIES, INC.

## (undated) DEVICE — FORCEP RADIAL JAW 4

## (undated) DEVICE — DRAPE SHEET LG

## (undated) DEVICE — STERILE POLYISOPRENE POWDER-FREE SURGICAL GLOVES: Brand: PROTEXIS

## (undated) DEVICE — VIOLET BRAIDED (POLYGLACTIN 910), SYNTHETIC ABSORBABLE SUTURE: Brand: COATED VICRYL

## (undated) DEVICE — [HIGH FLOW INSUFFLATOR,  DO NOT USE IF PACKAGE IS DAMAGED,  KEEP DRY,  KEEP AWAY FROM SUNLIGHT,  PROTECT FROM HEAT AND RADIOACTIVE SOURCES.]: Brand: PNEUMOSURE

## (undated) DEVICE — AIRSEAL 5 MM ACCESS PORT AND LOW PROFILE OBTURATOR WITH BLADELESS OPTICAL TIP, 120 MM LENGTH: Brand: AIRSEAL

## (undated) DEVICE — TROCARS: Brand: KII® BALLOON BLUNT TIP SYSTEM

## (undated) DEVICE — FLEXIBLE YANKAUER,MEDIUM TIP, NO VACUUM CONTROL: Brand: ARGYLE

## (undated) DEVICE — GAMMEX® PI HYBRID SIZE 7.5, STERILE POWDER-FREE SURGICAL GLOVE, POLYISOPRENE AND NEOPRENE BLEND: Brand: GAMMEX

## (undated) DEVICE — SUTURE CHROMIC 2-0 SG13T

## (undated) DEVICE — SINGLE USE MEDICAL DEVICE FOR OPHTHALMIC SURGERY: Brand: SIL. COATED I/A 45 MIL 12/B

## (undated) DEVICE — SUTURE PROLENE 2-0 SH

## (undated) DEVICE — SUTURE PDS II 0 CT-1

## (undated) DEVICE — SUTURE VICRYL 0 UR-6

## (undated) DEVICE — SUTURE SILK 2-0 SA85H

## (undated) DEVICE — CLEARCUT® SIDEPORT KNIFE DUAL BEVEL 1.0MM ANGLED: Brand: CLEARCUT®

## (undated) DEVICE — POUCH 70MM OST MED 2 PC DRN

## (undated) DEVICE — INSULATED BLADE ELECTRODE 6.5

## (undated) DEVICE — SOLUTION ENDOSCOPIC ANTI-FOG NON-TOXIC NON-ABRASIVE 6 CUBIC CENTIMETER WITH RADIOPAQUE ADHESIVE-BACKED SPONGE STERILE NOT MADE WITH NATURAL RUBBER LATEX MEDICHOICE: Brand: MEDICHOICE

## (undated) DEVICE — 40580 - THE PINK PAD - ADVANCED TRENDELENBURG POSITIONING KIT: Brand: 40580 - THE PINK PAD - ADVANCED TRENDELENBURG POSITIONING KIT

## (undated) DEVICE — PROXIMATE SKIN STAPLERS (35 WIDE) CONTAINS 35 STAINLESS STEEL STAPLES (FIXED HEAD): Brand: PROXIMATE

## (undated) DEVICE — Device

## (undated) DEVICE — SUTURE VICRYL 3-0 SH

## (undated) DEVICE — LIGAMAX 5 MM ENDOSCOPIC MULTIPLE CLIP APPLIER: Brand: LIGAMAX

## (undated) DEVICE — LIGASURE LAP MARYLAND 37CM

## (undated) DEVICE — UNDYED BRAIDED (POLYGLACTIN 910), SYNTHETIC ABSORBABLE SUTURE: Brand: COATED VICRYL

## (undated) DEVICE — DRESSING SILVERLON ISLAND 13IN

## (undated) DEVICE — SUTURE PDS II 1-0 Z879G

## (undated) DEVICE — Device: Brand: CUSTOM PROCEDURE KIT

## (undated) DEVICE — CAUTERY: TIP CLEANER XR 100/CS: Brand: MEDICAL ACTION INDUSTRIES

## (undated) DEVICE — PROXIMATE RELOADABLE LINEAR CUTTER WITH SAFETY LOCK-OUT, 75MM: Brand: PROXIMATE

## (undated) DEVICE — CATH RED RUBBER 16FR S

## (undated) DEVICE — LONE STAR SMALL RING RETRACTOR

## (undated) DEVICE — 3M™ IOBAN™ 2 ANTIMICROBIAL INCISE DRAPE 6650EZ: Brand: IOBAN™ 2

## (undated) DEVICE — Device: Brand: DEFENDO AIR/WATER/SUCTION AND BIOPSY VALVE

## (undated) DEVICE — LUBRICANT JLY SURGILUBE 2OZ

## (undated) DEVICE — A P RESECTION: Brand: MEDLINE INDUSTRIES, INC.

## (undated) DEVICE — SUTURE SILK 3-0 C017D

## (undated) DEVICE — KIT IMG SYS SPY ELT ICG H2O VL

## (undated) DEVICE — INTENDED FOR TISSUE SEPARATION, AND OTHER PROCEDURES THAT REQUIRE A SHARP SURGICAL BLADE TO PUNCTURE OR CUT.: Brand: BARD-PARKER ® STAINLESS STEEL BLADES

## (undated) DEVICE — DRAPE SRG 26X15IN UTL TPE STRL

## (undated) DEVICE — SOL  .9 1000ML BAG

## (undated) DEVICE — TRANSANAL ACCESS PLATFORM WITH INSUFFLATION STABILIZATION BAG: Brand: GELPOINT PATH TRANSANAL ACCESS PLATFORM

## (undated) DEVICE — SUTURE VICRYL 2-0 SH

## (undated) DEVICE — SUTURE CHROMIC GUT 3-0 1638H

## (undated) DEVICE — 3M™ STERI-DRAPE™ INSTRUMENT POUCH 1018L: Brand: STERI-DRAPE™

## (undated) DEVICE — STERILE SURGICAL LUBRICANT, METAL TUBE: Brand: SURGILUBE

## (undated) DEVICE — DISPOSABLE GRASPER CARTRIDGE: Brand: DIRECT DRIVE REPOSABLE GRASPERS

## (undated) DEVICE — DRAPE SHEET LAPAROTOMY

## (undated) DEVICE — CLEARCUT® SLIT KNIFE INTREPID MICRO-COAXIAL SYSTEM 2.4 SB: Brand: CLEARCUT®; INTREPID

## (undated) DEVICE — LEGGINGS SRG 48X31IN CUF STRL

## (undated) DEVICE — GAUZE SPONGES,12 PLY: Brand: CURITY

## (undated) DEVICE — SUTURE CHROMIC 0 802H

## (undated) DEVICE — 1200CC GUARDIAN II: Brand: GUARDIAN

## (undated) DEVICE — DRESSING BIOPATCH 1X4 CNTR

## (undated) DEVICE — MINOR GENERAL: Brand: MEDLINE INDUSTRIES, INC.

## (undated) DEVICE — DRAPE SHEET LAPCHOLE 124X100X7

## (undated) DEVICE — CAUTERY ENDO L HOOK EZ-CLEAN

## (undated) DEVICE — CAUTERY BLADE 2IN INS E1455

## (undated) DEVICE — 3M™ RED DOT™ MONITORING ELECTRODE WITH FOAM TAPE AND STICKY GEL, 50/BAG, 20/CASE, 72/PLT 2570: Brand: RED DOT™

## (undated) DEVICE — LONE STAR 5MM HOOKS LRG STAYS

## (undated) NOTE — LETTER
[facility] Testing Department  Phone: [phone]  Right Fax: [phone] By:  PAT Date: 23    Patient Name: Rhonda Gonzalez  Surgery Date: 2023    CSN: [id number]  Medical Record: [id number]   : 3/4/1945 - A: 68 y      Sex: female  Surgeon(s): Everardo Rueda MD  Procedure: PHACOEMULSIFICATION OF RIGHT CATARACT WITH PLACEMENT OF INTRAOCULAR LENS IMPLANT, Right    Procedure Comments: PHACOEMULSIFICATION OF RIGHT CATARACT WITH PLACEMENT OF INTRAOCULAR LENS IMPLANT  Anesthesia Type: MAC    To Surgeon: Everardo Rueda MD Fax #: [phone]    The above patient has a contraindication to the medication ordered from your standing orders/Atul Angelo Pre-Op STanding orders listed below. If you would like the medication given, please fax this form back indicating the override reason with physician signature/date/time. * PHENYLEPHRINE/ TETRACAINE                 ___  Benefit outweighs risk   ___  Insignificant               ___ Low risk                 ___ Not a true allergy              ___ Dose appropriate                ___  Tolerated regimen in the past     Physician Signature: ________________________ Date/Time: ______________  Matthieu Winn FORM BACK TO:  [phone]    CONFIDENTIALITY NOTICE  This transmission is intended only for the use of the individual or entity to which it is addressed and may contain information that is privileged and confidential.  If the reader of this message is not the intended recipient, you are hereby notified that any disclosure, distribution, or copying of this information is strictly prohibited. If you have received this transmission in error, please notify us immediately by telephone, and return the original documents to us at the address listed above.

## (undated) NOTE — LETTER
[facility], [place]  Authorization for Invasive Procedures  1.  I hereby authorize Dr. Nestor Earl , my physician and whomever may be designated as the doctor's assistant, to perform the following operation and/or procedure:  Colonoscop and allergic reactions, hemolytic reactions, transmission of disease such as hepatitis, AIDS, cytomegalovirus (CMV), and flluid overload.  In the event that I wish to have autologous transfusions of my own blood, or a directed donor transfusion, I will disc Signature of Patient:  ________________________________________________ Date: _________Time: _________    Responsible person in case of minor or unconscious: _____________________________Relationship: ____________     Witness Signature: ___________________

## (undated) NOTE — LETTER
MEDICATION CONTRAINDICATION     Patient Name: Roopa Ruano CSN: [id number]    -Age: 3/4/1945-A: 68 y Sex:  female MRN: [id number]    Procedure: PHACOEMULSIFICATION OF LEFT CATARACT WITH PLACEMENT OF INTRAOCULAR LENS IMPLANT   Surgery Date:  2023  Anesthesia Type: MAC  Surgeon(s): Salbador Noe MD     Contraindication for phenylephrine (Mydfrin) 2.5% ophthalmic solution and tetracaine (Pontocaine) 0.5% ophthalmic solution due to patient's allergy to Novocain, reactions noted: anxiety, dizziness, palpitations    The above patient has a contraindication to the medication ordered from your standing orders/[facility] orders listed below. If you would like the medication given, please fax this form back indicating the override reason with physician signature/date/time.               ___  Benefit outweighs risk   ___  Insignificant               ___ Low risk                 ___ Not a true allergy              ___ Dose appropriate                ___  Tolerated regimen in the past     Physician Signature: ________________________ Date/Time: ______________  Naomi Madiha FORM BACK TO:  [phone]

## (undated) NOTE — LETTER
[address] [place] [address]      Authorization for Surgical Operation and Procedure     Date:___________                                                                                                         Time:__________  1. I hereby Sailaja Amaya MD, my physician and his/her assistants (if applicable), which may include medical students, residents, and/or fellows, to perform the following surgical operation/ procedure and administer such anesthesia as may be determined necessary by my physician:  Operation/Procedure name (s) ESOPHAGOGASTRODUODENOSCOPY (EGD) UPPER ENDOSCOPIC ULTRASOUND (EUS) with possible fine needle aspiration  on Sharlotte Stable   2. I recognize that during the surgical operation/procedure, unforeseen conditions may necessitate additional or different procedures than those listed above. I, therefore, further authorize and request that the above-named surgeon, assistants, or designees perform such procedures as are, in their judgment, necessary and desirable. 3.   My surgeon/physician has discussed prior to my surgery the potential benefits, risks and side effects of this procedure; the likelihood of achieving goals; and potential problems that might occur during recuperation. They also discussed reasonable alternatives to the procedure, including risks, benefits, and side effects related to the alternatives and risks related to not receiving this procedure. I have had all my questions answered and I acknowledge that no guarantee has been made as to the result that may be obtained. 4.   Should the need arise during my operation or immediate post-operative period, I also consent to the administration of blood and/or blood products.   Further, I understand that despite careful testing and screening of blood or blood products by collecting agencies, I may still be subject to ill effects as a result of receiving a blood transfusion and/or blood products. The following are some, but not all, of the potential risks that can occur: fever and allergic reactions, hemolytic reactions, transmission of diseases such as Hepatitis, AIDS and Cytomegalovirus (CMV) and fluid overload. In the event that I wish to have an autologous transfusion of my own blood, or a directed donor transfusion. I will discuss this with my physician. 5.   I authorize the use of any specimen, organs, tissues, body parts or foreign objects that may be removed from my body during the operation/procedure for diagnosis, research or teaching purposes and their subsequent disposal by hospital authorities. I also authorize the release of specimen test results and/or written reports to my treating physician on the hospital medical staff or other referring or consulting physicians involved in my care, at the discretion of the Pathologist or my treating physician. 6.   I consent to the photographing or videotaping of the operations or procedures to be performed, including appropriate portions of my body for medical, scientific, or educational purposes, provided my identity is not revealed by the pictures or by descriptive texts accompanying them. If the procedure has been photographed/videotaped, the surgeon will obtain the original picture, image, videotape or CD. The hospital will not be responsible for storage, release or maintenance of the picture, image, tape or CD.    7.   I consent to the presence of a  or observers in the operating room as deemed necessary by my physician or their designees. 8.   I recognize that in the event my procedure results in extended X-Ray/fluoroscopy time, I may develop a skin reaction. 9.  If I have a Do Not Attempt Resuscitation (DNAR) order in place, that status will be suspended while in the operating room, procedural suite, and during the recovery period unless otherwise explicitly stated by me (or a person authorized to consent on my behalf). The surgeon or my attending physician will determine when the applicable recovery period ends for purposes of reinstating the DNAR order. 10. Patients having a sterilization procedure: I understand that if the procedure is successful the results will be permanent and it will therefore be impossible for me to inseminate, conceive, or bear children. I also understand that the procedure is intended to result in sterility, although the result has not been guaranteed. 11. I acknowledge that my physician has explained sedation/analgesia administration to me including the risk and benefits I consent to the administration of sedation/analgesia as may be necessary or desirable in the judgment of my physician. I CERTIFY THAT I HAVE READ AND FULLY UNDERSTAND THE ABOVE CONSENT TO OPERATION and/or OTHER PROCEDURE.    _________________________________________  __________________________________  Signature of Patient     Signature of Responsible Person         ___________________________________         Printed Name of Responsible Person           _________________________________                  Relationship to Patient  _________________________________________  ______________________________  Signature of Witness          Date  Time    STATEMENT OF PHYSICIAN My signature below affirms that prior to the time of the procedure; I have explained to the patient and/or his/her legal representative, the risks and benefits involved in the proposed treatment and any reasonable alternative to the proposed treatment. I have also explained the risks and benefits involved in refusal of the proposed treatment and alternatives to the proposed treatment and have answered the patient's questions.  If I have a significant financial interest in a co-management agreement or a significant financial interest in any product or implant, or other significant relationship used in this procedure/surgery, I have disclosed this and had a discussion with my patient.     _______________________________________________________________ _____________________________  Addie Flores Physician)                                                                                         (Date)                                   (Time)        Patient Name: Ashley Lopez    : 3/4/1945   Printed: 2022      Medical Record #: [id number]                                              Page 1 of 1

## (undated) NOTE — LETTER
2/02/2021          [facility]   [place] [id number]        Dear Adenike Florentino,      You will be happy to know that your COVID 19 test returned NEGATIVE. If you need any further assistance, please feel free to call [phone].

## (undated) NOTE — LETTER
04/20/2021          [facility] [address]   [place] [id number]        Dear Jenae Leon will be happy to know that your COVID 19 test returned NEGATIVE. If you need any further assistance, please feel free to call [phone].     Ángel Clay

## (undated) NOTE — LETTER
[facility]  Administration of Anesthesia  1. Carla Ingram, or _________________________________ acting on her behalf, (Patient) (Dependent/Representative) request to receive anesthesia for my pending procedure/operation/treatment. A physician (anesthesiologist) alone or an anesthesiologist working with a nurse anesthetist may administer my anesthesia. 2. I understand that my anesthesiologist is not an employee or agent of the hospital, but is an independent medical practitioner who has been permitted to use its facilities for the care and treatment of his/her patients. 3. I acknowledge that a physician from [facility] or their designate(s), recommended anesthesia for me using her/his medical judgment. The type(s) of anesthesia I may receive include:                a) General Anesthesia, b) Spinal/Epidural Anesthesia, c) Regional Anesthesia or d) Monitored Anesthesia Care. 4. If my spinal, regional or monitored anesthesia care (local) is not satisfactory for my comfort, or if my medical condition requires, I consent to the administration of general anesthesia. 5. I am aware that the practice of anesthesiology is not an exact science and that some foreseeable risks or consequences may occur. Some common risks/consequences include sore throat and hoarseness, nausea and vomiting, muscle soreness, backache, damage to the mouth/teeth/vocal cords and eye injury. I understand that more rare but serious potential risks of anesthesia include blood pressure changes, drug reactions, cardiac arrest, brain damage, paralysis or death. These risks apply to whether I have general, spinal/epidural, regional or monitored anesthesia care. 6. OBSTETRIC PATIENTS: Specific risks/consequences of spinal/epidural anesthesia may include itching, low blood pressure, difficulty urinating, slowing of the baby's heart rate and headache.  Rare risks include infections, high spinal block, spinal bleeding, seizure, cardiac arrest and death. 7. AWARENESS: I understand that it is possible (but unlikely) to have explicit memory of events from the operating room while under general anesthesia. 8. ELECTROCONVULSIVE THERAPY PATIENTS: This consent serves for all treatments in a single course of therapy. 9. I understand that I must inform my anesthesiologist when I last ate and/or drank to minimize the risk of anesthesia. 10. If I am pregnant, or may pregnant, I understand that elective surgery should be postponed until after the baby is born. Anesthetics cross the placenta and may temporarily anesthetize the baby. Although fetal complications of anesthesia during pregnancy are rare, they may include birth defects, premature labor, brain damage and death. 11. I certify that I informed the anesthesiologist, to the best of my ability, about medication I take including blood thinners, anticoagulants, herbal remedies, narcotics and recreational drugs (e.g. cocaine, marijuana, PCP). Failure to inform my anesthesiologist about these medicines may increase my risk of anesthetic complications. The nature and purpose of my anesthetic management was explained to me. I had the opportunity to ask questions and the answers and information provided meet my satisfaction.   I retain the right to withdraw this consent at any time prior to the administration of said anesthetic.    ___________________________________________________           _____________________________________________________  Patient Signature                                                                                      Witness Signature                ___________________________________________________           _____________________________________________________  Date/Time                                                                                               Responsible person in case of minor/ unconscious pt /Relationship    My signature below affirms that prior to the time of the procedure, I have explained to the patient and/or his/her guardian, the risks and benefits of undergoing anesthesia, as well as any reasonable alternatives.     ___________________________________________________            _____________________________________________________  Physician Signature                            Date/Time  Patient Name: Aminta Fowlre     : 3/4/1945     Printed: 2022      Medical Record #: [id number]                              Page 1 of 1    ----------ANESTHESIA CONSENT----------